# Patient Record
Sex: MALE | Employment: OTHER | URBAN - METROPOLITAN AREA
[De-identification: names, ages, dates, MRNs, and addresses within clinical notes are randomized per-mention and may not be internally consistent; named-entity substitution may affect disease eponyms.]

---

## 2017-06-20 ENCOUNTER — TELEPHONE (OUTPATIENT)
Dept: NEUROLOGY | Facility: CLINIC | Age: 62
End: 2017-06-20

## 2017-06-20 NOTE — TELEPHONE ENCOUNTER
DMV form received, via fax on 6/20/17. Form placed in folder to be completed by GUERITA Clayton CMA

## 2017-06-22 NOTE — TELEPHONE ENCOUNTER
DMV form signed, faxed to [employer] on 6/22/17, sent to scanning, and copy mailed to patient. Original mailed to DMV.

## 2017-10-05 ENCOUNTER — OFFICE VISIT (OUTPATIENT)
Dept: INTERNAL MEDICINE | Facility: CLINIC | Age: 62
End: 2017-10-05
Payer: COMMERCIAL

## 2017-10-05 VITALS
DIASTOLIC BLOOD PRESSURE: 78 MMHG | OXYGEN SATURATION: 98 % | HEART RATE: 92 BPM | TEMPERATURE: 97.9 F | HEIGHT: 71 IN | WEIGHT: 195 LBS | BODY MASS INDEX: 27.3 KG/M2 | SYSTOLIC BLOOD PRESSURE: 110 MMHG

## 2017-10-05 DIAGNOSIS — Z23 NEED FOR PROPHYLACTIC VACCINATION AND INOCULATION AGAINST INFLUENZA: ICD-10-CM

## 2017-10-05 DIAGNOSIS — M79.645 PAIN OF FINGER OF LEFT HAND: Primary | ICD-10-CM

## 2017-10-05 PROCEDURE — 99203 OFFICE O/P NEW LOW 30 MIN: CPT | Mod: 25 | Performed by: INTERNAL MEDICINE

## 2017-10-05 PROCEDURE — 90471 IMMUNIZATION ADMIN: CPT | Performed by: INTERNAL MEDICINE

## 2017-10-05 PROCEDURE — 90686 IIV4 VACC NO PRSV 0.5 ML IM: CPT | Performed by: INTERNAL MEDICINE

## 2017-10-05 NOTE — PATIENT INSTRUCTIONS
Ice to the finger area  5-10 min twice a day for 1 week  If not resolved, then make appt with [facility] in [place] [phone]   Dr Downing, Dr Reid,  Dr Valdez or Dr Pope (hand specialists)  Hold Aspirin and fish oil between now and possible future ortho appt. If getting better, then may restart  See me as scheduled for full physical and discussion healthcare maintenance issues. Come to the appt fasting so necessary labs can be drawn

## 2017-10-05 NOTE — NURSING NOTE
"Chief Complaint   Patient presents with     Swelling     in left middle finger, x 5 weeks. Is now starting in right thumb. Sore to the touch       Initial /78  Pulse 92  Temp 97.9  F (36.6  C) (Oral)  Ht 5' 11\" (1.803 m)  Wt 195 lb (88.5 kg)  SpO2 98%  BMI 27.2 kg/m2 Estimated body mass index is 27.2 kg/(m^2) as calculated from the following:    Height as of this encounter: 5' 11\" (1.803 m).    Weight as of this encounter: 195 lb (88.5 kg).  Medication Reconciliation: complete  "

## 2017-10-05 NOTE — PROGRESS NOTES
Injectable Influenza Immunization Documentation    1.  Is the person to be vaccinated sick today?   No    2. Does the person to be vaccinated have an allergy to a component   of the vaccine?   No    3. Has the person to be vaccinated ever had a serious reaction   to influenza vaccine in the past?   No    4. Has the person to be vaccinated ever had Guillain-Barré syndrome?   No    Form completed by CECI Chamorro

## 2017-10-05 NOTE — PROGRESS NOTES
"  SUBJECTIVE:   Leighton Morales is a 61 year old male who presents to clinic today for the following health issues:      Chief Complaint   Patient presents with     Swelling     in left middle finger, x 5 weeks.  Sore to the touch     Flu Shot       Pt's past medical history, family history, habits, medications and allergies were reviewed with the patient today. Most recent lab results reviewed with pt. Problem list and histories reviewed & adjusted, as indicated.  Additional history as below:    Pt establishing care with me also today. HAs been followed by [facility] clinic provider for the past 10 years. Notes in Care Everywhere reviewed. Has future PE appt set up. Neurology notes reviewed. Has not had recent seizure activity. Pt complains of swelling between the PIP and DIP joints of left 3rd finger as above. Left handed. Doesn't remember any trauma to finger. Works in a bank. Area of finger swollen some and skin started to slough superficially \"like a blister popping\". Dennies skin sx elsewhere on body     Additional ROS:   Constitutional, HEENT, Cardiovascular, Pulmonary, GI and , Neuro, MSK and Psych review of systems/symptoms are otherwise negative or unchanged from previous, except as noted above.      OBJECTIVE:  /78  Pulse 92  Temp 97.9  F (36.6  C) (Oral)  Ht 5' 11\" (1.803 m)  Wt 195 lb (88.5 kg)  SpO2 98%  BMI 27.2 kg/m2   Estimated body mass index is 27.2 kg/(m^2) as calculated from the following:    Height as of this encounter: 5' 11\" (1.803 m).    Weight as of this encounter: 195 lb (88.5 kg).    Pulm: Lungs clear to auscultation   CV: Regular rates and rhythm  GI: Soft, nontender, Normal active bowel sounds   Ext: Peripheral pulses intact. No edema.  Neuro: Normal strength and tone, sensory exam grossly normal  Skin: Mild fluctuence left 3rd finger between PIP and DIP joint on palmar side. Slight underlying discoloration like a bruise/blood blister. Mild tenderness to palpation this area. Able " to bend DIP and PIP joints that finger without tenderness and ROM minimally reduced to flexion  At the PIP joint due to the swelling    Followed verbal consent, area of concern was cleaned with rubbing alcohol and sterile 21g needle was passed into the flutuence. Small amount of blood drained without any sign of pus and fluctuence was much less and nearly absent after that    Assessment/Plan: (See plan discussion below for further details)  1. Pain of finger of left hand  Probable blood blister. See plan below    2. Need for prophylactic vaccination and inoculation against influenza  - FLU VAC, SPLIT VIRUS IM > 3 YO (QUADRIVALENT) [00755]  - Vaccine Administration, Initial [15446]    Plan discussion:  Ice to the finger area  5-10 min twice a day for 1 week  If not resolved, then make appt with [facility] in [place] [phone]   Dr Downing, Dr Reid,  Dr Valdez or Dr Pope (hand specialists)  Hold Aspirin and fish oil between now and possible future ortho appt. If getting better, then may restart  See me as scheduled for full physical and discussion healthcare maintenance issues. Come to the appt fasting so necessary labs can be drawn       Los Mcelroy MD  Internal Medicine Department  [facility]

## 2017-10-05 NOTE — MR AVS SNAPSHOT
After Visit Summary   10/5/2017    Leighton Morales    MRN: [id number]           Patient Information     Date Of Birth          1955        Visit Information        Provider Department      10/5/2017 4:00 PM Los Mcelroy MD [facility]        Today's Diagnoses     Pain of finger of left hand    -  1    Need for Tdap vaccination          Care Instructions    Ice to the finger area  5-10 min twice a day for 1 week  If not resolved, then make appt with [facility] in [place] [phone]   Dr Downing, Dr Up,  Dr Valdez or Dr Pope (hand specialists)  Hold Aspirin and fish oil between now and possible future ortho appt. If getting better, then may restart          Follow-ups after your visit        Your next 10 appointments already scheduled     Oct 23, 2017  8:00 AM CDT   [facility]    [address] [address] [phone]              Who to contact     If you have questions or need follow up information about today's clinic visit or your schedule please contact [facility] directly at [phone].  Normal or non-critical lab and imaging results will be communicated to you by MyChart, letter or phone within 4 business days after the clinic has received the results. If you do not hear from us within 7 days, please contact the clinic through MyChart or phone. If you have a critical or abnormal lab result, we will notify you by phone as soon as possible.  Submit refill requests through i.Meter or call your pharmacy and they will forward the refill request to us. Please allow 3 business days for your refill to be completed.          Additional Information About Your Visit        i.Meter Information     i.Meter lets you send messages to your doctor, view your test results, renew your prescriptions, schedule appointments and more. To  "sign up, go to [email or web address] . Click on \"Log in\" on the left side of the screen, which will take you to the Welcome page. Then click on \"Sign up Now\" on the right side of the page.     You will be asked to enter the access code listed below, as well as some personal information. Please follow the directions to create your username and password.     Your access code is: [email or web address]  Expires: 1/3/2018  4:47 PM     Your access code will  in 90 days. If you need help or a new code, please call your [place] [facility] clinic or [phone].        Care EveryWhere ID     This is your Care EveryWhere ID. This could be used by other organizations to access your [facility] medical records  [id number]        Your Vitals Were     Pulse Temperature Height Pulse Oximetry BMI (Body Mass Index)       92 97.9  F (36.6  C) (Oral) 5' 11\" (1.803 m) 98% 27.2 kg/m2        Blood Pressure from Last 3 Encounters:   10/05/17 110/78   16 113/66   09/25/15 103/76    Weight from Last 3 Encounters:   10/05/17 195 lb (88.5 kg)   16 187 lb 12.8 oz (85.2 kg)   09/25/15 183 lb 3.2 oz (83.1 kg)              Today, you had the following     No orders found for display       Primary Care Provider Office Phone # Fax #    Sarthak Fuentes [phone]       [address] [place] [id number]        Equal Access to Services     [facility] : Hadii ramon george hadasho Sodrew, waaxda luqadaha, qaybta kaalmada kristan, nika almeida. So [facility] [phone].    ATENCIÓN: Si habla español, tiene a rojas disposición servicios gratuitos de asistencia lingüística. Elli al [phone].    We comply with applicable federal civil rights laws and [place] laws. We do not discriminate on the basis of race, color, national origin, age, disability, sex, sexual orientation, or gender identity.            Thank you!     Thank you for choosing [facility]  for your care. Our goal is " always to provide you with excellent care. Hearing back from our patients is one way we can continue to improve our services. Please take a few minutes to complete the written survey that you may receive in the mail after your visit with us. Thank you!             Your Updated Medication List - Protect others around you: Learn how to safely use, store and throw away your medicines at www.disposemymeds.org.          This list is accurate as of: 10/5/17  4:47 PM.  Always use your most recent med list.                   Brand Name Dispense Instructions for use Diagnosis    aspirin 81 MG tablet      Take 81 mg by mouth daily        carBAMazepine 400 MG 12 hr tablet    TEGretol XR    360 tablet    TAKE 1 TABLETS EVERY MORNING, 2 TABS EVERY AFTERNOON, AND 1 TABS EVERY EVENING    Partial epilepsy with intractable epilepsy (H), Seizure syndrome (H)       levETIRAcetam 1000 MG Tabs     405 tablet    TAKE 1.5 TABLETS BY MOUTH THREE TIMES DAILY    Seizure disorder (H)       MULTIVITAMINS PO      Take by mouth daily        OMEGA-3 FISH OIL PO      Take by mouth daily        pravastatin 10 MG tablet    PRAVACHOL     Take 20 mg by mouth daily At bedtime

## 2017-10-09 ENCOUNTER — TELEPHONE (OUTPATIENT)
Dept: NEUROLOGY | Facility: CLINIC | Age: 62
End: 2017-10-09

## 2017-10-09 DIAGNOSIS — G40.909 SEIZURE SYNDROME (H): ICD-10-CM

## 2017-10-09 DIAGNOSIS — G40.119 PARTIAL EPILEPSY WITH INTRACTABLE EPILEPSY (H): Primary | ICD-10-CM

## 2017-10-09 RX ORDER — CARBAMAZEPINE 400 MG/1
TABLET, EXTENDED RELEASE ORAL
Qty: 360 TABLET | Refills: 0 | Status: SHIPPED | OUTPATIENT
Start: 2017-10-09 | End: 2017-11-16

## 2017-10-09 NOTE — TELEPHONE ENCOUNTER
One week supply of Carbamazepine XR 12 hour tabs ([phone]) verbally called into [facility], [place] (# [id number])

## 2017-10-23 ENCOUNTER — TRANSFERRED RECORDS (OUTPATIENT)
Dept: HEALTH INFORMATION MANAGEMENT | Facility: CLINIC | Age: 62
End: 2017-10-23

## 2017-10-23 ENCOUNTER — OFFICE VISIT (OUTPATIENT)
Dept: INTERNAL MEDICINE | Facility: CLINIC | Age: 62
End: 2017-10-23
Payer: COMMERCIAL

## 2017-10-23 VITALS
TEMPERATURE: 97.7 F | SYSTOLIC BLOOD PRESSURE: 116 MMHG | OXYGEN SATURATION: 99 % | WEIGHT: 186 LBS | DIASTOLIC BLOOD PRESSURE: 70 MMHG | BODY MASS INDEX: 25.94 KG/M2 | HEART RATE: 76 BPM

## 2017-10-23 DIAGNOSIS — Z00.01 ENCOUNTER FOR ROUTINE ADULT MEDICAL EXAM WITH ABNORMAL FINDINGS: Primary | ICD-10-CM

## 2017-10-23 DIAGNOSIS — F10.10 EXCESSIVE DRINKING ALCOHOL: ICD-10-CM

## 2017-10-23 DIAGNOSIS — E78.2 MIXED HYPERLIPIDEMIA: ICD-10-CM

## 2017-10-23 DIAGNOSIS — R56.9 CONVULSIONS, UNSPECIFIED CONVULSION TYPE (H): ICD-10-CM

## 2017-10-23 DIAGNOSIS — N40.1 BENIGN PROSTATIC HYPERPLASIA WITH LOWER URINARY TRACT SYMPTOMS, SYMPTOM DETAILS UNSPECIFIED: ICD-10-CM

## 2017-10-23 DIAGNOSIS — Z12.5 SCREENING FOR PROSTATE CANCER: ICD-10-CM

## 2017-10-23 LAB
ALBUMIN SERPL-MCNC: 4 G/DL (ref 3.4–5)
ALBUMIN SERPL-MCNC: 4 G/DL (ref 3.4–5)
ALP SERPL-CCNC: 50 U/L (ref 40–150)
ALP SERPL-CCNC: 50 U/L (ref 40–150)
ALT SERPL W P-5'-P-CCNC: 31 U/L (ref 0–70)
ALT SERPL-CCNC: 31 U/L (ref 0–70)
ANION GAP SERPL CALCULATED.3IONS-SCNC: 7 MMOL/L (ref 3–14)
ANION GAP SERPL CALCULATED.3IONS-SCNC: 7 MMOL/L (ref 3–14)
AST SERPL W P-5'-P-CCNC: 15 U/L (ref 0–45)
AST SERPL-CCNC: 15 U/L (ref 0–45)
BILIRUB SERPL-MCNC: 0.5 MG/DL (ref 0.2–1.3)
BILIRUB SERPL-MCNC: 0.5 MG/DL (ref 0.2–1.3)
BUN SERPL-MCNC: 13 MG/DL (ref 7–30)
BUN SERPL-MCNC: 13 MG/DL (ref 7–30)
CALCIUM SERPL-MCNC: 8.8 MG/DL (ref 8.5–10.1)
CALCIUM SERPL-MCNC: 8.8 MG/DL (ref 8.5–10.1)
CARBAMAZEPINE SERPL-MCNC: 12.7 MG/L (ref 4–12)
CARBAMAZEPINE SERPL-MCNC: 12.7 UG/ML (ref 4–12)
CHLORIDE SERPL-SCNC: 104 MMOL/L (ref 94–109)
CHLORIDE SERPLBLD-SCNC: 104 MMOL/L (ref 94–109)
CHOLEST SERPL-MCNC: 224 MG/DL
CHOLEST SERPL-MCNC: 224 MG/DL
CO2 SERPL-SCNC: 29 MMOL/L (ref 20–32)
CO2 SERPL-SCNC: 29 MMOL/L (ref 20–32)
CREAT SERPL-MCNC: 0.98 MG/DL (ref 0.66–1.25)
CREAT SERPL-MCNC: 0.98 MG/DL (ref 0.66–1.25)
ERYTHROCYTE [DISTWIDTH] IN BLOOD BY AUTOMATED COUNT: 12.3 % (ref 10–15)
ERYTHROCYTE [DISTWIDTH] IN BLOOD BY AUTOMATED COUNT: 12.3 % (ref 10–15)
GFR SERPL CREATININE-BSD FRML MDRD: 78 ML/MIN/1.7M2
GLUCOSE SERPL-MCNC: 93 MG/DL (ref 70–99)
GLUCOSE SERPL-MCNC: 93 MG/DL (ref 70–99)
HCT VFR BLD AUTO: 43 % (ref 40–53)
HCT VFR BLD AUTO: 43 % (ref 40–53)
HDLC SERPL-MCNC: 79 MG/DL
HDLC SERPL-MCNC: 79 MG/DL
HEMOGLOBIN: 14.6 G/DL (ref 13.3–17.7)
HGB BLD-MCNC: 14.6 G/DL (ref 13.3–17.7)
KEPPRA (LEVETIRACETAM) LEVEL: 39 UG/ML (ref 12–46)
LDL CHOLESTEROL: 131 MG/DL
LDLC SERPL CALC-MCNC: 131 MG/DL
MCH RBC QN AUTO: 31.9 PG (ref 26.5–33)
MCH RBC QN AUTO: 31.9 PG (ref 26.5–33)
MCHC RBC AUTO-ENTMCNC: 34 G/DL (ref 31.5–36.5)
MCHC RBC AUTO-ENTMCNC: 34 G/DL (ref 31.5–36.5)
MCV RBC AUTO: 94 FL (ref 78–100)
MCV RBC AUTO: 94 FL (ref 78–100)
NONHDLC SERPL-MCNC: 145 MG/DL
NONHDLC SERPL-MCNC: 145 MG/DL
PLATELET # BLD AUTO: 152 10E9/L (ref 150–450)
PLATELET # BLD AUTO: 152 10E9/L (ref 150–450)
POTASSIUM SERPL-SCNC: 3.9 MMOL/L (ref 3.4–5.3)
POTASSIUM SERPL-SCNC: 3.9 MMOL/L (ref 3.4–5.3)
PROT SERPL-MCNC: 7.5 G/DL (ref 6.8–8.8)
PROT SERPL-MCNC: 7.5 G/DL (ref 6.8–8.8)
PSA SERPL-ACNC: 2.71 UG/L (ref 0–4)
PSA SERPL-MCNC: 2.71 UG/L (ref 0–4)
RBC # BLD AUTO: 4.58 10E12/L (ref 4.4–5.9)
RBC # BLD AUTO: 4.58 10E12/L (ref 4.4–5.9)
SODIUM SERPL-SCNC: 140 MMOL/L (ref 133–144)
SODIUM SERPL-SCNC: 140 MMOL/L (ref 133–144)
TRIGL SERPL-MCNC: 68 MG/DL
TRIGL SERPL-MCNC: 68 MG/DL
WBC # BLD AUTO: 4.4 10E9/L (ref 4–11)
WBC # BLD AUTO: 4.4 10E9/L (ref 4–11)

## 2017-10-23 PROCEDURE — 80156 ASSAY CARBAMAZEPINE TOTAL: CPT | Performed by: INTERNAL MEDICINE

## 2017-10-23 PROCEDURE — 99396 PREV VISIT EST AGE 40-64: CPT | Performed by: INTERNAL MEDICINE

## 2017-10-23 PROCEDURE — 80177 DRUG SCRN QUAN LEVETIRACETAM: CPT | Mod: 90 | Performed by: INTERNAL MEDICINE

## 2017-10-23 PROCEDURE — G0103 PSA SCREENING: HCPCS | Performed by: INTERNAL MEDICINE

## 2017-10-23 PROCEDURE — 80053 COMPREHEN METABOLIC PANEL: CPT | Performed by: INTERNAL MEDICINE

## 2017-10-23 PROCEDURE — 99000 SPECIMEN HANDLING OFFICE-LAB: CPT | Performed by: INTERNAL MEDICINE

## 2017-10-23 PROCEDURE — 80061 LIPID PANEL: CPT | Performed by: INTERNAL MEDICINE

## 2017-10-23 PROCEDURE — 36415 COLL VENOUS BLD VENIPUNCTURE: CPT | Performed by: INTERNAL MEDICINE

## 2017-10-23 PROCEDURE — 85027 COMPLETE CBC AUTOMATED: CPT | Performed by: INTERNAL MEDICINE

## 2017-10-23 RX ORDER — TAMSULOSIN HYDROCHLORIDE 0.4 MG/1
0.4 CAPSULE ORAL DAILY
Qty: 90 CAPSULE | Refills: 3 | Status: SHIPPED | OUTPATIENT
Start: 2017-10-23 | End: 2018-12-20

## 2017-10-23 RX ORDER — TAMSULOSIN HYDROCHLORIDE 0.4 MG/1
0.4 CAPSULE ORAL DAILY
COMMUNITY
End: 2017-10-23

## 2017-10-23 RX ORDER — PRAVASTATIN SODIUM 20 MG
20 TABLET ORAL DAILY
Qty: 90 TABLET | Refills: 3 | COMMUNITY
Start: 2017-10-23 | End: 2018-02-07 | Stop reason: DRUGHIGH

## 2017-10-23 NOTE — NURSING NOTE
"Chief Complaint   Patient presents with     Physical       Initial /70  Pulse 76  Temp 97.7  F (36.5  C) (Oral)  Wt 186 lb (84.4 kg)  SpO2 99%  BMI 25.94 kg/m2 Estimated body mass index is 25.94 kg/(m^2) as calculated from the following:    Height as of 10/5/17: 5' 11\" (1.803 m).    Weight as of this encounter: 186 lb (84.4 kg).  Medication Reconciliation: complete  "

## 2017-10-23 NOTE — PROGRESS NOTES
SUBJECTIVE:   CC: Leighton Morales is an 61 year old male who presents for preventative health visit.     Healthy Habits:  Answers for HPI/ROS submitted by the patient on 10/23/2017   Annual Exam:  Getting at least 3 servings of Calcium per day:: Yes  Bi-annual eye exam:: Yes  Dental care twice a year:: Yes  Sleep apnea or symptoms of sleep apnea:: Sleep apnea  Frequency of exercise:: 2-3 days/week  Taking medications regularly:: Yes  Medication side effects:: None  PHQ-2 Score: 0  Duration of exercise:: 15-30 minutes    Colonoscopy 9/15/08 at outside facility        Today's PHQ-2 Score: PHQ-2 ( 1999 Pf[employer]) 10/23/2017 10/5/2017   Q1: Little interest or pleasure in doing things 0 0   Q2: Feeling down, depressed or hopeless 0 0   PHQ-2 Score 0 0   Q1: Little interest or pleasure in doing things Not at all -   Q2: Feeling down, depressed or hopeless Not at all -   PHQ-2 Score 0 -         Abuse: Current or Past(Physical, Sexual or Emotional)- No  Do you feel safe in your environment - Yes  Social History   Substance Use Topics     Smoking status: Former Smoker     Packs/day: 0.50     Quit date: 8/30/1978     Smokeless tobacco: Never Used     Alcohol use Yes      Comment: occ         Drinking 6 oz whiskey/day at night. Denies depression/anxiety and says is just a habit from what has done for years. No legal difficulties. No driving when drinking. HAs stopped for 1 week before without any withdrawal or sz.   No eye opener. Never missed work due to alcohol. Denies hangovers.     Last PSA: No results found for: PSA    Reviewed orders with patient. Reviewed health maintenance and updated orders accordingly - Yes  Labs reviewed in EPIC    Reviewed and updated as needed this visit by clinical staff  Allergies         Reviewed and updated as needed this visit by Provider        ROS:  C: NEGATIVE for fever, chills. Weight back to baseline from  1 yearago  I: NEGATIVE for worrisome rashes, moles. Left 3rd finger swelling skin  less than previous visit  E: NEGATIVE for vision changes or irritation. Has glasses. Eye exam 2 years ago  ENT: NEGATIVE for ear, mouth and throat problems  R: NEGATIVE for significant cough or SOB  CV: NEGATIVE for chest pain, palpitations or peripheral edema  GI: NEGATIVE for nausea, abdominal pain, heartburn, or change in bowel habits   male: negative for dysuria, hematuria, decreased urinary stream,  urethral discharge. Rare ED sx  M: NEGATIVE for significant arthralgias or myalgia that limit activity. Occ stiffness in low back with sitting all day at work. Not doing  Exercises previously given   N: NEGATIVE for weakness, dizziness or paresthesias. On sz meds. Last levels about 1 year ago and requesting to have done. Last sz approx 2007    P: NEGATIVE for changes in mood or affect. See above re: alcohol use    OBJECTIVE:   /70  Pulse 76  Temp 97.7  F (36.5  C) (Oral)  Wt 186 lb (84.4 kg)  SpO2 99%  BMI 25.94 kg/m2  EXAM:  General appearance - healthy, alert, no distress  Skin - No rashes. Minimal fluctuence  palmar side  Left 3rd finger between DIP and PIP joint (less than previous visit) without tenderness  Head - normocephalic, atraumatic  Eyes - LIZ, EOMI, fundi exam with nondilated pupils negative.  Ears - External ears normal. Canals clear. TM's normal.  Nose/Sinuses - Nares normal. Septum midline. Mucosa normal. No drainage or sinus tenderness.  Oropharynx - No erythema, no adenopathy, no exudates.  Neck - Supple without adenopathy or thyromegaly. No bruits.  Lungs - Clear to auscultation without wheezes/rhonchi.  Heart - Regular rate and rhythm without murmurs, clicks, or gallops.  Nodes - No supraclavicular, axillary, or inguinal adenopathy palpable.  Abdomen - Abdomen soft, non-tender. BS normal. No masses or hepatosplenomegaly palpable. No bruits.  Extremities -No cyanosis, clubbing or edema.    Musculoskeletal - Spine ROM normal. Muscular strength intact.   Peripheral pulses -  radial=4/4, femoral=4/4, posterior tibial=4/4, dorsalis pedis=4/4,  Neuro - Gait normal. Reflexes normal and symmetric. Sensation grossly WNL.  Genital - Normal-appearing male external genitalia. No scrotal masses or inguinal hernia palpable.   Rectal - Guaic negative stool. Normal tone. Prostate 2 plus in size to palpation. No rectal masses or prostate nodularity palpable      ASSESSMENT/PLAN:   1. Encounter for routine adult medical exam with abnormal findings  Screening labs as ordered. Other HCM UTD  - CBC with platelets  - Comprehensive metabolic panel    2. Benign prostatic hyperplasia with lower urinary tract symptoms, symptom details unspecified  Sx controlled with meds  - tamsulosin (FLOMAX) 0.4 MG capsule; Take 1 capsule (0.4 mg) by mouth daily  Dispense: 90 capsule; Refill: 3    3. Mixed hyperlipidemia  On low dose statin currently. Labs as ordered to see if increased dosage needed  - pravastatin (PRAVACHOL) 20 MG tablet; Take 1 tablet (20 mg) by mouth daily  Dispense: 90 tablet; Refill: 3  - Comprehensive metabolic panel  - Lipid panel reflex to direct LDL    4. Convulsions, unspecified convulsion type (H)  Followed by neurology. NO sz for many years. Labs as ordered. Continue current meds pending results  - Carbamazepine total  - Keppra (Levetiracetam) Level  - CBC with platelets  - Comprehensive metabolic panel    5. Excessive drinking alcohol  Denies legal issues in past and never drinking/driving. See plan discussion as below. Labs as ordered  - Comprehensive metabolic pane    6. Screening for prostate cancer   PSA ordered for intermittent monitoring after discussion with pt and pt preference. Not doing annual PSA per USPTF recs  - Prostate spec antigen screen      COUNSELING:  Reviewed preventive health counseling, as reflected in patient instructions  Special attention given to:        Regular exercise       Healthy diet/nutrition       Alcohol Use             reports that he quit smoking about 39  "years ago. He smoked 0.50 packs per day. He has never used smokeless tobacco.      Estimated body mass index is 27.2 kg/(m^2) as calculated from the following:    Height as of 10/5/17: 5' 11\" (1.803 m).    Weight as of 10/5/17: 195 lb (88.5 kg).   Weight management plan: Discussed healthy diet and exercise guidelines and patient will follow up in 12 months in clinic to re-evaluate.    Counseling Resources:  ATP IV Guidelines  Pooled Cohorts Equation Calculator  FRAX Risk Assessment  ICSI Preventive Guidelines  Dietary Guidelines for Americans, 2010  USDA's MyPlate  ASA Prophylaxis  Lung CA Screening      PLAN:   Labs as ordered   Pt to try stopping all alcohol intake for the next 2 weeks. If not able to do, then contact clinic and will refer for chem dep counselling. If able to stop for that period of time, then may remain off alcohol if choose or use intermittently with max 1 drink/night  Continue current meds  Continue CPAP    Los Mcelroy MD  [facility]  "

## 2017-10-23 NOTE — MR AVS SNAPSHOT
After Visit Summary   10/23/2017    Leighton Morales    MRN: [id number]           Patient Information     Date Of Birth          1955        Visit Information        Provider Department      10/23/2017 8:00 AM Los Mcelroy MD [facility]        Today's Diagnoses     Encounter for routine adult medical exam with abnormal findings    -  1    Benign prostatic hyperplasia with lower urinary tract symptoms, symptom details unspecified        Mixed hyperlipidemia        Convulsions, unspecified convulsion type (H)        Excessive drinking alcohol        Screening for prostate cancer          Care Instructions      Preventive Health Recommendations  Male Ages 50 - 64    Yearly exam:             See your health care provider every year in order to  o   Review health changes.   o   Discuss preventive care.    o   Review your medicines if your doctor has prescribed any.     Have a cholesterol test every 5 years, or more frequently if you are at risk for high cholesterol/heart disease.     Have a diabetes test (fasting glucose) every three years. If you are at risk for diabetes, you should have this test more often.     Have a colonoscopy at age 50, or have a yearly FIT test (stool test). These exams will check for colon cancer.      Talk with your health care provider about whether or not a prostate cancer screening test (PSA) is right for you.    You should be tested each year for STDs (sexually transmitted diseases), if you re at risk.     Shots: Get a flu shot each year. Get a tetanus shot every 10 years.     Nutrition:    Eat at least 5 servings of fruits and vegetables daily.     Eat whole-grain bread, whole-wheat pasta and brown rice instead of white grains and rice.     Talk to your provider about Calcium and Vitamin D.     Lifestyle    Exercise for at least 150 minutes a week (30 minutes a day, 5 days a week). This will help you control your weight and prevent disease.  "    Limit alcohol to one drink per day.     No smoking.     Wear sunscreen to prevent skin cancer.     See your dentist every six months for an exam and cleaning.     See your eye doctor every 1 to 2 years.            Follow-ups after your visit        Your next 10 appointments already scheduled     2017  3:00 PM CDT   Return Visit with Carmen Patton MD   [facility] ([facility])    [address], [address]  [facility] [address] [phone]              Who to contact     If you have questions or need follow up information about today's clinic visit or your schedule please contact [facility] directly at [phone].  Normal or non-critical lab and imaging results will be communicated to you by [employer], letter or phone within 4 business days after the clinic has received the results. If you do not hear from us within 7 days, please contact the clinic through [employer] or phone. If you have a critical or abnormal lab result, we will notify you by phone as soon as possible.  Submit refill requests through [employer] or call your pharmacy and they will forward the refill request to us. Please allow 3 business days for your refill to be completed.          Additional Information About Your Visit        GreatCall Information     GreatCall lets you send messages to your doctor, view your test results, renew your prescriptions, schedule appointments and more. To sign up, go to [email or web address] . Click on \"Log in\" on the left side of the screen, which will take you to the Welcome page. Then click on \"Sign up Now\" on the right side of the page.     You will be asked to enter the access code listed below, as well as some personal information. Please follow the directions to create your username and password.     Your access code is: [email or web address]  Expires: 1/3/2018  4:47 PM     Your access code will  in 90 days. If you need help or a new code, please " call your [facility] clinic or [phone].        Care EveryWhere ID     This is your Care EveryWhere ID. This could be used by other organizations to access your [facility] medical records  [id number]        Your Vitals Were     Pulse Temperature Pulse Oximetry BMI (Body Mass Index)          76 97.7  F (36.5  C) (Oral) 99% 25.94 kg/m2         Blood Pressure from Last 3 Encounters:   10/23/17 116/70   10/05/17 110/78   09/20/16 113/66    Weight from Last 3 Encounters:   10/23/17 186 lb (84.4 kg)   10/05/17 195 lb (88.5 kg)   09/20/16 187 lb 12.8 oz (85.2 kg)              We Performed the Following     Carbamazepine total     CBC with platelets     Comprehensive metabolic panel     Keppra (Levetiracetam) Level     Lipid panel reflex to direct LDL     Prostate spec antigen screen          Today's Medication Changes          These changes are accurate as of: 10/23/17  9:42 PM.  If you have any questions, ask your nurse or doctor.               These medicines have changed or have updated prescriptions.        Dose/Directions    PRAVACHOL 20 MG tablet   This may have changed:  Another medication with the same name was removed. Continue taking this medication, and follow the directions you see here.   Used for:  Mixed hyperlipidemia   Generic drug:  pravastatin   Changed by:  Los Mcelroy MD        Dose:  20 mg   Take 1 tablet (20 mg) by mouth daily   Quantity:  90 tablet   Refills:  3            Where to get your medicines      These medications were sent to [facility] [address] [address]     Phone:  [phone]     tamsulosin 0.4 MG capsule                Primary Care Provider Office Phone # Fax #    Sarthak Fuentes [phone]       [address] [place] [phone]        Equal Access to Services     JORGE YU AH: Marium Méndez, beverly longoria, qarachele rushing, nika morris  terrie whytestewartscott mccartneyStacyaakimberlyn ah. So [facility] [phone].    ATENCIÓN: Si antonio hull, tiene a rojas disposición servicios gratuitos de asistencia lingüística. Elli dawkins [phone].    We comply with applicable federal civil rights laws and [place] laws. We do not discriminate on the basis of race, color, national origin, age, disability, sex, sexual orientation, or gender identity.            Thank you!     Thank you for choosing [facility]  for your care. Our goal is always to provide you with excellent care. Hearing back from our patients is one way we can continue to improve our services. Please take a few minutes to complete the written survey that you may receive in the mail after your visit with us. Thank you!             Your Updated Medication List - Protect others around you: Learn how to safely use, store and throw away your medicines at www.disposemymeds.org.          This list is accurate as of: 10/23/17  9:42 PM.  Always use your most recent med list.                   Brand Name Dispense Instructions for use Diagnosis    aspirin 81 MG tablet      Take 81 mg by mouth daily        carBAMazepine 400 MG 12 hr tablet    TEGretol XR    360 tablet    TAKE 1 TABLETS EVERY MORNING, 2 TABS EVERY AFTERNOON, AND 1 TABS EVERY EVENING    Partial epilepsy with intractable epilepsy (H), Seizure syndrome (H)       levETIRAcetam 1000 MG Tabs     405 tablet    TAKE 1.5 TABLETS BY MOUTH THREE TIMES DAILY    Seizure disorder (H)       MULTIVITAMINS PO      Take by mouth daily        OMEGA-3 FISH OIL PO      Take by mouth daily        PRAVACHOL 20 MG tablet   Generic drug:  pravastatin     90 tablet    Take 1 tablet (20 mg) by mouth daily    Mixed hyperlipidemia       tamsulosin 0.4 MG capsule    FLOMAX    90 capsule    Take 1 capsule (0.4 mg) by mouth daily    Benign prostatic hyperplasia with lower urinary tract symptoms, symptom details unspecified

## 2017-10-24 LAB — LEVETIRACETAM SERPL-MCNC: 39 UG/ML (ref 12–46)

## 2017-11-14 ENCOUNTER — TELEPHONE (OUTPATIENT)
Dept: INTERNAL MEDICINE | Facility: CLINIC | Age: 62
End: 2017-11-14

## 2017-11-14 NOTE — TELEPHONE ENCOUNTER
Reason for call:  Results   Name of test or procedure: Lab results  Date of test or procedure: Saw dr hayward on 10/23/2017 for physical, did labs never received results  Location of test or procedure: [facility]    Additional comments: Please call patient with the lab results      Phone number to reach patient:  Home number on file [phone] (home)    Best Time:  anytime    Can we leave a detailed message on this number?  YES

## 2017-11-15 NOTE — TELEPHONE ENCOUNTER
Reason for Call:  Request for results:    Name of test or procedure: LABS    Date of test of procedure: 10/23/17    Location of the test or procedure: LAB    OK to leave the result message on voice mail or with a family member? YES    Phone number Patient can be reached at:  Home number on file [phone] (home)    Additional comments: please fax results to [facility] (clinic) FAX: [phone] attention to     Call taken on 11/15/2017 at 10:55 AM by Jono Hernandez

## 2017-11-16 ENCOUNTER — OFFICE VISIT (OUTPATIENT)
Dept: NEUROLOGY | Facility: CLINIC | Age: 62
End: 2017-11-16

## 2017-11-16 VITALS
WEIGHT: 187.8 LBS | DIASTOLIC BLOOD PRESSURE: 76 MMHG | BODY MASS INDEX: 26.29 KG/M2 | HEART RATE: 74 BPM | HEIGHT: 71 IN | SYSTOLIC BLOOD PRESSURE: 118 MMHG

## 2017-11-16 DIAGNOSIS — G40.119 PARTIAL EPILEPSY WITH INTRACTABLE EPILEPSY (H): ICD-10-CM

## 2017-11-16 DIAGNOSIS — G40.909 SEIZURE SYNDROME (H): ICD-10-CM

## 2017-11-16 DIAGNOSIS — R56.9 CONVULSIONS, UNSPECIFIED CONVULSION TYPE (H): Primary | ICD-10-CM

## 2017-11-16 DIAGNOSIS — G40.909 SEIZURE DISORDER (H): ICD-10-CM

## 2017-11-16 RX ORDER — LEVETIRACETAM 1000 MG/1
TABLET ORAL
Qty: 270 TABLET | Refills: 3 | Status: SHIPPED | OUTPATIENT
Start: 2017-11-16 | End: 2018-10-17

## 2017-11-16 RX ORDER — CARBAMAZEPINE 400 MG/1
TABLET, EXTENDED RELEASE ORAL
Qty: 360 TABLET | Refills: 3 | Status: SHIPPED | OUTPATIENT
Start: 2017-11-16 | End: 2018-10-17
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
## 2017-11-16 NOTE — MR AVS SNAPSHOT
After Visit Summary   2017    Leighton Morales    MRN: [id number]           Patient Information     Date Of Birth          1955        Visit Information        Provider Department      2017 1:00 PM Carmen Patton MD [facility]        Today's Diagnoses     Convulsions, unspecified convulsion type (H)    -  1    Seizure disorder (H)        Partial epilepsy with intractable epilepsy (H)        Seizure syndrome (H)           Follow-ups after your visit        Follow-up notes from your care team     Return in about 1 year (around 2018).      Who to contact     Please call your clinic at [phone] to:    Ask questions about your health    Make or cancel appointments    Discuss your medicines    Learn about your test results    Speak to your doctor   If you have compliments or concerns about an experience at your clinic, or if you wish to file a complaint, please contact [facility] Patient Relations at [phone] or email us at [email or web address]         Additional Information About Your Visit        MyChart Information     [employer] is an electronic gateway that provides easy, online access to your medical records. With [employer], you can request a clinic appointment, read your test results, renew a prescription or communicate with your care team.     To sign up for [employer] visit the website at [email or web address]   You will be asked to enter the access code listed below, as well as some personal information. Please follow the directions to create your username and password.     Your access code is: [email or web address]  Expires: 1/3/2018  3:47 PM     Your access code will  in 90 days. If you need help or a new code, please contact your [facility] or call [phone] for assistance.        Care EveryWhere ID     This is your Care EveryWhere ID. This could be used by other organizations to access your  "[place] medical records  [id number]        Your Vitals Were     Pulse Height BMI (Body Mass Index)             74 5' 11\" (180.3 cm) 26.19 kg/m2          Blood Pressure from Last 3 Encounters:   11/16/17 118/76   10/23/17 116/70   10/05/17 110/78    Weight from Last 3 Encounters:   11/16/17 187 lb 12.8 oz (85.2 kg)   10/23/17 186 lb (84.4 kg)   10/05/17 195 lb (88.5 kg)              We Performed the Following     EEG video monitoring          Today's Medication Changes          These changes are accurate as of: 11/16/17  1:43 PM.  If you have any questions, ask your nurse or doctor.               These medicines have changed or have updated prescriptions.        Dose/Directions    levETIRAcetam 1000 MG Tabs   This may have changed:  See the new instructions.   Used for:  Seizure disorder (H)   Changed by:  Carmen Patton MD        TAKE 1.5 TABLETS BY MOUTH TWICE A DAY   Quantity:  270 tablet   Refills:  3            Where to get your medicines      These medications were sent to [email or web address] [address] [address]     Phone:  [phone]     carBAMazepine 400 MG 12 hr tablet    levETIRAcetam 1000 MG Tabs                Primary Care Provider Office Phone # Fax #    Sarthak Fuentes [phone]       [address] [place] [phone]        Equal Access to Services     [facility] AH: Hadii ramon cruzo Sobertinali, waaxda luqadaha, qaybta kaalmada adeegyada, waxkristin sary morris aderianna almeida. So [facility] [phone].    ATENCIÓN: Si habla español, tiene a rojas disposición servicios gratuitos de asistencia lingüística. Elli al [phone].    We comply with applicable federal civil rights laws and [place] laws. We do not discriminate on the basis of race, color, national origin, age, disability, sex, sexual orientation, or gender identity.            Thank you!     Thank you for choosing [facility] EPILEPSY CARE  " for your care. Our goal is always to provide you with excellent care. Hearing back from our patients is one way we can continue to improve our services. Please take a few minutes to complete the written survey that you may receive in the mail after your visit with us. Thank you!             Your Updated Medication List - Protect others around you: Learn how to safely use, store and throw away your medicines at www.disposemymeds.org.          This list is accurate as of: 11/16/17  1:43 PM.  Always use your most recent med list.                   Brand Name Dispense Instructions for use Diagnosis    aspirin 81 MG tablet      Take 81 mg by mouth daily        carBAMazepine 400 MG 12 hr tablet    TEGretol XR    360 tablet    TAKE 1 TABLETS EVERY MORNING, 2 TABS EVERY AFTERNOON, AND 1 TABS EVERY EVENING    Partial epilepsy with intractable epilepsy (H), Seizure syndrome (H)       levETIRAcetam 1000 MG Tabs     270 tablet    TAKE 1.5 TABLETS BY MOUTH TWICE A DAY    Seizure disorder (H)       MULTIVITAMINS PO      Take by mouth daily        OMEGA-3 FISH OIL PO      Take by mouth daily        PRAVACHOL 20 MG tablet   Generic drug:  pravastatin     90 tablet    Take 1 tablet (20 mg) by mouth daily    Mixed hyperlipidemia       tamsulosin 0.4 MG capsule    FLOMAX    90 capsule    Take 1 capsule (0.4 mg) by mouth daily    Benign prostatic hyperplasia with lower urinary tract symptoms, symptom details unspecified
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
## 2017-12-28 DIAGNOSIS — N52.9 ERECTILE DYSFUNCTION, UNSPECIFIED ERECTILE DYSFUNCTION TYPE: Primary | ICD-10-CM

## 2017-12-28 NOTE — TELEPHONE ENCOUNTER
Do not see this medication has ever been prescribed for patient.  Left message for patient to call back.

## 2017-12-28 NOTE — TELEPHONE ENCOUNTER
Please note I have not seen this patient since 2005 and the patient was recently seen by Dr. Mcelroy for a complete physical I would suggest that the prescription be routed to him for refill.

## 2017-12-28 NOTE — TELEPHONE ENCOUNTER
cialis      Last Written Prescription Date:  na  Last Fill Quantity: na,   # refills: na  Last Office Visit: 10/23/17  Future Office visit:   0    Routing refill request to provider for review/approval because:  Drug not active on patient's medication list

## 2017-12-28 NOTE — TELEPHONE ENCOUNTER
Pt was prescribed this by previous clinic.   Takes 20 mg prn.  Hoping you will refill it now as he has reached his deductible for the year.  ( it is not covered by insurance)

## 2017-12-29 PROBLEM — N52.9 ERECTILE DYSFUNCTION, UNSPECIFIED ERECTILE DYSFUNCTION TYPE: Status: ACTIVE | Noted: 2017-12-29

## 2017-12-29 RX ORDER — TADALAFIL 20 MG/1
20 TABLET ORAL DAILY PRN
Qty: 18 TABLET | Refills: 1 | Status: SHIPPED | OUTPATIENT
Start: 2017-12-29 | End: 2018-11-21

## 2017-12-29 NOTE — TELEPHONE ENCOUNTER
Pt called.  He needs a script for cialis filled before the end of the year, because he has a $1500 deductible starting in January.

## 2017-12-29 NOTE — TELEPHONE ENCOUNTER
Care Everywhere reviewed.  Patient had prescription for Cialis given through previous [facility] with prescription given 1/2/17.  We will therefore refill medication.  [facility] pharmacy was highlighted by nursing as a preferred pharmacy.  Prescription faxed.  Please inform patient

## 2018-02-02 DIAGNOSIS — E78.2 MIXED HYPERLIPIDEMIA: ICD-10-CM

## 2018-02-02 NOTE — TELEPHONE ENCOUNTER
pravastatin (PRAVACHOL) 20 MG tablet      Last Written Prescription Date:  0  Last Fill Quantity: 0,   # refills: 0  Last Office Visit: 10/23/2017  Future Office visit:       Routing refill request to provider for review/approval because:  Medication is reported/historical

## 2018-02-07 RX ORDER — PRAVASTATIN SODIUM 40 MG
40 TABLET ORAL DAILY
Qty: 90 TABLET | Refills: 0 | Status: SHIPPED | OUTPATIENT
Start: 2018-02-07 | End: 2018-06-10

## 2018-02-07 RX ORDER — PRAVASTATIN SODIUM 20 MG
20 TABLET ORAL DAILY
Qty: 90 TABLET | Refills: 3 | OUTPATIENT
Start: 2018-02-07

## 2018-02-07 NOTE — TELEPHONE ENCOUNTER
Call pt. Most recent labs normal except lipid numbers elevated still some with Pravastatin 20mg daily dose. Would recommend pt use a higher dose of Pravastatin. Therefore Rx changed to Pravastatin 40mg tab, 1 tab daily in PM. Rx for new dose faxed to [facility]. Pt to repeat a fasting lab test in 1 month to assess new dosing. FUture lab ordered. Inform pt and assist in scheduling future lab appt

## 2018-06-10 DIAGNOSIS — E78.2 MIXED HYPERLIPIDEMIA: ICD-10-CM

## 2018-06-10 NOTE — TELEPHONE ENCOUNTER
"Requested Prescriptions   Pending Prescriptions Disp Refills     pravastatin (PRAVACHOL) 40 MG tablet [Pharmacy Med Name: PRAVASTATIN TABS 40MG]  Last Written Prescription Date:  02/07/2018  Last Fill Quantity: 90,  # refills: 0   Last office visit: 10/23/2017 with prescribing provider:  mainor    Future Office Visit:     90 tablet 0     Sig: TAKE 1 TABLET DAILY (DOSE CHANGE)    Statins Protocol Passed    6/10/2018 12:53 PM       Passed - LDL on file in past 12 months    Recent Labs   Lab Test  10/23/17   0855   LDL  131*            Passed - No abnormal creatine kinase in past 12 months    No lab results found.            Passed - Recent (12 mo) or future (30 days) visit within the authorizing provider's specialty    Patient had office visit in the last 12 months or has a visit in the next 30 days with authorizing provider or within the authorizing provider's specialty.  See \"Patient Info\" tab in inbasket, or \"Choose Columns\" in Meds & Orders section of the refill encounter.           Passed - Patient is age 18 or older          "

## 2018-06-11 RX ORDER — PRAVASTATIN SODIUM 40 MG
TABLET ORAL
Qty: 90 TABLET | Refills: 0 | Status: SHIPPED | OUTPATIENT
Start: 2018-06-11 | End: 2018-09-27

## 2018-07-26 ENCOUNTER — TELEPHONE (OUTPATIENT)
Dept: NEUROLOGY | Facility: CLINIC | Age: 63
End: 2018-07-26

## 2018-08-03 NOTE — TELEPHONE ENCOUNTER
[employer] form signed, faxed to [facility] on 8/3/18, sent to scanning, and copy mailed to patient. Original mailed to [employer].

## 2018-08-07 ENCOUNTER — ALLIED HEALTH/NURSE VISIT (OUTPATIENT)
Dept: NURSING | Facility: CLINIC | Age: 63
End: 2018-08-07
Payer: COMMERCIAL

## 2018-08-07 DIAGNOSIS — Z23 ENCOUNTER FOR IMMUNIZATION: Primary | ICD-10-CM

## 2018-08-07 PROCEDURE — 90471 IMMUNIZATION ADMIN: CPT

## 2018-08-07 PROCEDURE — 90750 HZV VACC RECOMBINANT IM: CPT

## 2018-09-27 DIAGNOSIS — E78.2 MIXED HYPERLIPIDEMIA: ICD-10-CM

## 2018-09-27 RX ORDER — PRAVASTATIN SODIUM 40 MG
TABLET ORAL
Qty: 90 TABLET | Refills: 0 | Status: SHIPPED | OUTPATIENT
Start: 2018-09-27 | End: 2019-03-06 | Stop reason: DRUGHIGH

## 2018-09-27 NOTE — TELEPHONE ENCOUNTER
"Requested Prescriptions   Pending Prescriptions Disp Refills     pravastatin (PRAVACHOL) 40 MG tablet [Pharmacy Med Name: PRAVASTATIN TABS 40MG] 90 tablet 0    Last Written Prescription Date:  6/11/2018  Last Fill Quantity: 90,  # refills: 0   Last office visit: 10/23/2017 with prescribing provider:  10/23/2017   Future Office Visit:     Sig: TAKE 1 TABLET DAILY (DOSE CHANGE)    Statins Protocol Passed    9/27/2018  7:47 AM       Passed - LDL on file in past 12 months    Recent Labs   Lab Test  10/23/17   0855   LDL  131*            Passed - No abnormal creatine kinase in past 12 months    No lab results found.            Passed - Recent (12 mo) or future (30 days) visit within the authorizing provider's specialty    Patient had office visit in the last 12 months or has a visit in the next 30 days with authorizing provider or within the authorizing provider's specialty.  See \"Patient Info\" tab in inbasket, or \"Choose Columns\" in Meds & Orders section of the refill encounter.           Passed - Patient is age 18 or older          "

## 2018-10-17 ENCOUNTER — ALLIED HEALTH/NURSE VISIT (OUTPATIENT)
Dept: NURSING | Facility: CLINIC | Age: 63
End: 2018-10-17
Payer: COMMERCIAL

## 2018-10-17 ENCOUNTER — OFFICE VISIT (OUTPATIENT)
Dept: NEUROLOGY | Facility: CLINIC | Age: 63
End: 2018-10-17
Payer: COMMERCIAL

## 2018-10-17 VITALS
DIASTOLIC BLOOD PRESSURE: 76 MMHG | TEMPERATURE: 97.2 F | HEART RATE: 78 BPM | WEIGHT: 190 LBS | SYSTOLIC BLOOD PRESSURE: 128 MMHG | BODY MASS INDEX: 26.5 KG/M2

## 2018-10-17 DIAGNOSIS — Z23 NEED FOR PROPHYLACTIC VACCINATION AND INOCULATION AGAINST INFLUENZA: Primary | ICD-10-CM

## 2018-10-17 DIAGNOSIS — G40.119 PARTIAL EPILEPSY WITH INTRACTABLE EPILEPSY (H): ICD-10-CM

## 2018-10-17 DIAGNOSIS — G40.909 SEIZURE DISORDER (H): ICD-10-CM

## 2018-10-17 DIAGNOSIS — G40.909 SEIZURE SYNDROME (H): ICD-10-CM

## 2018-10-17 DIAGNOSIS — R56.9 CONVULSIONS, UNSPECIFIED CONVULSION TYPE (H): Primary | ICD-10-CM

## 2018-10-17 PROCEDURE — 90750 HZV VACC RECOMBINANT IM: CPT

## 2018-10-17 PROCEDURE — 90682 RIV4 VACC RECOMBINANT DNA IM: CPT

## 2018-10-17 PROCEDURE — 90471 IMMUNIZATION ADMIN: CPT

## 2018-10-17 PROCEDURE — 90472 IMMUNIZATION ADMIN EACH ADD: CPT

## 2018-10-17 RX ORDER — LEVETIRACETAM 1000 MG/1
TABLET ORAL
Qty: 315 TABLET | Refills: 3 | Status: SHIPPED | OUTPATIENT
Start: 2018-10-17 | End: 2019-10-22

## 2018-10-17 RX ORDER — CARBAMAZEPINE 400 MG/1
TABLET, EXTENDED RELEASE ORAL
Qty: 360 TABLET | Refills: 3 | Status: SHIPPED | OUTPATIENT
Start: 2018-10-17 | End: 2019-10-22

## 2018-10-17 ASSESSMENT — PAIN SCALES - GENERAL: PAINLEVEL: MILD PAIN (3)

## 2018-10-17 NOTE — PROGRESS NOTES

## 2018-10-17 NOTE — MR AVS SNAPSHOT
After Visit Summary   10/17/2018    Leighton Morales    MRN: [id number]           Patient Information     Date Of Birth          1955        Visit Information        Provider Department      10/17/2018 1:00 PM Carmen Patton MD [facility]        Today's Diagnoses     Convulsions, unspecified convulsion type (H)    -  1    Partial epilepsy with intractable epilepsy (H)        Seizure syndrome (H)        Seizure disorder (H)          Care Instructions    1. Call billing about EEG ambulatory cost for 2 days  2. Schedule ambulatory EEG, if affordable   3. Increase levetiracetam 1500 mg am and 2000 mg pm   4. Continue Carbamazepine  mg in the morning, 600 mg at noon, 600 mg at night.    5. Follow up  1 year     Carmen Patton MD             Follow-ups after your visit        Follow-up notes from your care team     Return in about 1 year (around 10/17/2019) for Doctor Visit.      Your next 10 appointments already scheduled     Oct 17, 2018  3:00 PM CDT   Nurse Only with [facility] - NURSE   [facility]    [address] [address] [phone]              Future tests that were ordered for you today     Open Future Orders        Priority Expected Expires Ordered    ORDER:  EEG Ambulatory 24 hour  monitoring Routine  1/15/2019 10/17/2018            Who to contact     Please call your clinic at [phone] to:    Ask questions about your health    Make or cancel appointments    Discuss your medicines    Learn about your test results    Speak to your doctor            Additional Information About Your Visit        MyChart Information     [facility] is an electronic gateway that provides easy, online access to your medical records. With [facility], you can request a clinic appointment, read your test results, renew a prescription or communicate with your care team.     To sign up for [facility] visit the website at  [email or web address]   You will be asked to enter the access code listed below, as well as some personal information. Please follow the directions to create your username and password.     Your access code is: [email or web address]  Expires: 2018  3:48 PM     Your access code will  in 90 days. If you need help or a new code, please contact your [facility] or call [phone] for assistance.        Care EveryWhere ID     This is your Care EveryWhere ID. This could be used by other organizations to access your [facility] medical records  [id number]        Your Vitals Were     Pulse Temperature BMI (Body Mass Index)             78 97.2  F (36.2  C) 26.5 kg/m2          Blood Pressure from Last 3 Encounters:   10/17/18 128/76   17 118/76   10/23/17 116/70    Weight from Last 3 Encounters:   10/17/18 190 lb (86.2 kg)   17 187 lb 12.8 oz (85.2 kg)   10/23/17 186 lb (84.4 kg)                 Today's Medication Changes          These changes are accurate as of 10/17/18  1:22 PM.  If you have any questions, ask your nurse or doctor.               These medicines have changed or have updated prescriptions.        Dose/Directions    levETIRAcetam 1000 MG Tabs   This may have changed:  additional instructions   Used for:  Seizure disorder (H)   Changed by:  Carmen Patton MD        TAKE 1.5 TABLETS MORNING AND 2 TABLET AT NIGHT   Quantity:  315 tablet   Refills:  3            Where to get your medicines      These medications were sent to [facility] [address] [address]     Phone:  [phone]     carBAMazepine 400 MG 12 hr tablet    levETIRAcetam 1000 MG Tabs                Primary Care Provider Office Phone # Fax #    Los Mcelroy [phone]       [address] [place] [address]        Equal Access to Services     JORGE YU AH: beverly Terrazas  colinalexandria dominique rolafadi rushing, nika almeida. So [facility] [phone].    ATENCIÓN: Si antonio hull, tiene a rojas disposición servicios gratuitos de asistencia lingüística. Elli al [phone].    We comply with applicable federal civil rights laws and [place] laws. We do not discriminate on the basis of race, color, national origin, age, disability, sex, sexual orientation, or gender identity.            Thank you!     Thank you for choosing [facility]  for your care. Our goal is always to provide you with excellent care. Hearing back from our patients is one way we can continue to improve our services. Please take a few minutes to complete the written survey that you may receive in the mail after your visit with us. Thank you!             Your Updated Medication List - Protect others around you: Learn how to safely use, store and throw away your medicines at www.disposemymeds.org.          This list is accurate as of 10/17/18  1:22 PM.  Always use your most recent med list.                   Brand Name Dispense Instructions for use Diagnosis    aspirin 81 MG tablet      Take 81 mg by mouth daily        carBAMazepine 400 MG 12 hr tablet    TEGretol XR    360 tablet    TAKE 1 TABLETS EVERY MORNING, 2 TABS EVERY AFTERNOON, AND 1 TABS EVERY EVENING    Partial epilepsy with intractable epilepsy (H), Seizure syndrome (H)       levETIRAcetam 1000 MG Tabs     315 tablet    TAKE 1.5 TABLETS MORNING AND 2 TABLET AT NIGHT    Seizure disorder (H)       MULTIVITAMINS PO      Take by mouth daily        OMEGA-3 FISH OIL PO      Take by mouth daily        pravastatin 40 MG tablet    PRAVACHOL    90 tablet    TAKE 1 TABLET DAILY (DOSE CHANGE)    Mixed hyperlipidemia       tadalafil 20 MG tablet    CIALIS    18 tablet    Take 1 tablet (20 mg) by mouth daily as needed prior to sex. Do not use with nitroglycerin, terazosin or doxazosin.    Erectile dysfunction, unspecified erectile  dysfunction type       tamsulosin 0.4 MG capsule    FLOMAX    90 capsule    Take 1 capsule (0.4 mg) by mouth daily    Benign prostatic hyperplasia with lower urinary tract symptoms, symptom details unspecified
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
## 2018-10-17 NOTE — PATIENT INSTRUCTIONS
1. Call billing about EEG ambulatory cost for 2 days  2. Schedule ambulatory EEG, if affordable   3. Increase levetiracetam 1500 mg am and 2000 mg pm   4. Continue Carbamazepine  mg in the morning, 600 mg at noon, 600 mg at night.    5. Follow up  1 year     Carmen Patton MD

## 2018-10-17 NOTE — MR AVS SNAPSHOT
"              After Visit Summary   10/17/2018    Leighton Morales    MRN: [id number]           Patient Information     Date Of Birth          1955        Visit Information        Provider Department      10/17/2018 3:00 PM OX  [facility] - NURSE [facility]        Today's Diagnoses     Need for prophylactic vaccination and inoculation against influenza    -  1       Follow-ups after your visit        Future tests that were ordered for you today     Open Future Orders        Priority Expected Expires Ordered    ORDER:  EEG Ambulatory 24 hour  monitoring Routine  1/15/2019 10/17/2018            Who to contact     If you have questions or need follow up information about today's clinic visit or your schedule please contact [facility] directly at [phone].  Normal or non-critical lab and imaging results will be communicated to you by happyviewhart, letter or phone within 4 business days after the clinic has received the results. If you do not hear from us within 7 days, please contact the clinic through happyviewhart or phone. If you have a critical or abnormal lab result, we will notify you by phone as soon as possible.  Submit refill requests through [employer] or call your pharmacy and they will forward the refill request to us. Please allow 3 business days for your refill to be completed.          Additional Information About Your Visit        MyChart Information     [employer] lets you send messages to your doctor, view your test results, renew your prescriptions, schedule appointments and more. To sign up, go to [email or web address] . Click on \"Log in\" on the left side of the screen, which will take you to the Welcome page. Then click on \"Sign up Now\" on the right side of the page.     You will be asked to enter the access code listed below, as well as some personal information. Please follow the directions to create your username and password.     Your access code is: " [email or web address]  Expires: 2018  3:48 PM     Your access code will  in 90 days. If you need help or a new code, please call your [place] clinic or [phone].        Care EveryWhere ID     This is your Care EveryWhere ID. This could be used by other organizations to access your [place] medical records  [id number]         Blood Pressure from Last 3 Encounters:   10/17/18 128/76   17 118/76   10/23/17 116/70    Weight from Last 3 Encounters:   10/17/18 190 lb (86.2 kg)   17 187 lb 12.8 oz (85.2 kg)   10/23/17 186 lb (84.4 kg)              We Performed the Following     ADMIN 1st VACCINE     FLU VACCINE, (RIV4) RECOMBINANT HA  , IM (FluBlok, egg free) [84572]- >18 YRS ([facility] recommended  50-64 YRS)     Vaccine Administration, Each Additional [57035]     ZOSTER VACCINE RECOMBINANT ADJUVANTED IM NJX          Today's Medication Changes          These changes are accurate as of 10/17/18  3:17 PM.  If you have any questions, ask your nurse or doctor.               These medicines have changed or have updated prescriptions.        Dose/Directions    levETIRAcetam 1000 MG Tabs   This may have changed:  additional instructions   Used for:  Seizure disorder (H)   Changed by:  Carmen Patton MD        TAKE 1.5 TABLETS MORNING AND 2 TABLET AT NIGHT   Quantity:  315 tablet   Refills:  3            Where to get your medicines      These medications were sent to [facility] [address] [address]     Phone:  [phone]     carBAMazepine 400 MG 12 hr tablet    levETIRAcetam 1000 MG Tabs                Primary Care Provider Office Phone # Fax #    Los Mcelroy [phone]       [address] [place] [address]        Equal Access to Services     TORIBIO YU : Marium schaefer Sodrew, waaxda luqadaha, qaybta kaalnika flores. [facility] [phone].    ATENCIÓN: Si  antonio hull, tiene a rojas disposición servicios gratuitos de asistencia lingüística. Elli dawkins [phone].    We comply with applicable federal civil rights laws and [place] laws. We do not discriminate on the basis of race, color, national origin, age, disability, sex, sexual orientation, or gender identity.            Thank you!     Thank you for choosing [facility]  for your care. Our goal is always to provide you with excellent care. Hearing back from our patients is one way we can continue to improve our services. Please take a few minutes to complete the written survey that you may receive in the mail after your visit with us. Thank you!             Your Updated Medication List - Protect others around you: Learn how to safely use, store and throw away your medicines at www.disposemymeds.org.          This list is accurate as of 10/17/18  3:17 PM.  Always use your most recent med list.                   Brand Name Dispense Instructions for use Diagnosis    aspirin 81 MG tablet      Take 81 mg by mouth daily        carBAMazepine 400 MG 12 hr tablet    TEGretol XR    360 tablet    TAKE 1 TABLETS EVERY MORNING, 2 TABS EVERY AFTERNOON, AND 1 TABS EVERY EVENING    Partial epilepsy with intractable epilepsy (H), Seizure syndrome (H)       levETIRAcetam 1000 MG Tabs     315 tablet    TAKE 1.5 TABLETS MORNING AND 2 TABLET AT NIGHT    Seizure disorder (H)       MULTIVITAMINS PO      Take by mouth daily        OMEGA-3 FISH OIL PO      Take by mouth daily        pravastatin 40 MG tablet    PRAVACHOL    90 tablet    TAKE 1 TABLET DAILY (DOSE CHANGE)    Mixed hyperlipidemia       tadalafil 20 MG tablet    CIALIS    18 tablet    Take 1 tablet (20 mg) by mouth daily as needed prior to sex. Do not use with nitroglycerin, terazosin or doxazosin.    Erectile dysfunction, unspecified erectile dysfunction type       tamsulosin 0.4 MG capsule    FLOMAX    90 capsule    Take 1 capsule (0.4 mg) by mouth  daily    Benign prostatic hyperplasia with lower urinary tract symptoms, symptom details unspecified
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
## 2018-11-01 ENCOUNTER — TELEPHONE (OUTPATIENT)
Dept: NEUROLOGY | Facility: CLINIC | Age: 63
End: 2018-11-01

## 2018-11-01 NOTE — TELEPHONE ENCOUNTER
Phone Number: [phone]                     Caller: Lester Morales    Relationship to Patient: Self     Call Back Number: [phone]     Reason for Call: Pt want to speak to Dr. Owen nurse about an EEG that he is supposed to get, States he would like to update them on what is going on       Lester would like to figure out a time to have his EEG.  He is interested in having an ambulatory EEG performed prior to the end of the year.    Plan per Dr. Patton:  1. Call billing about EEG ambulatory cost for 2 days  2. Schedule ambulatory EEG, if affordable   3. Increase levetiracetam 1500 mg am and 2000 mg pm   4. Continue Carbamazepine  mg in the morning, 600 mg at noon, 600 mg at night.    5. Follow up  1 year      Carmen Patton MD      Lester does not feel an ambulatory EEG will be beneficial as he would need to go to work with the EEG in place.  Lester would like to have a two hour EEG performed in clinic and prefers to have this completed by the end of the year.        Tulio Cheney CMA Harper, Heather, RN                   Patient returning your call, he can be reach at [phone]      Advised Lester to contact Barbara Mata at [phone]

## 2018-11-21 DIAGNOSIS — N52.9 ERECTILE DYSFUNCTION, UNSPECIFIED ERECTILE DYSFUNCTION TYPE: ICD-10-CM

## 2018-11-21 RX ORDER — TADALAFIL 20 MG/1
20 TABLET ORAL DAILY PRN
Qty: 18 TABLET | Refills: 0 | Status: SHIPPED | OUTPATIENT
Start: 2018-11-21 | End: 2019-07-03

## 2018-11-21 NOTE — TELEPHONE ENCOUNTER
"Requested Prescriptions   Pending Prescriptions Disp Refills     tadalafil (CIALIS) 20 MG tablet 18 tablet 1     Sig: Take 1 tablet (20 mg) by mouth daily as needed prior to sex. Do not use with nitroglycerin, terazosin or doxazosin.    Erectile Dysfuction Protocol Failed    11/21/2018  9:10 AM       Failed - Absence of Alpha Blockers on Med list       Failed - Recent (12 mo) or future (30 days) visit within the authorizing provider's specialty    Patient had office visit in the last 12 months or has a visit in the next 30 days with authorizing provider or within the authorizing provider's specialty.  See \"Patient Info\" tab in inbasket, or \"Choose Columns\" in Meds & Orders section of the refill encounter.             Passed - Absence of nitrates on medication list       Passed - Patient is age 18 or older        Medication is being filled for 1 time refill only due to:  Patient needs to be seen because it has been more than one year since last visit.    "

## 2018-11-21 NOTE — LETTER
[facility]  [address] [address]  [phone]            Leighton Morales  [address]  [facility] [address]        November 21, 2018    Dear Leighton,    While refilling your prescription today, we noticed that you are due for an appointment with your provider.  We will refill your prescription for 30 days, but a follow-up appointment must be made before any additional refills can be approved.     Taking care of your health is important to us and we look forward to seeing you in the near future.  Please call us at [phone] or [phone] (or use [employer]) to schedule an appointment.     Please disregard this notice if you have already made an appointment.    Sincerely,        [facility]

## 2018-11-21 NOTE — TELEPHONE ENCOUNTER
Requested Prescriptions   Pending Prescriptions Disp Refills     tadalafil (CIALIS) 20 MG tablet 18 tablet 1     Sig: Take 1 tablet (20 mg) by mouth daily as needed prior to sex. Do not use with nitroglycerin, terazosin or doxazosin.    There is no refill protocol information for this order      Last Written Prescription Date:  12/29/17  Last Fill Quantity: 18,  # refills: 1   Last office visit: 10/23/2017 with prescribing provider:  10/23/17   Future Office Visit:  0

## 2018-12-07 ENCOUNTER — DOCUMENTATION ONLY (OUTPATIENT)
Dept: LAB | Facility: CLINIC | Age: 63
End: 2018-12-07

## 2018-12-07 DIAGNOSIS — E78.2 MIXED HYPERLIPIDEMIA: Primary | ICD-10-CM

## 2018-12-07 DIAGNOSIS — R56.9 CONVULSIONS, UNSPECIFIED CONVULSION TYPE (H): ICD-10-CM

## 2018-12-20 ENCOUNTER — OFFICE VISIT (OUTPATIENT)
Dept: INTERNAL MEDICINE | Facility: CLINIC | Age: 63
End: 2018-12-20
Payer: COMMERCIAL

## 2018-12-20 VITALS
SYSTOLIC BLOOD PRESSURE: 124 MMHG | DIASTOLIC BLOOD PRESSURE: 80 MMHG | WEIGHT: 194 LBS | RESPIRATION RATE: 16 BRPM | HEART RATE: 73 BPM | BODY MASS INDEX: 27.06 KG/M2 | TEMPERATURE: 98.4 F | OXYGEN SATURATION: 99 %

## 2018-12-20 DIAGNOSIS — N40.1 BENIGN PROSTATIC HYPERPLASIA WITH LOWER URINARY TRACT SYMPTOMS, SYMPTOM DETAILS UNSPECIFIED: ICD-10-CM

## 2018-12-20 DIAGNOSIS — Z12.11 SCREEN FOR COLON CANCER: ICD-10-CM

## 2018-12-20 DIAGNOSIS — N52.9 ERECTILE DYSFUNCTION, UNSPECIFIED ERECTILE DYSFUNCTION TYPE: ICD-10-CM

## 2018-12-20 DIAGNOSIS — E78.2 MIXED HYPERLIPIDEMIA: ICD-10-CM

## 2018-12-20 DIAGNOSIS — R56.9 CONVULSIONS, UNSPECIFIED CONVULSION TYPE (H): ICD-10-CM

## 2018-12-20 DIAGNOSIS — G25.81 RESTLESS LEG SYNDROME: ICD-10-CM

## 2018-12-20 DIAGNOSIS — Z11.4 SCREENING FOR HIV (HUMAN IMMUNODEFICIENCY VIRUS): ICD-10-CM

## 2018-12-20 DIAGNOSIS — Z00.01 ENCOUNTER FOR ROUTINE ADULT MEDICAL EXAM WITH ABNORMAL FINDINGS: ICD-10-CM

## 2018-12-20 LAB
ALBUMIN SERPL-MCNC: 4.1 G/DL (ref 3.4–5)
ALP SERPL-CCNC: 37 U/L (ref 40–150)
ALT SERPL W P-5'-P-CCNC: 21 U/L (ref 0–70)
ANION GAP SERPL CALCULATED.3IONS-SCNC: 8 MMOL/L (ref 3–14)
AST SERPL W P-5'-P-CCNC: 15 U/L (ref 0–45)
BILIRUB SERPL-MCNC: 0.3 MG/DL (ref 0.2–1.3)
BUN SERPL-MCNC: 9 MG/DL (ref 7–30)
CALCIUM SERPL-MCNC: 9.2 MG/DL (ref 8.5–10.1)
CHLORIDE SERPL-SCNC: 98 MMOL/L (ref 94–109)
CHOLEST SERPL-MCNC: 198 MG/DL
CK SERPL-CCNC: 166 U/L (ref 30–300)
CO2 SERPL-SCNC: 27 MMOL/L (ref 20–32)
CREAT SERPL-MCNC: 1 MG/DL (ref 0.66–1.25)
ERYTHROCYTE [DISTWIDTH] IN BLOOD BY AUTOMATED COUNT: 12.6 % (ref 10–15)
FERRITIN SERPL-MCNC: 167 NG/ML (ref 26–388)
GFR SERPL CREATININE-BSD FRML MDRD: 80 ML/MIN/{1.73_M2}
GLUCOSE SERPL-MCNC: 89 MG/DL (ref 70–99)
HCT VFR BLD AUTO: 43.9 % (ref 40–53)
HDLC SERPL-MCNC: 61 MG/DL
HGB BLD-MCNC: 15 G/DL (ref 13.3–17.7)
HIV 1+2 AB+HIV1 P24 AG SERPL QL IA: NONREACTIVE
IRON SATN MFR SERPL: 37 % (ref 15–46)
IRON SERPL-MCNC: 99 UG/DL (ref 35–180)
LDLC SERPL CALC-MCNC: 119 MG/DL
MCH RBC QN AUTO: 30.7 PG (ref 26.5–33)
MCHC RBC AUTO-ENTMCNC: 34.2 G/DL (ref 31.5–36.5)
MCV RBC AUTO: 90 FL (ref 78–100)
NONHDLC SERPL-MCNC: 137 MG/DL
PLATELET # BLD AUTO: 197 10E9/L (ref 150–450)
POTASSIUM SERPL-SCNC: 4.1 MMOL/L (ref 3.4–5.3)
PROT SERPL-MCNC: 7.4 G/DL (ref 6.8–8.8)
RBC # BLD AUTO: 4.89 10E12/L (ref 4.4–5.9)
SODIUM SERPL-SCNC: 133 MMOL/L (ref 133–144)
TIBC SERPL-MCNC: 268 UG/DL (ref 240–430)
TRIGL SERPL-MCNC: 88 MG/DL
WBC # BLD AUTO: 5.8 10E9/L (ref 4–11)

## 2018-12-20 PROCEDURE — 85027 COMPLETE CBC AUTOMATED: CPT | Performed by: INTERNAL MEDICINE

## 2018-12-20 PROCEDURE — 36415 COLL VENOUS BLD VENIPUNCTURE: CPT | Performed by: INTERNAL MEDICINE

## 2018-12-20 PROCEDURE — 99214 OFFICE O/P EST MOD 30 MIN: CPT | Mod: 25 | Performed by: INTERNAL MEDICINE

## 2018-12-20 PROCEDURE — 83540 ASSAY OF IRON: CPT | Performed by: INTERNAL MEDICINE

## 2018-12-20 PROCEDURE — 84270 ASSAY OF SEX HORMONE GLOBUL: CPT | Performed by: INTERNAL MEDICINE

## 2018-12-20 PROCEDURE — 82550 ASSAY OF CK (CPK): CPT | Performed by: INTERNAL MEDICINE

## 2018-12-20 PROCEDURE — 87389 HIV-1 AG W/HIV-1&-2 AB AG IA: CPT | Performed by: INTERNAL MEDICINE

## 2018-12-20 PROCEDURE — 82728 ASSAY OF FERRITIN: CPT | Performed by: INTERNAL MEDICINE

## 2018-12-20 PROCEDURE — 83550 IRON BINDING TEST: CPT | Performed by: INTERNAL MEDICINE

## 2018-12-20 PROCEDURE — 80061 LIPID PANEL: CPT | Performed by: INTERNAL MEDICINE

## 2018-12-20 PROCEDURE — 84403 ASSAY OF TOTAL TESTOSTERONE: CPT | Performed by: INTERNAL MEDICINE

## 2018-12-20 PROCEDURE — 80053 COMPREHEN METABOLIC PANEL: CPT | Performed by: INTERNAL MEDICINE

## 2018-12-20 PROCEDURE — 99396 PREV VISIT EST AGE 40-64: CPT | Performed by: INTERNAL MEDICINE

## 2018-12-20 RX ORDER — PRAVASTATIN SODIUM 40 MG
TABLET ORAL
Qty: 90 TABLET | Refills: 0 | Status: CANCELLED | OUTPATIENT
Start: 2018-12-20

## 2018-12-20 RX ORDER — TAMSULOSIN HYDROCHLORIDE 0.4 MG/1
0.4 CAPSULE ORAL DAILY
Qty: 90 CAPSULE | Refills: 3 | Status: CANCELLED | OUTPATIENT
Start: 2018-12-20

## 2018-12-20 RX ORDER — SILDENAFIL 100 MG/1
100 TABLET, FILM COATED ORAL DAILY PRN
Qty: 10 TABLET | Refills: 11 | Status: SHIPPED | OUTPATIENT
Start: 2018-12-20 | End: 2019-07-04

## 2018-12-20 RX ORDER — TAMSULOSIN HYDROCHLORIDE 0.4 MG/1
0.8 CAPSULE ORAL DAILY
Qty: 60 CAPSULE | Refills: 11
Start: 2018-12-20 | End: 2019-03-06

## 2018-12-20 NOTE — PATIENT INSTRUCTIONS
Stop Cialis   Viagra 100mg tab.  Do not take more than once a day.  Take   100mg (1 tab) as needed with sexual activity  The medication is taken within 1/2-2 hours before sexual activity. Possible side effects include heartburn, headache, lightheadedness, vision changes. Avoid alcohol when taking the medication. Never take Viagra if you are taking nitroglycerin medication. Stop if any vision changes    Change Tamsulosin/Flomax to 0.4mg tab, 2 tabs daily (ttoal 0.8mg) to see if helps urine flow and call update in 3-4 weeks. If better, will change prescription. If not, will stop med and refer to Urology  Labs for  Restless legs, testosterone  Continue other meds. Will address Pravastatin refill after labs back  Colonoscopy  with  [facility]. They will call to schedule. If not heard from them in 1 week, then call [phone].  Hold Aspirin 1 week prior to the procedure  Pt was informed regarding extra E&M billing for medical issues not related to preventative physical   Calorie/carbohydrate (sugar, bread, potato, pasta, etc) reduction in diet for weight loss.  Increase color on your plate with fruits and vegetables. Increase  frequency of walking or other aerobic exercise as able (goal is daily)

## 2018-12-20 NOTE — PROGRESS NOTES
SUBJECTIVE:   CC: Leighton Morales is an 63 year old male who presents for preventive health visit.     Healthy Habits:  Answers for HPI/ROS submitted by the patient on 2018   Annual Exam:  Frequency of exercise:: 1 day/week  Getting at least 3 servings of Calcium per day:: NO  Diet:: Regular (no restrictions)  Taking medications regularly:: Yes  Medication side effects:: None  Bi-annual eye exam:: Yes  Dental care twice a year:: Yes  Sleep apnea or symptoms of sleep apnea:: None  Additional concerns today:: Yes  Duration of exercise:: Less than 15 minutes        Today's PHQ-2 Score:   PHQ-2 (  [employer]) 10/17/2018 2017   Q1: Little interest or pleasure in doing things 0 0   Q2: Feeling down, depressed or hopeless 0 0   PHQ-2 Score 0 0   Q1: Little interest or pleasure in doing things - -   Q2: Feeling down, depressed or hopeless - -   PHQ-2 Score - -       Abuse: Current or Past(Physical, Sexual or Emotional)- No  Do you feel safe in your environment? Yes    Social History     Tobacco Use     Smoking status: Former Smoker     Packs/day: 0.50     Last attempt to quit: 1978     Years since quittin.3     Smokeless tobacco: Never Used   Substance Use Topics     Alcohol use: Yes     Comment: 6 oz whiskey/day at night     If you drink alcohol do you typically have >3 drinks per day or >7 drinks per week? No                   Last PSA:   PSA   Date Value Ref Range Status   10/23/2017 2.71 0 - 4 ug/L Final     Comment:     Assay Method:  Chemiluminescence using Siemens Vista analyzer       Reviewed orders with patient. Reviewed health maintenance and updated orders accordingly - Yes  Labs reviewed in EPIC    Reviewed and updated as needed this visit by clinical staff         Reviewed and updated as needed this visit by Provider            ROS:  CONSTITUTIONAL: NEGATIVE for fever, chills. Weight up 4 pounds  INTEGUMENTARY/SKIN: NEGATIVE for worrisome rashes, moles or lesions  EYES: NEGATIVE for vision  changes or irritation. Has glasses. [facility] exam Sept 2018 at [facility]  ENT: NEGATIVE for ear, mouth and throat problems. Rare  Nasal congestion  RESP: NEGATIVE for significant cough or SOB. Using CPAP every night without humidifier  CV: NEGATIVE for chest pain, palpitations or peripheral edema  GI: NEGATIVE for nausea, abdominal pain, heartburn, or change in bowel habits   male: negative for dysuria, hematuria urethral discharge. Still has slower urine stream despite Flomax 0.4mg daily and Cialis not effective now for ED  MUSCULOSKELETAL: NEGATIVE for significant arthralgias or myalgia  NEURO: NEGATIVE for weakness, dizziness or paresthesias. Hx sz disorder.  Last 7 years ago. On meds. Has some RLS sx at night with sleep  PSYCHIATRIC: NEGATIVE for changes in mood or affect    OBJECTIVE:   /80   Pulse 73   Temp 98.4  F (36.9  C) (Oral)   Resp 16   Wt 88 kg (194 lb)   SpO2 99%   BMI 27.06 kg/m    EXAM:  General appearance - healthy, alert, no distress  Skin - No rashes or lesions.  Head - normocephalic, atraumatic  Eyes - LIZ, EOMI, fundi exam with nondilated pupils negative.  Ears - External ears normal. Canals clear. TM's normal.  Nose/Sinuses - Nares normal. Septum midline. Mucosa normal. No drainage or sinus tenderness.  Oropharynx - No erythema, no adenopathy, no exudates.  Neck - Supple without adenopathy or thyromegaly. No bruits.  Lungs - Clear to auscultation without wheezes/rhonchi.  Heart - Regular rate and rhythm without murmurs, clicks, or gallops.  Nodes - No supraclavicular, axillary, or inguinal adenopathy palpable.  Abdomen - Abdomen soft, non-tender. BS normal. No masses or hepatosplenomegaly palpable. No bruits.  Extremities -No cyanosis, clubbing or edema.    Musculoskeletal - Spine ROM normal. Muscular strength intact.   Peripheral pulses - radial=4/4, femoral=4/4, posterior tibial=4/4, dorsalis pedis=4/4,  Neuro - Gait normal. Reflexes normal and symmetric. Sensation grossly  WNL.  Genital - Normal-appearing male external genitalia. No scrotal masses or inguinal hernia palpable.   Rectal - Guaic negative stool. Normal tone. Prostate 1 plus in size to palpation. No rectal masses or prostate nodularity palpable        ASSESSMENT/PLAN:   1. Encounter for routine adult medical exam with abnormal findings  See plan discussion below for healthcare maintenance    2. Mixed hyperlipidemia  Now on pravastatin and overdue for fasting lipids.  Labs will be drawn today as previously ordered.  Will adjust statin dosing based on results    3. Convulsions, unspecified convulsion type (H)  Stable.  No seizure activity in many years.  Continue antiseizure medication per neurology    4. Benign prostatic hyperplasia with lower urinary tract symptoms, symptom details unspecified  Symptoms not controlled.  Will increase tamsulosin to 0.8 mg daily.  If not improving, patient will inform physician and will refer to urology  - tamsulosin (FLOMAX) 0.4 MG capsule; Take 2 capsules (0.8 mg) by mouth daily  Dispense: 60 capsule; Refill: 11  - OFFICE/OUTPT VISIT,EST,LEVL III    5. Erectile dysfunction, unspecified erectile dysfunction type  Not controlled with Cialis.  Will change to Viagra.  See plan discussion below.  We will also check testosterone levels  - sildenafil (VIAGRA) 100 MG tablet; Take 1 tablet (100 mg) by mouth daily as needed (sexual activity)  Dispense: 10 tablet; Refill: 11  - OFFICE/OUTPT VISIT,EST,LEVL III  - Testosterone Free and Total    6. Restless leg syndrome  Patient states he is alert right after symptoms of restless legs waking him up at night or if his girlfriend bumps him awake when happening so seizure activity unlikely.  Electrolyte levels to be checked as per previous lab order.  Will check iron levels in addition.  If all normal, then possible Mirapex trial but will also run this by patient's neurologist in case he wishes to screen patient further for atypical focal seizure  "activity  - Iron and iron binding capacity  - Ferritin    7. Screen for colon cancer  Due for colonoscopy screening  - GASTROENTEROLOGY ADULT REF PROCEDURE ONLY Other; [facility] [phone]    8. Screening for HIV (human immunodeficiency virus)   HIV screening recommendation per USPSTF guideline discussed with pt. Pt agrees.   - HIV Antigen Antibody Combo      COUNSELING:  Reviewed preventive health counseling, as reflected in patient instructions    BP Readings from Last 1 Encounters:   10/17/18 128/76     Estimated body mass index is 26.5 kg/m  as calculated from the following:    Height as of 11/16/17: 1.803 m (5' 11\").    Weight as of 10/17/18: 86.2 kg (190 lb).    BP Screening:   Last 3 BP Readings:    BP Readings from Last 3 Encounters:   12/20/18 124/80   10/17/18 128/76   11/16/17 118/76       The following was recommended to the patient:  Re-screen BP within a year and recommended lifestyle modifications  Weight management plan: Discussed healthy diet and exercise guidelines     reports that he quit smoking about 40 years ago. He smoked 0.50 packs per day. he has never used smokeless tobacco.      Counseling Resources:  ATP IV Guidelines  Pooled Cohorts Equation Calculator  FRAX Risk Assessment  ICSI Preventive Guidelines  Dietary Guidelines for Americans, 2010  USDA's MyPlate  ASA Prophylaxis  Lung CA Screening      PLAN:  Stop Cialis   Viagra 100mg tab.  Do not take more than once a day.  Take   100mg (1 tab) as needed with sexual activity  The medication is taken within 1/2-2 hours before sexual activity. Possible side effects include heartburn, headache, lightheadedness, vision changes. Avoid alcohol when taking the medication. Never take Viagra if you are taking nitroglycerin medication. Stop if any vision changes    Change Tamsulosin/Flomax to 0.4mg tab, 2 tabs daily (ttoal 0.8mg) to see if helps urine flow and call update in 3-4 weeks. If better, will change prescription. If not, will stop med and " refer to Urology  Labs for  Restless legs, testosterone  Continue other meds. Will address Pravastatin refill after labs back  Colonoscopy  with  [facility]. They will call to schedule. If not heard from them in 1 week, then call [phone].  Hold Aspirin 1 week prior to the procedure  Pt was informed regarding extra E&M billing for medical issues not related to preventative physical   Calorie/carbohydrate (sugar, bread, potato, pasta, etc) reduction in diet for weight loss.  Increase color on your plate with fruits and vegetables. Increase  frequency of walking or other aerobic exercise as able (goal is daily)                      Los Mcelroy MD  [facility]

## 2018-12-20 NOTE — LETTER
[facility]  [address] [address]  [phone]      1/2/2019       Leighton Morales  [address] [facility] [address]        Dear Leighton,  Here are your most recent lab results. Unless commented on below, mild variation of results  outside the normal range are not clinically signicant.    Resulted Orders   CK total   Result Value Ref Range    CK Total 166 30 - 300 U/L   Lipid panel reflex to direct LDL Fasting   Result Value Ref Range    Cholesterol 198 <200 mg/dL    Triglycerides 88 <150 mg/dL      Comment:      Fasting specimen    HDL Cholesterol 61 >39 mg/dL    LDL Cholesterol Calculated 119 (H) <100 mg/dL      Comment:      Above desirable:  100-129 mg/dl  Borderline High:  130-159 mg/dL  High:             160-189 mg/dL  Very high:       >189 mg/dl      Non HDL Cholesterol 137 (H) <130 mg/dL      Comment:      Above Desirable:  130-159 mg/dl  Borderline high:  160-189 mg/dl  High:             190-219 mg/dl  Very high:       >219 mg/dl     CBC with platelets   Result Value Ref Range    WBC 5.8 4.0 - 11.0 10e9/L    RBC Count 4.89 4.4 - 5.9 10e12/L    Hemoglobin 15.0 13.3 - 17.7 g/dL    Hematocrit 43.9 40.0 - 53.0 %    MCV 90 78 - 100 fl    MCH 30.7 26.5 - 33.0 pg    MCHC 34.2 31.5 - 36.5 g/dL    RDW 12.6 10.0 - 15.0 %    Platelet Count 197 150 - 450 10e9/L   Comprehensive metabolic panel   Result Value Ref Range    Sodium 133 133 - 144 mmol/L    Potassium 4.1 3.4 - 5.3 mmol/L    Chloride 98 94 - 109 mmol/L    Carbon Dioxide 27 20 - 32 mmol/L    Anion Gap 8 3 - 14 mmol/L    Glucose 89 70 - 99 mg/dL      Comment:      Fasting specimen    Urea Nitrogen 9 7 - 30 mg/dL    Creatinine 1.00 0.66 - 1.25 mg/dL    GFR Estimate 80 >60 mL/min/[1.73_m2]      Comment:      Non  GFR Calc  Starting 12/18/2018, serum creatinine based estimated GFR (eGFR) will be   calculated using the Chronic Kidney Disease Epidemiology Collaboration   (CKD-EPI) equation.      GFR  "Estimate If Black >90 >60 mL/min/[1.73_m2]      Comment:       GFR Calc  Starting 12/18/2018, serum creatinine based estimated GFR (eGFR) will be   calculated using the Chronic Kidney Disease Epidemiology Collaboration   (CKD-EPI) equation.      Calcium 9.2 8.5 - 10.1 mg/dL    Bilirubin Total 0.3 0.2 - 1.3 mg/dL    Albumin 4.1 3.4 - 5.0 g/dL    Protein Total 7.4 6.8 - 8.8 g/dL    Alkaline Phosphatase 37 (L) 40 - 150 U/L    ALT 21 0 - 70 U/L    AST 15 0 - 45 U/L       Total Cholesterol, HDL (good) Cholesterol, Triglycerides, Electrolyte, Glucose/Sugar, Hemoglobin, Kidney function, Liver, Platelets and White Blood Cells lab results were normal.  LDL (bad) Cholesterol and Non-HDL (bad) cholesterol lab results were still abnormal but have improved compared to previous studies with the previous pravastatin dosage increase to 40 mg daily.  Abnormalities of these lab values increase the risk for heart and other vascular disease.   Therefore, I would recommend increasing your pravastatin dosage further to get the bad cholesterol levels to goal.  With your remaining pravastatin 40 mg tablets, increase that dosage to 2 tablets (total 80 mg) daily until you run out of those tablets.  Then change to pravastatin 80 mg tablet, 1 tablet daily.  A prescription for the higher 80 mg tablet has been sent to your [facility] mail order pharmacy  Call our appointment desk at [phone] to schedule a \"lab only\" to have your CK, Lipid profile (Cholesterol Panel) and Liver Panel checked fasting in 4-6 weeks.  For fasting labs, please refrain from eating for 8 hours or more.  Be sure to  drink water and take your  medications the day of the test.    If you have further questions/concerns regarding the results, I would ask that you bring them to your next follow-up appointment with me and I would be happy to review them with you further.      Sincerely,      Los Mcelroy MD  Internal Medicine      "

## 2018-12-22 LAB
SHBG SERPL-SCNC: 80 NMOL/L (ref 11–80)
TESTOST FREE SERPL-MCNC: 8.56 NG/DL (ref 4.7–24.4)
TESTOST SERPL-MCNC: 730 NG/DL (ref 240–950)

## 2019-01-02 RX ORDER — PRAVASTATIN SODIUM 80 MG/1
80 TABLET ORAL DAILY
Qty: 90 TABLET | Refills: 3 | Status: SHIPPED | OUTPATIENT
Start: 2019-01-02 | End: 2020-01-09

## 2019-01-04 DIAGNOSIS — N40.1 BENIGN PROSTATIC HYPERPLASIA WITH LOWER URINARY TRACT SYMPTOMS, SYMPTOM DETAILS UNSPECIFIED: ICD-10-CM

## 2019-01-04 RX ORDER — TAMSULOSIN HYDROCHLORIDE 0.4 MG/1
0.8 CAPSULE ORAL DAILY
Qty: 180 CAPSULE | Refills: 3 | Status: SHIPPED | OUTPATIENT
Start: 2019-01-04 | End: 2020-01-09

## 2019-01-04 NOTE — TELEPHONE ENCOUNTER
"Requested Prescriptions   Pending Prescriptions Disp Refills     tamsulosin (FLOMAX) 0.4 MG capsule [Pharmacy Med Name: TAMSULOSIN HCL CAPS 0.4MG] 90 capsule 3    Last Written Prescription Date:  12/20/2018  Last Fill Quantity: 60,  # refills: 11   Last office visit: 12/20/2018 with prescribing provider:  12/20/2018   Future Office Visit:     Sig: TAKE 1 CAPSULE DAILY    Alpha Blockers Failed - 1/4/2019  9:33 AM       Failed - Patient does not have Tadalafil, Vardenafil, or Sildenafil on their medication list       Passed - Blood pressure under 140/90 in past 12 months    BP Readings from Last 3 Encounters:   12/20/18 124/80   10/17/18 128/76   11/16/17 118/76                Passed - Recent (12 mo) or future (30 days) visit within the authorizing provider's specialty    Patient had office visit in the last 12 months or has a visit in the next 30 days with authorizing provider or within the authorizing provider's specialty.  See \"Patient Info\" tab in inbasket, or \"Choose Columns\" in Meds & Orders section of the refill encounter.             Passed - Patient is 18 years of age or older          "

## 2019-01-04 NOTE — TELEPHONE ENCOUNTER
Routing refill request to provider for review/approval because:  Patient does have Tadalafil, Vardenafil, or Sildenafil on their medication list

## 2019-03-06 ENCOUNTER — TELEPHONE (OUTPATIENT)
Dept: INTERNAL MEDICINE | Facility: CLINIC | Age: 64
End: 2019-03-06

## 2019-03-06 NOTE — TELEPHONE ENCOUNTER
Patient called regarding a prostate exam he has questions about this it was brought up at his physical and someone was supposed to call him regarding this please call at phone [phone]

## 2019-03-06 NOTE — TELEPHONE ENCOUNTER
Try to call patient.  No answer.  Left message that triage nursing will try to follow-up with the patient again tomorrow.  Patient last seen December 20.  At that time, there was no plan for us to be contacting the patient again.  Instead, patient was to increase his tamsulosin dose and patient was to call and update to us 3-4 weeks after that regarding his urine flow and if not improved, we would then be referring patient to urology.  No update was ever received by the patient.  Copy of part of the instructions from the December visit as below:    PLAN:  Stop Cialis   Viagra 100mg tab.  Do not take more than once a day.  Take   100mg (1 tab) as needed with sexual activity  The medication is taken within 1/2-2 hours before sexual activity. Possible side effects include heartburn, headache, lightheadedness, vision changes. Avoid alcohol when taking the medication. Never take Viagra if you are taking nitroglycerin medication. Stop if any vision changes    Change Tamsulosin/Flomax to 0.4mg tab, 2 tabs daily (ttoal 0.8mg) to see if helps urine flow and call update in 3-4 weeks. If better, will change prescription. If not, will stop med and refer to Urology      Find out if patient's urine flow issues are doing well now with the higher dose of tamsulosin and patient to continue current dose of working well.  If not, then route message to me and will refer patient to urology    Patient was also sent a lab letter in early January increasing his pravastatin dose  and was instructed to have fasting labs rechecked 1 month later.  Patient has not scheduled that appointment.  Please assist the patient with scheduling a follow-up fasting lab to recheck lipids with pravastatin dose increase

## 2019-03-07 NOTE — TELEPHONE ENCOUNTER
Pt advised of message.  Pt increased dose of Tamsolosin and urine flow is much better.  Will continue on current dose.  He will make lab appt soon.

## 2019-03-07 NOTE — TELEPHONE ENCOUNTER
Pt on the line with someone.  Requesting that I email him the message.  Printed and emailed to pt.  He will call back with response.

## 2019-03-11 NOTE — TELEPHONE ENCOUNTER
Med refilled 1/4/19 for 90 day  Supply with RFs for 1 year so if working well, then will have pt continue med as he is doing. Pt has been instructed  Re: fasting future lab sy nad orders in Epic. Will therefore close encounter

## 2019-06-04 ENCOUNTER — TELEPHONE (OUTPATIENT)
Dept: INTERNAL MEDICINE | Facility: CLINIC | Age: 64
End: 2019-06-04

## 2019-06-04 NOTE — TELEPHONE ENCOUNTER
Patient called wanting a copy of his immunization record faxed to him. Record faxed: [phone].

## 2019-07-03 DIAGNOSIS — N52.9 ERECTILE DYSFUNCTION, UNSPECIFIED ERECTILE DYSFUNCTION TYPE: ICD-10-CM

## 2019-07-03 NOTE — TELEPHONE ENCOUNTER
Pt overdue for fasting labs. Was supposed to have them done in February and no lab abelardo[t made. Call ptr and get fasting lab appt scheduled in the  next 1-2 weeks. After lab appt made, then may route RF request back top me to address

## 2019-07-03 NOTE — TELEPHONE ENCOUNTER
"Requested Prescriptions   Pending Prescriptions Disp Refills     sildenafil (VIAGRA) 100 MG tablet 10 tablet 11     Sig: Take 1 tablet (100 mg) by mouth daily as needed (sexual activity)       Erectile Dysfuction Protocol Failed - 7/3/2019 11:00 AM        Failed - Absence of Alpha Blockers on Med list        Passed - Absence of nitrates on medication list        Passed - Recent (12 mo) or future (30 days) visit within the authorizing provider's specialty     Patient had office visit in the last 12 months or has a visit in the next 30 days with authorizing provider or within the authorizing provider's specialty.  See \"Patient Info\" tab in inbasket, or \"Choose Columns\" in Meds & Orders section of the refill encounter.              Passed - Medication is active on med list        Passed - Patient is age 18 or older          Last Written Prescription Date:  12/20/2018  Last Fill Quantity: 10,  # refills: 11   Last office visit: 12/20/2018 with prescribing provider:  Los Mcelroy     Future Office Visit:      Routing refill request to provider for review/approval because:  Failed protocol d/t alpha blockers.    Yohana CHAVEZ RN, BSN, PHN          "

## 2019-07-04 RX ORDER — SILDENAFIL 100 MG/1
100 TABLET, FILM COATED ORAL DAILY PRN
Qty: 10 TABLET | Refills: 11 | Status: SHIPPED | OUTPATIENT
Start: 2019-07-04 | End: 2020-01-09

## 2019-07-17 DIAGNOSIS — E78.2 MIXED HYPERLIPIDEMIA: ICD-10-CM

## 2019-07-17 LAB
ALBUMIN SERPL-MCNC: 3.9 G/DL (ref 3.4–5)
ALP SERPL-CCNC: 47 U/L (ref 40–150)
ALT SERPL W P-5'-P-CCNC: 23 U/L (ref 0–70)
AST SERPL W P-5'-P-CCNC: 13 U/L (ref 0–45)
BILIRUB DIRECT SERPL-MCNC: 0.2 MG/DL (ref 0–0.2)
BILIRUB SERPL-MCNC: 0.5 MG/DL (ref 0.2–1.3)
CHOLEST SERPL-MCNC: 176 MG/DL
CK SERPL-CCNC: 165 U/L (ref 30–300)
HDLC SERPL-MCNC: 60 MG/DL
LDLC SERPL CALC-MCNC: 96 MG/DL
NONHDLC SERPL-MCNC: 116 MG/DL
PROT SERPL-MCNC: 7.3 G/DL (ref 6.8–8.8)
TRIGL SERPL-MCNC: 98 MG/DL

## 2019-07-17 PROCEDURE — 80076 HEPATIC FUNCTION PANEL: CPT | Performed by: INTERNAL MEDICINE

## 2019-07-17 PROCEDURE — 80061 LIPID PANEL: CPT | Performed by: INTERNAL MEDICINE

## 2019-07-17 PROCEDURE — 36415 COLL VENOUS BLD VENIPUNCTURE: CPT | Performed by: INTERNAL MEDICINE

## 2019-07-17 PROCEDURE — 82550 ASSAY OF CK (CPK): CPT | Performed by: INTERNAL MEDICINE

## 2019-07-26 ENCOUNTER — TELEPHONE (OUTPATIENT)
Dept: INTERNAL MEDICINE | Facility: CLINIC | Age: 64
End: 2019-07-26

## 2019-07-26 DIAGNOSIS — G40.909 SEIZURE DISORDER (H): ICD-10-CM

## 2019-07-26 DIAGNOSIS — R56.9 CONVULSIONS (H): Primary | ICD-10-CM

## 2019-07-26 NOTE — TELEPHONE ENCOUNTER
Pt called for labs reported all WNL . PT asking for a referral to [facility] even though I explained he should not need. Entered per his request in case. Please sign .Dina Carlson RN

## 2019-07-26 NOTE — TELEPHONE ENCOUNTER
[address] [address]  [phone]        January 10, 2019    Carlos Eduardo Ware  [address] [facility] [address]              Dear Carlos Eduardo Ware    This is to remind you that your Non-fasting lab is due.    You may call our office at [phone] to schedule an appointment.    Please disregard this notice if you have already had your labs drawn or made an appointment.        Sincerely,        Radha Tristan MD           signed

## 2019-10-22 ENCOUNTER — OFFICE VISIT (OUTPATIENT)
Dept: NEUROLOGY | Facility: CLINIC | Age: 64
End: 2019-10-22

## 2019-10-22 VITALS
HEIGHT: 71 IN | DIASTOLIC BLOOD PRESSURE: 70 MMHG | WEIGHT: 194.8 LBS | BODY MASS INDEX: 27.27 KG/M2 | SYSTOLIC BLOOD PRESSURE: 108 MMHG | HEART RATE: 90 BPM

## 2019-10-22 DIAGNOSIS — G40.119 PARTIAL EPILEPSY WITH INTRACTABLE EPILEPSY (H): ICD-10-CM

## 2019-10-22 DIAGNOSIS — G40.909 SEIZURE DISORDER (H): ICD-10-CM

## 2019-10-22 DIAGNOSIS — G40.909 SEIZURE SYNDROME (H): ICD-10-CM

## 2019-10-22 RX ORDER — LEVETIRACETAM 1000 MG/1
TABLET ORAL
Qty: 270 TABLET | Refills: 3 | Status: SHIPPED | OUTPATIENT
Start: 2019-10-22 | End: 2020-01-22

## 2019-10-22 RX ORDER — CARBAMAZEPINE 400 MG/1
TABLET, EXTENDED RELEASE ORAL
Qty: 360 TABLET | Refills: 3 | Status: SHIPPED | OUTPATIENT
Start: 2019-10-22 | End: 2020-01-22

## 2019-10-22 ASSESSMENT — MIFFLIN-ST. JEOR: SCORE: 1700.74

## 2019-10-22 ASSESSMENT — PAIN SCALES - GENERAL: PAINLEVEL: NO PAIN (0)
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
## 2019-10-22 NOTE — PATIENT INSTRUCTIONS
Decrease levetiracetam 1500 mg morning and night    Continue Carbamazepine  mg  morning and night    Video EEG and neuropsych testing, he will check with insurance on cost   Nurse compliance visit in 2-3 weeks  Follow-up Abdi 3 months  Call [facility] about your Medical right BENNETT Patton MD

## 2019-10-28 DIAGNOSIS — G40.909 SEIZURE DISORDER (H): ICD-10-CM

## 2019-10-28 DIAGNOSIS — G40.119 PARTIAL EPILEPSY WITH INTRACTABLE EPILEPSY (H): ICD-10-CM

## 2019-10-28 DIAGNOSIS — G40.909 SEIZURE SYNDROME (H): ICD-10-CM

## 2019-10-29 RX ORDER — CARBAMAZEPINE 400 MG/1
TABLET, EXTENDED RELEASE ORAL
Qty: 360 TABLET | Refills: 4 | OUTPATIENT
Start: 2019-10-29

## 2019-10-29 RX ORDER — LEVETIRACETAM 1000 MG/1
TABLET ORAL
Qty: 315 TABLET | Refills: 4 | OUTPATIENT
Start: 2019-10-29

## 2020-01-09 ENCOUNTER — OFFICE VISIT (OUTPATIENT)
Dept: INTERNAL MEDICINE | Facility: CLINIC | Age: 65
End: 2020-01-09
Payer: COMMERCIAL

## 2020-01-09 VITALS
RESPIRATION RATE: 16 BRPM | DIASTOLIC BLOOD PRESSURE: 70 MMHG | BODY MASS INDEX: 27.02 KG/M2 | TEMPERATURE: 98.2 F | WEIGHT: 193 LBS | HEIGHT: 71 IN | OXYGEN SATURATION: 97 % | HEART RATE: 82 BPM | SYSTOLIC BLOOD PRESSURE: 110 MMHG

## 2020-01-09 DIAGNOSIS — R56.9 CONVULSIONS, UNSPECIFIED CONVULSION TYPE (H): ICD-10-CM

## 2020-01-09 DIAGNOSIS — Z12.11 SPECIAL SCREENING FOR MALIGNANT NEOPLASMS, COLON: ICD-10-CM

## 2020-01-09 DIAGNOSIS — Z12.5 SCREENING FOR PROSTATE CANCER: ICD-10-CM

## 2020-01-09 DIAGNOSIS — I73.00 RAYNAUD'S DISEASE WITHOUT GANGRENE: ICD-10-CM

## 2020-01-09 DIAGNOSIS — N52.9 ERECTILE DYSFUNCTION, UNSPECIFIED ERECTILE DYSFUNCTION TYPE: ICD-10-CM

## 2020-01-09 DIAGNOSIS — Z00.01 ENCOUNTER FOR ROUTINE ADULT MEDICAL EXAM WITH ABNORMAL FINDINGS: ICD-10-CM

## 2020-01-09 DIAGNOSIS — E78.2 MIXED HYPERLIPIDEMIA: ICD-10-CM

## 2020-01-09 DIAGNOSIS — Z23 NEED FOR PROPHYLACTIC VACCINATION AND INOCULATION AGAINST INFLUENZA: ICD-10-CM

## 2020-01-09 DIAGNOSIS — N40.1 BENIGN PROSTATIC HYPERPLASIA WITH LOWER URINARY TRACT SYMPTOMS, SYMPTOM DETAILS UNSPECIFIED: ICD-10-CM

## 2020-01-09 LAB
ALBUMIN SERPL-MCNC: 3.8 G/DL (ref 3.4–5)
ALP SERPL-CCNC: 50 U/L (ref 40–150)
ALT SERPL W P-5'-P-CCNC: 16 U/L (ref 0–70)
ANION GAP SERPL CALCULATED.3IONS-SCNC: 7 MMOL/L (ref 3–14)
AST SERPL W P-5'-P-CCNC: 9 U/L (ref 0–45)
BILIRUB SERPL-MCNC: 0.4 MG/DL (ref 0.2–1.3)
BUN SERPL-MCNC: 12 MG/DL (ref 7–30)
CALCIUM SERPL-MCNC: 9.1 MG/DL (ref 8.5–10.1)
CHLORIDE SERPL-SCNC: 100 MMOL/L (ref 94–109)
CHOLEST SERPL-MCNC: 177 MG/DL
CO2 SERPL-SCNC: 27 MMOL/L (ref 20–32)
CREAT SERPL-MCNC: 1.06 MG/DL (ref 0.66–1.25)
GFR SERPL CREATININE-BSD FRML MDRD: 74 ML/MIN/{1.73_M2}
GLUCOSE SERPL-MCNC: 95 MG/DL (ref 70–99)
HDLC SERPL-MCNC: 62 MG/DL
LDLC SERPL CALC-MCNC: 97 MG/DL
NONHDLC SERPL-MCNC: 115 MG/DL
POTASSIUM SERPL-SCNC: 4.3 MMOL/L (ref 3.4–5.3)
PROT SERPL-MCNC: 7.5 G/DL (ref 6.8–8.8)
PSA SERPL-ACNC: 1.53 UG/L (ref 0–4)
SODIUM SERPL-SCNC: 134 MMOL/L (ref 133–144)
TRIGL SERPL-MCNC: 92 MG/DL

## 2020-01-09 PROCEDURE — 80053 COMPREHEN METABOLIC PANEL: CPT | Performed by: INTERNAL MEDICINE

## 2020-01-09 PROCEDURE — G0103 PSA SCREENING: HCPCS | Performed by: INTERNAL MEDICINE

## 2020-01-09 PROCEDURE — 90682 RIV4 VACC RECOMBINANT DNA IM: CPT | Performed by: INTERNAL MEDICINE

## 2020-01-09 PROCEDURE — 99396 PREV VISIT EST AGE 40-64: CPT | Mod: 25 | Performed by: INTERNAL MEDICINE

## 2020-01-09 PROCEDURE — 36415 COLL VENOUS BLD VENIPUNCTURE: CPT | Performed by: INTERNAL MEDICINE

## 2020-01-09 PROCEDURE — 86038 ANTINUCLEAR ANTIBODIES: CPT | Performed by: INTERNAL MEDICINE

## 2020-01-09 PROCEDURE — 90471 IMMUNIZATION ADMIN: CPT | Performed by: INTERNAL MEDICINE

## 2020-01-09 PROCEDURE — 99214 OFFICE O/P EST MOD 30 MIN: CPT | Mod: 25 | Performed by: INTERNAL MEDICINE

## 2020-01-09 PROCEDURE — 80061 LIPID PANEL: CPT | Performed by: INTERNAL MEDICINE

## 2020-01-09 RX ORDER — PRAVASTATIN SODIUM 80 MG/1
80 TABLET ORAL DAILY
Qty: 90 TABLET | Refills: 3 | Status: SHIPPED | OUTPATIENT
Start: 2020-01-09 | End: 2020-01-20

## 2020-01-09 RX ORDER — TAMSULOSIN HYDROCHLORIDE 0.4 MG/1
0.8 CAPSULE ORAL DAILY
Qty: 180 CAPSULE | Refills: 3 | Status: SHIPPED | OUTPATIENT
Start: 2020-01-09 | End: 2020-01-20

## 2020-01-09 RX ORDER — SILDENAFIL 100 MG/1
100 TABLET, FILM COATED ORAL DAILY PRN
Qty: 10 TABLET | Refills: 11 | Status: SHIPPED | OUTPATIENT
Start: 2020-01-09 | End: 2020-08-18

## 2020-01-09 ASSESSMENT — MIFFLIN-ST. JEOR: SCORE: 1687.57

## 2020-01-09 NOTE — PATIENT INSTRUCTIONS
Continue current meds  Prescriptions refilled.    Labs today as ordered  Flu vaccine  Reduce caffeine intake in colder weather to see if helps colder feet. If worsens and bothersome, then have options of meds to dilate blood vessels further   Follow-up with Neurology re: future  Video EEG  Colonoscopy  with  [facility]. They will call to schedule. If not heard from them in 1 week, then call [phone].    Hold Aspirin 1 week prior to the colonoscopy  Schedule an eye appointment  Pt was informed regarding extra E&M billing for management of new or established medical issues not related to today's wellness visit   Healthcare  Directive discussed and given copy.   Patient to complete and return to clinic

## 2020-01-09 NOTE — LETTER
[facility]  [address] [address]  [phone]      1/14/2020       Leighton Morales  [address] [facility] [address]        Dear Leighton,  Here are your most recent lab results. Unless commented on below, mild variation of results  outside the normal range are not clinically signicant.    Resulted Orders   Anti Nuclear Flores IgG by IFA with Reflex   Result Value Ref Range    MARIAH interpretation Negative NEG^Negative      Comment:                                         Reference range:  <1:40  NEGATIVE  1:40 - 1:80  BORDERLINE POSITIVE  >1:80 POSITIVE     Comprehensive metabolic panel   Result Value Ref Range    Sodium 134 133 - 144 mmol/L    Potassium 4.3 3.4 - 5.3 mmol/L    Chloride 100 94 - 109 mmol/L    Carbon Dioxide 27 20 - 32 mmol/L    Anion Gap 7 3 - 14 mmol/L    Glucose 95 70 - 99 mg/dL      Comment:      Fasting specimen    Urea Nitrogen 12 7 - 30 mg/dL    Creatinine 1.06 0.66 - 1.25 mg/dL    GFR Estimate 74 >60 mL/min/[1.73_m2]      Comment:      Non  GFR Calc  Starting 12/18/2018, serum creatinine based estimated GFR (eGFR) will be   calculated using the Chronic Kidney Disease Epidemiology Collaboration   (CKD-EPI) equation.      GFR Estimate If Black 85 >60 mL/min/[1.73_m2]      Comment:       GFR Calc  Starting 12/18/2018, serum creatinine based estimated GFR (eGFR) will be   calculated using the Chronic Kidney Disease Epidemiology Collaboration   (CKD-EPI) equation.      Calcium 9.1 8.5 - 10.1 mg/dL    Bilirubin Total 0.4 0.2 - 1.3 mg/dL    Albumin 3.8 3.4 - 5.0 g/dL    Protein Total 7.5 6.8 - 8.8 g/dL    Alkaline Phosphatase 50 40 - 150 U/L    ALT 16 0 - 70 U/L    AST 9 0 - 45 U/L   Lipid panel reflex to direct LDL Fasting   Result Value Ref Range    Cholesterol 177 <200 mg/dL    Triglycerides 92 <150 mg/dL      Comment:      Fasting specimen    HDL Cholesterol 62 >39 mg/dL    LDL Cholesterol Calculated 97 <100 mg/dL       Comment:      Desirable:       <100 mg/dl    Non HDL Cholesterol 115 <130 mg/dL   Prostate spec antigen screen   Result Value Ref Range    PSA 1.53 0 - 4 ug/L      Comment:      Assay Method:  Chemiluminescence using Siemens Vista analyzer       Total Cholesterol, HDL (good) Cholesterol, LDL (bad) Cholesterol, Non-HDL (bad) cholesterol, Triglycerides, Electrolyte, Glucose/Sugar, Kidney function, Liver and Prostate cancer screening lab results were normal.  No evidence of  Lupus as a cause of your colder feet sensation.  Continue current medication.  Please contact your pharmacy if you need further refills of your medication.  Call our appointment desk at [phone] or use [facility] to schedule a lab appointment again fasting  in  1 year.  For fasting labs, please refrain from eating for 8 hours or more.  Be sure to  drink water and take your  medications the day of the test.  Please make an appointment to see me  a few days after the lab test is drawn to discuss results and follow-up on your medical issues or earlier as needed.    If you have further questions/concerns regarding the results, I would ask that you bring them to your next follow-up appointment with me and I would be happy to review them with you further.      Sincerely,      Los Mcelroy MD  Internal Medicine

## 2020-01-10 LAB — ANA SER QL IF: NEGATIVE

## 2020-01-20 DIAGNOSIS — E78.2 MIXED HYPERLIPIDEMIA: ICD-10-CM

## 2020-01-20 DIAGNOSIS — N40.1 BENIGN PROSTATIC HYPERPLASIA WITH LOWER URINARY TRACT SYMPTOMS, SYMPTOM DETAILS UNSPECIFIED: ICD-10-CM

## 2020-01-20 RX ORDER — TAMSULOSIN HYDROCHLORIDE 0.4 MG/1
0.8 CAPSULE ORAL DAILY
Qty: 180 CAPSULE | Refills: 3 | Status: SHIPPED | OUTPATIENT
Start: 2020-01-20 | End: 2021-01-22

## 2020-01-20 RX ORDER — PRAVASTATIN SODIUM 80 MG/1
80 TABLET ORAL DAILY
Qty: 90 TABLET | Refills: 3 | Status: SHIPPED | OUTPATIENT
Start: 2020-01-20 | End: 2021-01-22

## 2020-01-20 NOTE — TELEPHONE ENCOUNTER
"Requested Prescriptions   Pending Prescriptions Disp Refills     tamsulosin (FLOMAX) 0.4 MG capsule 180 capsule 3     Sig: Take 2 capsules (0.8 mg) by mouth daily       Alpha Blockers Failed - 1/20/2020  9:03 AM        Failed - Patient does not have Tadalafil, Vardenafil, or Sildenafil on their medication list        Passed - Blood pressure under 140/90 in past 12 months     BP Readings from Last 3 Encounters:   01/09/20 110/70   10/22/19 108/70   12/20/18 124/80                 Passed - Recent (12 mo) or future (30 days) visit within the authorizing provider's specialty     Patient has had an office visit with the authorizing provider or a provider within the authorizing providers department within the previous 12 mos or has a future within next 30 days. See \"Patient Info\" tab in inbasket, or \"Choose Columns\" in Meds & Orders section of the refill encounter.              Passed - Medication is active on med list        Passed - Patient is 18 years of age or older        pravastatin (PRAVACHOL) 80 MG tablet 90 tablet 3     Sig: Take 1 tablet (80 mg) by mouth daily       Statins Protocol Passed - 1/20/2020  9:03 AM        Passed - LDL on file in past 12 months     Recent Labs   Lab Test 01/09/20  0929   LDL 97             Passed - No abnormal creatine kinase in past 12 months     Recent Labs   Lab Test 07/17/19  0909                   Passed - Recent (12 mo) or future (30 days) visit within the authorizing provider's specialty     Patient has had an office visit with the authorizing provider or a provider within the authorizing providers department within the previous 12 mos or has a future within next 30 days. See \"Patient Info\" tab in inbasket, or \"Choose Columns\" in Meds & Orders section of the refill encounter.              Passed - Medication is active on med list        Passed - Patient is age 18 or older          "

## 2020-01-22 ENCOUNTER — OFFICE VISIT (OUTPATIENT)
Dept: NEUROLOGY | Facility: CLINIC | Age: 65
End: 2020-01-22
Payer: COMMERCIAL

## 2020-01-22 VITALS
HEART RATE: 75 BPM | WEIGHT: 192.6 LBS | BODY MASS INDEX: 26.86 KG/M2 | DIASTOLIC BLOOD PRESSURE: 70 MMHG | SYSTOLIC BLOOD PRESSURE: 115 MMHG

## 2020-01-22 DIAGNOSIS — G40.909 SEIZURE DISORDER (H): ICD-10-CM

## 2020-01-22 DIAGNOSIS — G40.119 PARTIAL EPILEPSY WITH INTRACTABLE EPILEPSY (H): Primary | ICD-10-CM

## 2020-01-22 DIAGNOSIS — G40.909 SEIZURE SYNDROME (H): ICD-10-CM

## 2020-01-22 RX ORDER — LEVETIRACETAM 1000 MG/1
TABLET ORAL
Qty: 270 TABLET | Refills: 3 | Status: SHIPPED | OUTPATIENT
Start: 2020-01-22 | End: 2020-11-19

## 2020-01-22 RX ORDER — CARBAMAZEPINE 400 MG/1
TABLET, EXTENDED RELEASE ORAL
Qty: 360 TABLET | Refills: 3 | Status: SHIPPED | OUTPATIENT
Start: 2020-01-22 | End: 2020-11-19

## 2020-01-22 ASSESSMENT — PAIN SCALES - GENERAL: PAINLEVEL: NO PAIN (0)

## 2020-01-22 NOTE — Clinical Note
"Please call patient and check if he needs additional support for employment, he is being \"fired\" due to medical issues. I have asked him to talk to a . Thanks. Carmen"

## 2020-01-22 NOTE — PATIENT INSTRUCTIONS
Talk to a  about your employment right with your neurological deficit and necessary work accommodations.   Read up on the American Disability Act (ADA)   Call [facility] to determine if they have a  that can speak to you about ADA  Please call [facility] billing to review cost and repayment  I ordered neuropsychology testing and ambulatory EEG       Carmen Patton MD
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
## 2020-01-22 NOTE — LETTER
2020       RE: Leighton Morales  : 1955   MRN: [id number]      To whom it may concern,     Mr. Almaguer had medication changes in 10/2018 which resulted in medication side effects (difficulty thinking and sleepiness). I am concerned his neurological deficits interfered with his work performance. We have modified his medications to reduce side effects that interfere with his job performance. He has been compliant with medical recommendations. 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 According to the American Disability Act employers must provide reasonable accommodations and act in good teri. I trust that [employer] understands the importance of this request and will cooperate with necessary medical recommendations. We appreicate your cooperation in Mr Almaguer work accommodations.     Sincerely,       Carmen Patton MD
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
## 2020-01-28 ENCOUNTER — TELEPHONE (OUTPATIENT)
Dept: NEUROLOGY | Facility: CLINIC | Age: 65
End: 2020-01-28

## 2020-01-28 NOTE — TELEPHONE ENCOUNTER
Received message from MD asking that patient be called to see if he needs additional support for his employment as he was told he would get fired due to his medical issues.    Placed call to patient's only phone left voice mail asking for a return call.

## 2020-02-19 ENCOUNTER — TELEPHONE (OUTPATIENT)
Dept: NEUROLOGY | Facility: CLINIC | Age: 65
End: 2020-02-19

## 2020-02-19 NOTE — TELEPHONE ENCOUNTER
"This nurse was asked to reach out to patient who is asking about a \"release\" and why it was sent to him.  Placed call to patient left voice mail with call back number and name, along with message that it did not appear that we had saent it.  Mentioned possibility that his employer had requested a recent letter be sent directly to them,  in which case we would need to have a Release of Information to do so.      Will try to reach patient again.  "

## 2020-02-20 ENCOUNTER — TELEPHONE (OUTPATIENT)
Dept: NEUROLOGY | Facility: CLINIC | Age: 65
End: 2020-02-20

## 2020-02-20 NOTE — TELEPHONE ENCOUNTER
"Placed second call to patient who answered, but stated he needed to step away and would call back shortly.    Leighton called back after a few minutes, uncertain what I was calling about; nurse explained that I had been requested to call back about a \"release\" possibly a JEISON form.  Patient was still unclear what the call was was about, but ask that if his difficulties become a leagl matter what would we need to release to the company or who would it need to be released to ( paul question).    Nurse indicated that if it became a legal issue that his  would indicate what was needed and would have him sign a JEISON to be sent to us for what ever they determined would be needed.    Patient was agreeable with this explanation, but worries aloud about not asking the right questions or having forgotten to ask about something.  Ask patient to call back if he thought of more questions.  "

## 2020-02-25 ENCOUNTER — OFFICE VISIT (OUTPATIENT)
Dept: NEUROLOGY | Facility: CLINIC | Age: 65
End: 2020-02-25
Payer: COMMERCIAL

## 2020-02-25 DIAGNOSIS — R41.842 VISUOSPATIAL DEFICIT: ICD-10-CM

## 2020-02-25 DIAGNOSIS — G40.919 EPILEPSY WITH ALTERED CONSCIOUSNESS WITH INTRACTABLE EPILEPSY (H): Primary | ICD-10-CM

## 2020-02-25 DIAGNOSIS — F09 MENTAL DISORDER DUE TO GENERAL MEDICAL CONDITION: ICD-10-CM

## 2020-02-25 NOTE — PROGRESS NOTES
Patient was seen for neuropsychological evaluation at the request of Dr. Carmen Patton, for the purposes of diagnostic clarification and treatment planning.  3 hrs 14 min of test administration and scoring were provided by this writer, Gretchen Singh.  Please see Dr. Porter Bains's report for a full interpretation of the findings.
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
## 2020-02-26 NOTE — PROGRESS NOTES
Name: Leighton Morales MRN: [id number]  : 1955  GORE: 2020  Staff: KWADWO Tech:  Age: 64  Sex: Male Hand: Left Educ: 16  Vision: 20/25 ?with correction / ?without correction    ORIENTATION     Time  -0     Place       Personal info         WAIS-IV     Raw SS     Similarities  22 8     Block Design  28 8     Matrix Reasoning 14 9     Digit Span  24 9      Arithmetic  13 9     Coding  34 5    AVLT      Trial 1 2 3 4 5 B 6             6 7 7 9 8 5 5      Raw %ile     Learning Over Trials (1-5) 37     Short Delay Recall  5 4-6     30  Recall   2 <2     30  Recognition Hits  10 6-7     30  False Positives  1     CARIDAD-O    Raw  T %ile     Copy    29.0     2-5     Short Delay Recall 8.0 31 3     Long Delay Recall 8.0 30 2     Recognition Total 18 37 10     Time to Copy  294        COWAT (FAS)   Raw: 25   T: 33  Animals   Raw:  9  T-score:  23    BOSTON NAMING TEST   Raw: 44   %ile <3    COMPLEX IDEATIONAL MATERIAL   Raw: 10/12 T: 32    TRAILS  Raw  Err %ile    A 51  1 4-8   B 174  0 4    STROOP Raw %ile   Word 64 4   Color  52 4-8       Color/Word  26 8    KEN   Raw: 18  %ile: 13    MCDOWELL FACIAL RECOGNITION   Raw: 36  Interp: Severe Impairment      GROOVED PEGBOARD    Raw  T Drops   RH  91  40 0   LH 97  36 0    BDI-II  Raw:  10  Interpretation: Minimal    PRITESH  Raw:  3  Interpretation: Minimal    WMS-III  Raw SS / Z     LM I  37 10     LM II  19 10     LM Recog. 25 Z: 0.20     Faces I 24 5     Faces II 27 6     Spatial Span 11 7    TOMM T1: 49  T2: Na T3: NA

## 2020-03-12 ENCOUNTER — OFFICE VISIT (OUTPATIENT)
Dept: NEUROLOGY | Facility: CLINIC | Age: 65
End: 2020-03-12
Payer: COMMERCIAL

## 2020-03-12 VITALS
BODY MASS INDEX: 27.25 KG/M2 | WEIGHT: 195.4 LBS | SYSTOLIC BLOOD PRESSURE: 105 MMHG | HEART RATE: 78 BPM | DIASTOLIC BLOOD PRESSURE: 59 MMHG

## 2020-03-12 DIAGNOSIS — R41.3 MEMORY LOSS: ICD-10-CM

## 2020-03-12 DIAGNOSIS — G40.919 EPILEPSY WITH ALTERED CONSCIOUSNESS WITH INTRACTABLE EPILEPSY (H): Primary | ICD-10-CM

## 2020-03-12 ASSESSMENT — PAIN SCALES - GENERAL: PAINLEVEL: NO PAIN (0)

## 2020-03-12 NOTE — PATIENT INSTRUCTIONS
Continue seizure medications   1.  Levetiracetam 1500 mg am and 1500 mg pm    2.  Carbamazepine  mg twice a day 400 mg in the morning, 800 mg at noon, 400 mg at night.         Consult with Dr. Galdamez (cognitive neurologist) to determine if you have frontotemporal dementia with REM sleep behavior disorder. Encourage Gi to come if possible.     MRI brain before visit with Dr. Galdamez     Please record his night time spell on video when they occur. At the onset ask Leighton to remember a word, follow a simple command (i.e point to the ceiling or point to the door). After the spell is completed ask him to recall the word and command that was given.     Keep spell diary     We will consider ambulatory EEG (48 hours EEG Wednesday, Thursday, Friday). I have placed an order mainly for billing quotes.     Check with billing and your insurance. If the ambulatory is too expensive we can hold on this. If you are concerned about cost, ask billing about foundation support for medical expenses.       Carmen Patton MD    BILLING NUMBER [phone]   Referral for Dr. Yoon
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
## 2020-03-16 DIAGNOSIS — R41.3 MEMORY LOSS: ICD-10-CM

## 2020-03-16 DIAGNOSIS — G40.919 EPILEPSY WITH ALTERED CONSCIOUSNESS WITH INTRACTABLE EPILEPSY (H): Primary | ICD-10-CM

## 2020-07-16 ENCOUNTER — VIRTUAL VISIT (OUTPATIENT)
Dept: NEUROLOGY | Facility: CLINIC | Age: 65
End: 2020-07-16
Payer: COMMERCIAL

## 2020-07-16 DIAGNOSIS — G40.919 EPILEPSY WITH ALTERED CONSCIOUSNESS WITH INTRACTABLE EPILEPSY (H): ICD-10-CM

## 2020-07-16 DIAGNOSIS — R41.3 MEMORY LOSS: Primary | ICD-10-CM

## 2020-07-16 NOTE — PATIENT INSTRUCTIONS
Continue seizure medications   1.  Levetiracetam 1500 mg am and 1500 mg pm    2.  Carbamazepine  mg twice a day        MRI brain ordered (1st)   Ambulatory EEG (2nd)  Consult with memory clinic (cognitive neurologist). Encourage Gi to come if possible.       Check with billing and your insurance. If the ambulatory is too expensive we can hold on this. If you are concerned about cost, ask billing about foundation support for medical expenses.     Please call [facility] billing to review cost and repayment, also foundation funds for medical bills     Follow up  3 months or after EEG    Carmen Patton MD     Preventive Care:    Colorectal Cancer Screening: During our visit today, we discussed that it is recommended you receive colorectal cancer screening. Please call or make an appointment with your primary care provider to discuss this. You may also call the [facility] scheduling line ([phone]) to set up a colonoscopy appointment.
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
## 2020-07-16 NOTE — PROGRESS NOTES
"Leighton Morales is a 64 year old male who is being evaluated via a billable video visit.      The patient has been notified of following:     \"This video visit will be conducted via a call between you and your physician/provider. We have found that certain health care needs can be provided without the need for an in-person physical exam.  This service lets us provide the care you need with a video conversation.  If a prescription is necessary we can send it directly to your pharmacy.  If lab work is needed we can place an order for that and you can then stop by our lab to have the test done at a later time.    Video visits are billed at different rates depending on your insurance coverage.  Please reach out to your insurance provider with any questions.    If during the course of the call the physician/provider feels a video visit is not appropriate, you will not be charged for this service.\"    Patient has given verbal consent for Video visit? Yes  How would you like to obtain your AVS? Mail a copy  If you are dropped from the video visit, the video invite should be resent to: Send to e-mail at: [email or web address]  Will anyone else be joining your video visit? No  {If patient encounters technical issues they should call [phone] :[id number]}      Video-Visit Details    Type of service:  Video Visit    Video Start Time: {video visit start/end time:152948}  Video End Time: {video visit start/end time:152948}    Originating Location (pt. Location): {video visit patient location:003558::\"Home\"}    Distant Location (provider location):  [facility] EPILEPSY CARE     Platform used for Video Visit: {Virtual Visit Platforms:658743::\"[employer]360\"}    {signature options:185498}        "
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
## 2020-08-14 DIAGNOSIS — N52.9 ERECTILE DYSFUNCTION, UNSPECIFIED ERECTILE DYSFUNCTION TYPE: ICD-10-CM

## 2020-08-18 RX ORDER — SILDENAFIL 100 MG/1
TABLET, FILM COATED ORAL
Qty: 18 TABLET | Refills: 5 | Status: SHIPPED | OUTPATIENT
Start: 2020-08-18 | End: 2022-04-25

## 2020-09-04 ENCOUNTER — TELEPHONE (OUTPATIENT)
Dept: NEUROLOGY | Facility: CLINIC | Age: 65
End: 2020-09-04

## 2020-11-10 ENCOUNTER — TELEPHONE (OUTPATIENT)
Dept: NEUROLOGY | Facility: CLINIC | Age: 65
End: 2020-11-10
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
## 2020-11-11 ENCOUNTER — OFFICE VISIT (OUTPATIENT)
Dept: NEUROLOGY | Facility: CLINIC | Age: 65
End: 2020-11-11
Payer: COMMERCIAL

## 2020-11-11 VITALS
HEIGHT: 71 IN | DIASTOLIC BLOOD PRESSURE: 72 MMHG | BODY MASS INDEX: 26.49 KG/M2 | SYSTOLIC BLOOD PRESSURE: 134 MMHG | TEMPERATURE: 97.1 F | WEIGHT: 189.2 LBS | HEART RATE: 77 BPM

## 2020-11-11 DIAGNOSIS — F32.A DEPRESSION, UNSPECIFIED DEPRESSION TYPE: ICD-10-CM

## 2020-11-11 DIAGNOSIS — G47.00 INSOMNIA, UNSPECIFIED TYPE: ICD-10-CM

## 2020-11-11 DIAGNOSIS — G47.52 DREAM ENACTMENT BEHAVIOR: ICD-10-CM

## 2020-11-11 DIAGNOSIS — F41.9 ANXIETY: ICD-10-CM

## 2020-11-11 DIAGNOSIS — R41.89 COGNITIVE DECLINE: Primary | ICD-10-CM

## 2020-11-11 ASSESSMENT — MIFFLIN-ST. JEOR: SCORE: 1665.34
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
## 2020-11-11 NOTE — PATIENT INSTRUCTIONS
You likely have dream enactment behavior, indicated the first stages of what later may become Parkinson's disease.    Your cognitive decline may be impacted by your lack of sleep. Part of your insomnia is likely related to anxiety, and starting a medication like Lexapro may be worthwhile to alleviate anxiety and help sleep. I will check with your epileptologists about whether this medication is safe given your other anti-epileptic medications.    Exercise for 30 minutes per day (aerobic exercise) for 5 days a week.    Check with your sleep medicine doctor about whether your CPAP settings are ideally optimized.    Get your thyroid and B12 levels checked at a [facility].    Melatonin can help improve symptoms of dream enactment.    Safety proof bedroom.
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
## 2020-11-19 ENCOUNTER — OFFICE VISIT (OUTPATIENT)
Dept: NEUROLOGY | Facility: CLINIC | Age: 65
End: 2020-11-19
Payer: COMMERCIAL

## 2020-11-19 VITALS
WEIGHT: 191.6 LBS | TEMPERATURE: 97.5 F | BODY MASS INDEX: 26.72 KG/M2 | HEART RATE: 76 BPM | DIASTOLIC BLOOD PRESSURE: 65 MMHG | SYSTOLIC BLOOD PRESSURE: 119 MMHG

## 2020-11-19 DIAGNOSIS — G40.119 PARTIAL EPILEPSY WITH INTRACTABLE EPILEPSY (H): ICD-10-CM

## 2020-11-19 DIAGNOSIS — G40.909 SEIZURE SYNDROME (H): ICD-10-CM

## 2020-11-19 DIAGNOSIS — G40.919 EPILEPSY WITH ALTERED CONSCIOUSNESS WITH INTRACTABLE EPILEPSY (H): Primary | ICD-10-CM

## 2020-11-19 DIAGNOSIS — G40.909 SEIZURE DISORDER (H): ICD-10-CM

## 2020-11-19 RX ORDER — LEVETIRACETAM 1000 MG/1
TABLET ORAL
Qty: 270 TABLET | Refills: 3 | Status: SHIPPED | OUTPATIENT
Start: 2020-11-19 | End: 2021-06-08

## 2020-11-19 RX ORDER — CARBAMAZEPINE 400 MG/1
TABLET, EXTENDED RELEASE ORAL
Qty: 360 TABLET | Refills: 3 | Status: SHIPPED | OUTPATIENT
Start: 2020-11-19 | End: 2021-06-08

## 2020-11-19 NOTE — PATIENT INSTRUCTIONS
Continue   1.  Levetiracetam 1500 mg am and 1500 mg pm    2.  Carbamazepine  mg twice a day 400 mg in the morning, 800 mg at noon, 400 mg at night.     2 day ambulatory     Phone call with Abdi after EEG    Follow up  1 year     Carmen Patton MD
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
## 2020-11-23 ENCOUNTER — ANCILLARY PROCEDURE (OUTPATIENT)
Dept: NEUROLOGY | Facility: CLINIC | Age: 65
End: 2020-11-23
Attending: PSYCHIATRY & NEUROLOGY
Payer: COMMERCIAL

## 2020-11-23 DIAGNOSIS — G40.919 EPILEPSY WITH ALTERED CONSCIOUSNESS WITH INTRACTABLE EPILEPSY (H): ICD-10-CM

## 2020-11-24 ENCOUNTER — ANCILLARY PROCEDURE (OUTPATIENT)
Dept: NEUROLOGY | Facility: CLINIC | Age: 65
End: 2020-11-24
Attending: PSYCHIATRY & NEUROLOGY
Payer: COMMERCIAL

## 2020-11-24 DIAGNOSIS — G40.919 EPILEPSY WITH ALTERED CONSCIOUSNESS WITH INTRACTABLE EPILEPSY (H): ICD-10-CM

## 2020-11-24 LAB
BASOPHILS # BLD AUTO: 0 10E9/L (ref 0–0.2)
BASOPHILS NFR BLD AUTO: 0.8 %
DIFFERENTIAL METHOD BLD: NORMAL
EOSINOPHIL # BLD AUTO: 0.1 10E9/L (ref 0–0.7)
EOSINOPHIL NFR BLD AUTO: 1.6 %
ERYTHROCYTE [DISTWIDTH] IN BLOOD BY AUTOMATED COUNT: 12.9 % (ref 10–15)
HCT VFR BLD AUTO: 41 % (ref 40–53)
HGB BLD-MCNC: 14.1 G/DL (ref 13.3–17.7)
LYMPHOCYTES # BLD AUTO: 1 10E9/L (ref 0.8–5.3)
LYMPHOCYTES NFR BLD AUTO: 18.8 %
MCH RBC QN AUTO: 31.3 PG (ref 26.5–33)
MCHC RBC AUTO-ENTMCNC: 34.4 G/DL (ref 31.5–36.5)
MCV RBC AUTO: 91 FL (ref 78–100)
MONOCYTES # BLD AUTO: 0.5 10E9/L (ref 0–1.3)
MONOCYTES NFR BLD AUTO: 9.3 %
NEUTROPHILS # BLD AUTO: 3.6 10E9/L (ref 1.6–8.3)
NEUTROPHILS NFR BLD AUTO: 69.5 %
PLATELET # BLD AUTO: 166 10E9/L (ref 150–450)
RBC # BLD AUTO: 4.5 10E12/L (ref 4.4–5.9)
WBC # BLD AUTO: 5.2 10E9/L (ref 4–11)

## 2020-11-24 PROCEDURE — 99000 SPECIMEN HANDLING OFFICE-LAB: CPT | Performed by: PSYCHIATRY & NEUROLOGY

## 2020-11-24 PROCEDURE — 80157 ASSAY CARBAMAZEPINE FREE: CPT | Mod: 90 | Performed by: PSYCHIATRY & NEUROLOGY

## 2020-11-24 PROCEDURE — 84450 TRANSFERASE (AST) (SGOT): CPT | Performed by: PSYCHIATRY & NEUROLOGY

## 2020-11-24 PROCEDURE — 80177 DRUG SCRN QUAN LEVETIRACETAM: CPT | Mod: 90 | Performed by: PSYCHIATRY & NEUROLOGY

## 2020-11-24 PROCEDURE — 80156 ASSAY CARBAMAZEPINE TOTAL: CPT | Mod: 90 | Performed by: PSYCHIATRY & NEUROLOGY

## 2020-11-24 PROCEDURE — 85025 COMPLETE CBC W/AUTO DIFF WBC: CPT | Performed by: PSYCHIATRY & NEUROLOGY

## 2020-11-24 PROCEDURE — 84460 ALANINE AMINO (ALT) (SGPT): CPT | Performed by: PSYCHIATRY & NEUROLOGY

## 2020-11-24 PROCEDURE — 36415 COLL VENOUS BLD VENIPUNCTURE: CPT | Performed by: PSYCHIATRY & NEUROLOGY

## 2020-11-25 ENCOUNTER — ANCILLARY PROCEDURE (OUTPATIENT)
Dept: NEUROLOGY | Facility: CLINIC | Age: 65
End: 2020-11-25
Attending: PSYCHIATRY & NEUROLOGY
Payer: COMMERCIAL

## 2020-11-25 LAB
ALT SERPL W P-5'-P-CCNC: 33 U/L (ref 0–70)
AST SERPL W P-5'-P-CCNC: 16 U/L (ref 0–45)
LEVETIRACETAM SERPL-MCNC: 38 UG/ML (ref 12–46)

## 2020-11-25 RX ORDER — ESCITALOPRAM OXALATE 10 MG/1
10 TABLET ORAL AT BEDTIME
Qty: 90 TABLET | Refills: 3 | Status: SHIPPED | OUTPATIENT
Start: 2020-11-25 | End: 2022-04-25

## 2020-11-27 LAB
CARBAMAZEPINE EP FREE SERPL-MCNC: 1 UG/ML (ref 0.2–2)
CARBAMAZEPINE EP SERPL-MCNC: 2.3 UG/ML (ref 0.4–4)
CARBAMAZEPINE FREE SERPL-MCNC: 2.7 UG/ML (ref 0.6–4.2)
CARBAMAZEPINE SERPL-MCNC: 12.5 UG/ML (ref 4–12)

## 2021-01-21 ENCOUNTER — OFFICE VISIT (OUTPATIENT)
Dept: NEUROLOGY | Facility: CLINIC | Age: 66
End: 2021-01-21
Payer: COMMERCIAL

## 2021-01-21 VITALS
HEART RATE: 77 BPM | WEIGHT: 189.8 LBS | BODY MASS INDEX: 26.47 KG/M2 | SYSTOLIC BLOOD PRESSURE: 148 MMHG | TEMPERATURE: 97.3 F | DIASTOLIC BLOOD PRESSURE: 87 MMHG

## 2021-01-21 DIAGNOSIS — R56.9 CONVULSIONS, UNSPECIFIED CONVULSION TYPE (H): Primary | ICD-10-CM
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
## 2021-01-22 ENCOUNTER — OFFICE VISIT (OUTPATIENT)
Dept: INTERNAL MEDICINE | Facility: CLINIC | Age: 66
End: 2021-01-22
Payer: COMMERCIAL

## 2021-01-22 VITALS
OXYGEN SATURATION: 98 % | TEMPERATURE: 97.2 F | BODY MASS INDEX: 26.18 KG/M2 | DIASTOLIC BLOOD PRESSURE: 64 MMHG | SYSTOLIC BLOOD PRESSURE: 118 MMHG | HEIGHT: 71 IN | RESPIRATION RATE: 16 BRPM | HEART RATE: 77 BPM | WEIGHT: 187 LBS

## 2021-01-22 DIAGNOSIS — Z23 NEED FOR PROPHYLACTIC VACCINATION AGAINST STREPTOCOCCUS PNEUMONIAE (PNEUMOCOCCUS): ICD-10-CM

## 2021-01-22 DIAGNOSIS — E78.2 MIXED HYPERLIPIDEMIA: ICD-10-CM

## 2021-01-22 DIAGNOSIS — R56.9 CONVULSIONS, UNSPECIFIED CONVULSION TYPE (H): ICD-10-CM

## 2021-01-22 DIAGNOSIS — F41.9 ANXIETY: ICD-10-CM

## 2021-01-22 DIAGNOSIS — N40.1 BENIGN PROSTATIC HYPERPLASIA WITH LOWER URINARY TRACT SYMPTOMS, SYMPTOM DETAILS UNSPECIFIED: ICD-10-CM

## 2021-01-22 DIAGNOSIS — Z00.00 WELCOME TO MEDICARE PREVENTIVE VISIT: Primary | ICD-10-CM

## 2021-01-22 LAB
ALBUMIN SERPL-MCNC: 3.6 G/DL (ref 3.4–5)
ALP SERPL-CCNC: 49 U/L (ref 40–150)
ALT SERPL W P-5'-P-CCNC: 29 U/L (ref 0–70)
ANION GAP SERPL CALCULATED.3IONS-SCNC: 5 MMOL/L (ref 3–14)
AST SERPL W P-5'-P-CCNC: 25 U/L (ref 0–45)
BILIRUB SERPL-MCNC: 0.3 MG/DL (ref 0.2–1.3)
BUN SERPL-MCNC: 11 MG/DL (ref 7–30)
CALCIUM SERPL-MCNC: 8.8 MG/DL (ref 8.5–10.1)
CHLORIDE SERPL-SCNC: 106 MMOL/L (ref 94–109)
CHOLEST SERPL-MCNC: 215 MG/DL
CO2 SERPL-SCNC: 28 MMOL/L (ref 20–32)
CREAT SERPL-MCNC: 0.99 MG/DL (ref 0.66–1.25)
GFR SERPL CREATININE-BSD FRML MDRD: 79 ML/MIN/{1.73_M2}
GLUCOSE SERPL-MCNC: 78 MG/DL (ref 70–99)
HDLC SERPL-MCNC: 83 MG/DL
LDLC SERPL CALC-MCNC: 108 MG/DL
NONHDLC SERPL-MCNC: 132 MG/DL
POTASSIUM SERPL-SCNC: 3.6 MMOL/L (ref 3.4–5.3)
PROT SERPL-MCNC: 7.4 G/DL (ref 6.8–8.8)
SODIUM SERPL-SCNC: 139 MMOL/L (ref 133–144)
TRIGL SERPL-MCNC: 120 MG/DL

## 2021-01-22 PROCEDURE — G0402 INITIAL PREVENTIVE EXAM: HCPCS | Performed by: INTERNAL MEDICINE

## 2021-01-22 PROCEDURE — 90732 PPSV23 VACC 2 YRS+ SUBQ/IM: CPT | Performed by: INTERNAL MEDICINE

## 2021-01-22 PROCEDURE — 99214 OFFICE O/P EST MOD 30 MIN: CPT | Mod: 25 | Performed by: INTERNAL MEDICINE

## 2021-01-22 PROCEDURE — 36415 COLL VENOUS BLD VENIPUNCTURE: CPT | Performed by: INTERNAL MEDICINE

## 2021-01-22 PROCEDURE — 80061 LIPID PANEL: CPT | Performed by: INTERNAL MEDICINE

## 2021-01-22 PROCEDURE — 80053 COMPREHEN METABOLIC PANEL: CPT | Performed by: INTERNAL MEDICINE

## 2021-01-22 PROCEDURE — G0009 ADMIN PNEUMOCOCCAL VACCINE: HCPCS | Performed by: INTERNAL MEDICINE

## 2021-01-22 RX ORDER — TAMSULOSIN HYDROCHLORIDE 0.4 MG/1
0.8 CAPSULE ORAL DAILY
Qty: 180 CAPSULE | Refills: 3 | Status: SHIPPED | OUTPATIENT
Start: 2021-01-22 | End: 2022-03-22

## 2021-01-22 RX ORDER — PRAVASTATIN SODIUM 80 MG/1
80 TABLET ORAL DAILY
Qty: 90 TABLET | Refills: 3 | Status: SHIPPED | OUTPATIENT
Start: 2021-01-22 | End: 2022-03-22

## 2021-01-22 ASSESSMENT — MIFFLIN-ST. JEOR: SCORE: 1655.36

## 2021-01-22 ASSESSMENT — PATIENT HEALTH QUESTIONNAIRE - PHQ9: SUM OF ALL RESPONSES TO PHQ QUESTIONS 1-9: 1

## 2021-01-22 ASSESSMENT — ACTIVITIES OF DAILY LIVING (ADL): CURRENT_FUNCTION: NO ASSISTANCE NEEDED

## 2021-01-22 NOTE — PATIENT INSTRUCTIONS
Continue current meds  Prescriptions refilled.    Labs today as ordered  Schedule an eye appointment  Vaccinations: Pneumococcal 23 vaccine  Pt was informed regarding extra E&M billing for management of new or established medical issues not related to today's wellness visit  Would recommend you receive a coronavirus/COVID-19 vaccination when it becomes available

## 2021-01-22 NOTE — PROGRESS NOTES
"SUBJECTIVE:   Leighton Morales is a 65 year old male who presents for Preventive Visit.      Are you in the first 12 months of your Medicare coverage?  Yes,  Visual Acuity:  Right Eye: 20/25   Left Eye: 20/25  Both Eyes: 20/25-With Corrective Lenses    Healthy Habits:     In general, how would you rate your overall health?  Good    Frequency of exercise:  2-3 days/week    Duration of exercise:  45-60 minutes    Do you usually eat at least 4 servings of fruit and vegetables a day, include whole grains    & fiber and avoid regularly eating high fat or \"junk\" foods?  No    Taking medications regularly:  No    Barriers to taking medications:  None    Medication side effects:  None    Ability to successfully perform activities of daily living:  No assistance needed    Home Safety:  No safety concerns identified    Hearing Impairment:  No hearing concerns    In the past 6 months, have you been bothered by leaking of urine?  No    In general, how would you rate your overall mental or emotional health?  Good      PHQ-2 Total Score: 0    Additional concerns today:  No    Do you feel safe in your environment? Yes    Have you ever done Advance Care Planning? (For example, a Health Directive, POLST, or a discussion with a medical provider or your loved ones about your wishes): Yes, patient states has an Advance Care Planning document and will bring a copy to the clinic.     Fall risk  Fallen 2 or more times in the past year?: No  Any fall with injury in the past year?: No    Cognitive Screening   1) Repeat 3 items (Leader, Season, Table)    2) Clock draw: NORMAL  3) 3 item recall: Recalls 3 objects  Results: 3 items recalled: COGNITIVE IMPAIRMENT LESS LIKELY      Mini-CogTM Copyright ASHOK Araiza. Licensed by the author for use in [facility]; reprinted with permission (devon@.[facility]). All rights reserved.      Do you have sleep apnea, excessive snoring or daytime drowsiness?: yes-Patient has Cpap    Reviewed and updated as " needed this visit by clinical staff   Allergies               Reviewed and updated as needed this visit by Provider                Social History     Tobacco Use     Smoking status: Former Smoker     Packs/day: 0.50     Quit date: 1978     Years since quittin.4     Smokeless tobacco: Never Used   Substance Use Topics     Alcohol use: Yes     Comment:  1-2 beers  per week     If you drink alcohol do you typically have >3 drinks per day or >7 drinks per week? No    Alcohol Use 2021   Prescreen: >3 drinks/day or >7 drinks/week? No   Prescreen: >3 drinks/day or >7 drinks/week? -       Current providers sharing in care for this patient include:   Patient Care Team:  Los Mcelroy MD as PCP - General (Internal Medicine)  Cherelle Kamara MD as MD (Neurology)  Los Mcelroy MD as Assigned PCP  Carmen Patton MD as Assigned Neuroscience Provider    The following health maintenance items are reviewed in Epic and correct as of today:  Health Maintenance   Topic Date Due     DEPRESSION ACTION PLAN  1955     PHQ-9  1955     AORTIC ANEURYSM SCREENING (SYSTEM ASSIGNED)  10/24/2020     Pneumococcal Vaccine: 65+ Years (1 of 1 - PPSV23) 10/24/2020     MEDICARE ANNUAL WELLNESS VISIT  2021     FALL RISK ASSESSMENT  2021     DTAP/TDAP/TD IMMUNIZATION (3 - Td) 10/29/2023     LIPID  2025     ADVANCE CARE PLANNING  2025     COLORECTAL CANCER SCREENING  10/05/2030     HEPATITIS C SCREENING  Completed     HIV SCREENING  Completed     INFLUENZA VACCINE  Completed     ZOSTER IMMUNIZATION  Completed     Pneumococcal Vaccine: Pediatrics (0 to 5 Years) and At-Risk Patients (6 to 64 Years)  Aged Out     IPV IMMUNIZATION  Aged Out     MENINGITIS IMMUNIZATION  Aged Out     HEPATITIS B IMMUNIZATION  Aged Out     Labs reviewed in EPIC      Review of Systems  CONSTITUTIONAL: NEGATIVE for fever, chills,. Weight down 8 pounds  INTEGUMENTARY/SKIN: NEGATIVE for worrisome rashes, moles or  "lesions  EYES: NEGATIVE for vision changes or irritation. Has glasses. Last eye exam 2-3 years ago  ENT/MOUTH: NEGATIVE for ear, mouth and throat problems  RESP: NEGATIVE for significant cough or SOB. Using CPAP  CV: NEGATIVE for chest pain, palpitations or peripheral edema  GI: NEGATIVE for nausea, abdominal pain, heartburn, or change in bowel habits. Had colonoscopy late last year per pt but no report received. States was \"normal\"  : NEGATIVE for frequency, dysuria, or hematuria. Urine flow better with Flomax. Rarely using Viagra now  MUSCULOSKELETAL: NEGATIVE for significant arthralgias or myalgia  NEURO: NEGATIVE for weakness, dizziness or paresthesias. On sz meds and no recurrence recently. Followed by Neurology.  Recently  neg EEG  ENDOCRINE: NEGATIVE for temperature intolerance. On statin for lipids  HEME: NEGATIVE for bleeding problems  PSYCHIATRIC:  POSITIVE for hx anxiety/depression. On Lexapro and mood doing well.  Pt had lost his job last year and initially quite stressfull; but states he is fine financially and enjoying not working now and not seeking other work at this time    OBJECTIVE:   /64   Pulse 77   Temp 97.2  F (36.2  C) (Temporal)   Resp 16   Ht 1.803 m (5' 11\")   Wt 84.8 kg (187 lb)   SpO2 98%   BMI 26.08 kg/m   Estimated body mass index is 26.47 kg/m  as calculated from the following:    Height as of 11/11/20: 1.803 m (5' 11\").    Weight as of 1/21/21: 86.1 kg (189 lb 12.8 oz).  Physical Exam  General appearance - healthy, alert, no distress  Skin - No rashes or lesions.  Head - normocephalic, atraumatic  Eyes - LIZ, EOMI, fundi exam with nondilated pupils negative.  Ears - External ears normal. Canals clear. TM's normal.  Nose/Sinuses - Nares normal. Septum midline. Mucosa normal. No drainage or sinus tenderness.  Oropharynx - No erythema, no adenopathy, no exudates.  Neck - Supple without adenopathy or thyromegaly. No bruits.  Lungs - Clear to auscultation without " wheezes/rhonchi.  Heart - Regular rate and rhythm without murmurs, clicks, or gallops.  Nodes - No supraclavicular, axillary, or inguinal adenopathy palpable.  Abdomen - Abdomen soft, non-tender. BS normal. No masses or hepatosplenomegaly palpable. No bruits.  Extremities -No cyanosis, clubbing or edema.    Musculoskeletal - Spine ROM normal. Muscular strength intact.   Peripheral pulses - radial=4/4, femoral=4/4, posterior tibial=4/4, dorsalis pedis=4/4,  Neuro - Gait normal. Reflexes normal and symmetric. Sensation grossly WNL.  Genital - Normal-appearing male external genitalia. No scrotal masses or inguinal hernia palpable.   Rectal - Guaic negative stool. Normal tone. Prostate 1 plus in size to palpation. No rectal masses or prostate nodularity palpable      ASSESSMENT / PLAN:   1. Welcome to Medicare preventive visit  Patient states he had a colonoscopy done last fall with [facility] that was reportedly normal.  Will get report so can be scanned in the chart    2. Mixed hyperlipidemia  Previously controlled.  Continue statin therapy.  Labs today as ordered for follow-up  - pravastatin (PRAVACHOL) 80 MG tablet; Take 1 tablet (80 mg) by mouth daily  Dispense: 90 tablet; Refill: 3  - Lipid panel reflex to direct LDL Fasting  - Comprehensive metabolic panel    3. Benign prostatic hyperplasia with lower urinary tract symptoms, symptom details unspecified  Controlled.  Continue current medication  - tamsulosin (FLOMAX) 0.4 MG capsule; Take 2 capsules (0.8 mg) by mouth daily  Dispense: 180 capsule; Refill: 3    4. Need for prophylactic vaccination against Streptococcus pneumoniae (pneumococcus)  - Pneumococcal vaccine 23 valent PPSV23  (Pneumovax) [51818]  - ADMIN MEDICARE: Pneumococcal Vaccine ()    5. Convulsions, unspecified convulsion type (H)  Controlled.  Continue management per neurology    6. Anxiety  Symptoms well controlled currently.  Continue Lexapro per neurology      Patient has been advised of split  "billing requirements and indicates understanding: Yes       COUNSELING:  Reviewed preventive health counseling, as reflected in patient instructions    Estimated body mass index is 26.47 kg/m  as calculated from the following:    Height as of 11/11/20: 1.803 m (5' 11\").    Weight as of 1/21/21: 86.1 kg (189 lb 12.8 oz).        He reports that he quit smoking about 42 years ago. He smoked 0.50 packs per day. He has never used smokeless tobacco.      Appropriate preventive services were discussed with this patient, including applicable screening as appropriate for cardiovascular disease, diabetes, osteopenia/osteoporosis, and glaucoma.  As appropriate for age/gender, discussed screening for colorectal cancer, prostate cancer, breast cancer, and cervical cancer. Checklist reviewing preventive services available has been given to the patient.    Reviewed patients plan of care and provided an AVS. The Basic Care Plan (routine screening as documented in Health Maintenance) for Leighton meets the Care Plan requirement. This Care Plan has been established and reviewed with the Patient.    Counseling Resources:  ATP IV Guidelines  Pooled Cohorts Equation Calculator  Breast Cancer Risk Calculator  Breast Cancer: Medication to Reduce Risk  FRAX Risk Assessment  ICSI Preventive Guidelines  Dietary Guidelines for Americans, 2010  [employer]'s MyPlate  ASA Prophylaxis  Lung CA Screening      PLAN:  Continue current meds  Prescriptions refilled.    Labs today as ordered  Schedule an eye appointment in the next 1 year  Vaccinations: Pneumococcal 23 vaccine  Pt was informed regarding extra E&M billing for management of new or established medical issues not related to today's wellness visit  Would recommend you receive a coronavirus/COVID-19 vaccination when it becomes available    Los Mcelroy MD  [facility]       "

## 2021-04-18 DIAGNOSIS — E78.2 MIXED HYPERLIPIDEMIA: Primary | ICD-10-CM

## 2021-06-07 DIAGNOSIS — G40.909 SEIZURE SYNDROME (H): ICD-10-CM

## 2021-06-07 DIAGNOSIS — G40.119 PARTIAL EPILEPSY WITH INTRACTABLE EPILEPSY (H): ICD-10-CM

## 2021-06-07 DIAGNOSIS — G40.909 SEIZURE DISORDER (H): ICD-10-CM

## 2021-06-07 NOTE — TELEPHONE ENCOUNTER
Pt has 7 days of Carbamazepine and plenty of Levitracetam, but needs [facility] be [facility].

## 2021-06-08 RX ORDER — LEVETIRACETAM 1000 MG/1
TABLET ORAL
Qty: 270 TABLET | Refills: 1 | Status: SHIPPED | OUTPATIENT
Start: 2021-06-08 | End: 2021-12-23

## 2021-06-08 RX ORDER — CARBAMAZEPINE 400 MG/1
TABLET, EXTENDED RELEASE ORAL
Qty: 360 TABLET | Refills: 1 | Status: SHIPPED | OUTPATIENT
Start: 2021-06-08 | End: 2021-12-23

## 2021-06-08 NOTE — TELEPHONE ENCOUNTER
Medication Refill request received for   Pending Prescriptions:                       Disp   Refills    carBAMazepine (TEGRETOL XR) 400 MG 12 hr *360 ta*3            Si TABLET TWICE A DAY ( 800 mg twice a day)    levETIRAcetam (KEPPRA) 1000 MG tablet     270 ta*3            Sig: TAKE 1.5 TABLETS TWICE A DAY      Last Written Prescription Date:  2020  Length of last prescription in time: a one year supply was provided for both  Last Office Visit : 2021  Future Office visit:  Due 22    [facility] Medication Refill Protocol requirements:      Refill request was approved per [facility]ication Refill Protocol requirements. Prescriptions were transferred from [facility] to [facility].

## 2021-12-20 DIAGNOSIS — G40.909 SEIZURE SYNDROME (H): ICD-10-CM

## 2021-12-20 DIAGNOSIS — G40.909 SEIZURE DISORDER (H): ICD-10-CM

## 2021-12-20 DIAGNOSIS — G40.119 PARTIAL EPILEPSY WITH INTRACTABLE EPILEPSY (H): ICD-10-CM

## 2021-12-23 RX ORDER — CARBAMAZEPINE 400 MG/1
800 TABLET, EXTENDED RELEASE ORAL 2 TIMES DAILY
Qty: 360 TABLET | Refills: 0 | Status: SHIPPED | OUTPATIENT
Start: 2021-12-23 | End: 2022-03-07

## 2021-12-23 RX ORDER — LEVETIRACETAM 1000 MG/1
1500 TABLET ORAL 2 TIMES DAILY
Qty: 270 TABLET | Refills: 0 | Status: SHIPPED | OUTPATIENT
Start: 2021-12-23 | End: 2022-03-07

## 2021-12-23 NOTE — TELEPHONE ENCOUNTER
LEVETIRACETAM TABS 1000MG  Last Written Prescription Date:  6/8/2021  Last Fill Quantity: 270,   # refills: 1  Last Office Visit : 1/21/2021  Future Office visit:  None  270 Tabs sent to pharm 12/23/2021    CARBAMAZEPINE ER TABS 400MG  Last Written Prescription Date:  6/8/2021  Last Fill Quantity: 360,   # refills: 1  Last Office Visit : 1/21/2021  Future Office visit:  None  360 Tabs  sent to pharm 12/23/2021    Misty Richmond RN  Central Triage Red Flags/Med Refills

## 2022-02-14 ENCOUNTER — TRANSFERRED RECORDS (OUTPATIENT)
Dept: HEALTH INFORMATION MANAGEMENT | Facility: CLINIC | Age: 67
End: 2022-02-14
Payer: COMMERCIAL

## 2022-03-07 ENCOUNTER — OFFICE VISIT (OUTPATIENT)
Dept: NEUROLOGY | Facility: CLINIC | Age: 67
End: 2022-03-07
Payer: COMMERCIAL

## 2022-03-07 VITALS — SYSTOLIC BLOOD PRESSURE: 137 MMHG | TEMPERATURE: 97.3 F | DIASTOLIC BLOOD PRESSURE: 86 MMHG | HEART RATE: 82 BPM

## 2022-03-07 DIAGNOSIS — G40.909 SEIZURE DISORDER (H): ICD-10-CM

## 2022-03-07 DIAGNOSIS — R40.4 NONSPECIFIC PAROXYSMAL SPELL: Primary | ICD-10-CM

## 2022-03-07 DIAGNOSIS — G40.909 SEIZURE SYNDROME (H): ICD-10-CM

## 2022-03-07 DIAGNOSIS — R42 VERTIGO: ICD-10-CM

## 2022-03-07 DIAGNOSIS — G40.119 PARTIAL EPILEPSY WITH INTRACTABLE EPILEPSY (H): ICD-10-CM

## 2022-03-07 RX ORDER — MECLIZINE HYDROCHLORIDE 25 MG/1
25 TABLET ORAL 3 TIMES DAILY PRN
Qty: 30 TABLET | Refills: 1 | Status: SHIPPED | OUTPATIENT
Start: 2022-03-07 | End: 2023-11-06

## 2022-03-07 RX ORDER — CARBAMAZEPINE 400 MG/1
800 TABLET, EXTENDED RELEASE ORAL 2 TIMES DAILY
Qty: 360 TABLET | Refills: 3 | Status: ON HOLD | OUTPATIENT
Start: 2022-03-07 | End: 2022-05-11

## 2022-03-07 RX ORDER — LEVETIRACETAM 1000 MG/1
1500 TABLET ORAL 2 TIMES DAILY
Qty: 270 TABLET | Refills: 3 | Status: SHIPPED | OUTPATIENT
Start: 2022-03-07 | End: 2023-03-04

## 2022-03-07 RX ORDER — MECLIZINE HYDROCHLORIDE 25 MG/1
25 TABLET ORAL 3 TIMES DAILY PRN
Qty: 30 TABLET | Refills: 1 | Status: SHIPPED | OUTPATIENT
Start: 2022-03-07 | End: 2022-03-07

## 2022-03-07 ASSESSMENT — PAIN SCALES - GENERAL: PAINLEVEL: NO PAIN (0)

## 2022-03-07 NOTE — PATIENT INSTRUCTIONS
MRI BRAIN   MR ANGIOGRAM OF BRAIN AND NECK   See primary care provider for vertigo workup - please complete cardiac evaluation and ask primary care provider about aspirin   After MRI imaging and primary care provider consultation consider physical therapist for vestibular rehab epley maneuver   Take meclizine 25 mg ever 8 hour as need for vertigo attacks  Check carbamazepine and levetiracetam lab     Please email [facility] on mychart about carbamazepine dosing     Continue   Levetiracetam 1500 mg am and 1500 mg pm    Carbamazepine  mg twice a day       Follow up  3 months     Carmen Patton MD
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
## 2022-03-10 ENCOUNTER — HOSPITAL ENCOUNTER (OUTPATIENT)
Dept: PHYSICAL THERAPY | Facility: CLINIC | Age: 67
Setting detail: THERAPIES SERIES
Discharge: HOME | End: 2022-03-10
Attending: PSYCHIATRY & NEUROLOGY
Payer: COMMERCIAL

## 2022-03-10 DIAGNOSIS — R40.4 NONSPECIFIC PAROXYSMAL SPELL: ICD-10-CM

## 2022-03-10 DIAGNOSIS — R42 VERTIGO: ICD-10-CM

## 2022-03-10 PROCEDURE — 97161 PT EVAL LOW COMPLEX 20 MIN: CPT | Mod: GP | Performed by: PHYSICAL THERAPIST

## 2022-03-18 ENCOUNTER — HOSPITAL ENCOUNTER (OUTPATIENT)
Dept: MRI IMAGING | Facility: CLINIC | Age: 67
Discharge: HOME | End: 2022-03-18
Attending: PSYCHIATRY & NEUROLOGY | Admitting: PSYCHIATRY & NEUROLOGY
Payer: COMMERCIAL

## 2022-03-18 DIAGNOSIS — G40.119 PARTIAL EPILEPSY WITH INTRACTABLE EPILEPSY (H): ICD-10-CM

## 2022-03-18 DIAGNOSIS — R40.4 NONSPECIFIC PAROXYSMAL SPELL: ICD-10-CM

## 2022-03-18 DIAGNOSIS — R42 VERTIGO: ICD-10-CM

## 2022-03-18 PROCEDURE — 255N000002 HC RX 255 OP 636: Performed by: PSYCHIATRY & NEUROLOGY

## 2022-03-18 PROCEDURE — 70549 MR ANGIOGRAPH NECK W/O&W/DYE: CPT

## 2022-03-18 PROCEDURE — 70553 MRI BRAIN STEM W/O & W/DYE: CPT

## 2022-03-18 PROCEDURE — 70544 MR ANGIOGRAPHY HEAD W/O DYE: CPT | Mod: XS

## 2022-03-18 PROCEDURE — A9585 GADOBUTROL INJECTION: HCPCS | Performed by: PSYCHIATRY & NEUROLOGY

## 2022-03-18 RX ORDER — GADOBUTROL 604.72 MG/ML
10 INJECTION INTRAVENOUS ONCE
Status: COMPLETED | OUTPATIENT
Start: 2022-03-18 | End: 2022-03-18

## 2022-03-18 RX ADMIN — GADOBUTROL 10 ML: 604.72 INJECTION INTRAVENOUS at 13:18

## 2022-03-20 DIAGNOSIS — E78.5 HYPERLIPIDEMIA LDL GOAL <100: ICD-10-CM

## 2022-03-20 DIAGNOSIS — E78.2 MIXED HYPERLIPIDEMIA: ICD-10-CM

## 2022-03-20 DIAGNOSIS — Z12.5 SCREENING FOR PROSTATE CANCER: Primary | ICD-10-CM

## 2022-03-20 DIAGNOSIS — N40.1 BENIGN PROSTATIC HYPERPLASIA WITH LOWER URINARY TRACT SYMPTOMS, SYMPTOM DETAILS UNSPECIFIED: ICD-10-CM

## 2022-03-22 ENCOUNTER — TELEPHONE (OUTPATIENT)
Dept: NEUROLOGY | Facility: CLINIC | Age: 67
End: 2022-03-22
Payer: COMMERCIAL

## 2022-03-22 RX ORDER — PRAVASTATIN SODIUM 80 MG/1
TABLET ORAL
Qty: 90 TABLET | Refills: 0 | Status: SHIPPED | OUTPATIENT
Start: 2022-03-22 | End: 2022-04-25 | Stop reason: ALTCHOICE

## 2022-03-22 RX ORDER — TAMSULOSIN HYDROCHLORIDE 0.4 MG/1
CAPSULE ORAL
Qty: 180 CAPSULE | Refills: 0 | Status: SHIPPED | OUTPATIENT
Start: 2022-03-22 | End: 2022-04-25

## 2022-03-22 NOTE — TELEPHONE ENCOUNTER
What is the concern that needs to be addressed by a nurse? Pt would like a call back to discuss test results, does not want to join [email or web address]. Please call back.     May a detailed message be left on [employer]? yes    Date of last office visit: 3/7/22    Message routed to: mincep rn pool

## 2022-03-22 NOTE — TELEPHONE ENCOUNTER
Routing refill request to provider for review/approval because:  Labs out of range:    LDL Cholesterol Calculated   Date Value Ref Range Status   01/22/2021 108 (H) <100 mg/dL Final     Comment:     Above desirable:  100-129 mg/dl  Borderline High:  130-159 mg/dL  High:             160-189 mg/dL  Very high:       >189 mg/dl        Patient does not have Tadalafil, Vardenafil, or Sildenafil on their medication list    Patient has upcoming appt 4/25/22  Maria Esther Woodard RN

## 2022-03-22 NOTE — TELEPHONE ENCOUNTER
Call patient.  Has appointment to see me on 4/25/2022.  Needs fasting labs done sometime between now that appointment so can review results with patient when he is seen.  Assist patient with scheduling fasting lab appointment.  Medications refilled for 90 days

## 2022-03-24 NOTE — TELEPHONE ENCOUNTER
Patient was notified of Dr. Patton's impression of the imaging results.     Carmen Patton MD Vassar, Allison, RN  Caller: Unspecified (2 days ago, 11:32 AM)  NO NEW changes and MRA is normal. He has post surgery changes.       Patient asks for a letter with the MRI report sent to his home.
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
## 2022-03-30 ENCOUNTER — LAB (OUTPATIENT)
Dept: LAB | Facility: CLINIC | Age: 67
End: 2022-03-30
Payer: COMMERCIAL

## 2022-03-30 DIAGNOSIS — R42 VERTIGO: ICD-10-CM

## 2022-03-30 DIAGNOSIS — R40.4 NONSPECIFIC PAROXYSMAL SPELL: ICD-10-CM

## 2022-03-30 DIAGNOSIS — Z12.5 SCREENING FOR PROSTATE CANCER: ICD-10-CM

## 2022-03-30 DIAGNOSIS — E78.2 MIXED HYPERLIPIDEMIA: ICD-10-CM

## 2022-03-30 DIAGNOSIS — G40.119 PARTIAL EPILEPSY WITH INTRACTABLE EPILEPSY (H): ICD-10-CM

## 2022-03-30 PROCEDURE — 80161 ASY CARBAMAZEPIN 10,11-EPXID: CPT | Mod: 90

## 2022-03-30 PROCEDURE — 80061 LIPID PANEL: CPT

## 2022-03-30 PROCEDURE — 80177 DRUG SCRN QUAN LEVETIRACETAM: CPT | Mod: 90

## 2022-03-30 PROCEDURE — 99000 SPECIMEN HANDLING OFFICE-LAB: CPT

## 2022-03-30 PROCEDURE — 80156 ASSAY CARBAMAZEPINE TOTAL: CPT | Mod: 90

## 2022-03-30 PROCEDURE — 80053 COMPREHEN METABOLIC PANEL: CPT

## 2022-03-30 PROCEDURE — G0103 PSA SCREENING: HCPCS

## 2022-03-30 PROCEDURE — 36415 COLL VENOUS BLD VENIPUNCTURE: CPT

## 2022-03-31 LAB — LEVETIRACETAM SERPL-MCNC: 19 UG/ML

## 2022-04-01 LAB
ALBUMIN SERPL-MCNC: 4 G/DL (ref 3.4–5)
ALP SERPL-CCNC: 52 U/L (ref 40–150)
ALT SERPL W P-5'-P-CCNC: 23 U/L (ref 0–70)
ANION GAP SERPL CALCULATED.3IONS-SCNC: 7 MMOL/L (ref 3–14)
AST SERPL W P-5'-P-CCNC: 21 U/L (ref 0–45)
BILIRUB SERPL-MCNC: 0.3 MG/DL (ref 0.2–1.3)
BUN SERPL-MCNC: 15 MG/DL (ref 7–30)
CALCIUM SERPL-MCNC: 8.9 MG/DL (ref 8.5–10.1)
CHLORIDE BLD-SCNC: 104 MMOL/L (ref 94–109)
CHOLEST SERPL-MCNC: 195 MG/DL
CO2 SERPL-SCNC: 25 MMOL/L (ref 20–32)
CREAT SERPL-MCNC: 1.04 MG/DL (ref 0.66–1.25)
FASTING STATUS PATIENT QL REPORTED: YES
GFR SERPL CREATININE-BSD FRML MDRD: 79 ML/MIN/1.73M2
GLUCOSE BLD-MCNC: 99 MG/DL (ref 70–99)
HDLC SERPL-MCNC: 58 MG/DL
LDLC SERPL CALC-MCNC: 111 MG/DL
NONHDLC SERPL-MCNC: 137 MG/DL
POTASSIUM BLD-SCNC: 4.6 MMOL/L (ref 3.4–5.3)
PROT SERPL-MCNC: 7.7 G/DL (ref 6.8–8.8)
PSA SERPL-MCNC: 1.83 UG/L (ref 0–4)
SODIUM SERPL-SCNC: 136 MMOL/L (ref 133–144)
TRIGL SERPL-MCNC: 129 MG/DL

## 2022-04-05 LAB
CARBAMAZEPINE EP SERPL-MCNC: 3.1 UG/ML
CARBAMAZEPINE SERPL-MCNC: 12.3 UG/ML

## 2022-04-25 ENCOUNTER — OFFICE VISIT (OUTPATIENT)
Dept: INTERNAL MEDICINE | Facility: CLINIC | Age: 67
End: 2022-04-25
Payer: COMMERCIAL

## 2022-04-25 VITALS
WEIGHT: 189 LBS | RESPIRATION RATE: 16 BRPM | HEIGHT: 70 IN | SYSTOLIC BLOOD PRESSURE: 128 MMHG | BODY MASS INDEX: 27.06 KG/M2 | OXYGEN SATURATION: 98 % | HEART RATE: 75 BPM | TEMPERATURE: 97.7 F | DIASTOLIC BLOOD PRESSURE: 74 MMHG

## 2022-04-25 DIAGNOSIS — M25.512 CHRONIC LEFT SHOULDER PAIN: ICD-10-CM

## 2022-04-25 DIAGNOSIS — G89.29 CHRONIC LEFT SHOULDER PAIN: ICD-10-CM

## 2022-04-25 DIAGNOSIS — Z23 HIGH PRIORITY FOR 2019-NCOV VACCINE: ICD-10-CM

## 2022-04-25 DIAGNOSIS — A63.0 GENITAL WARTS: ICD-10-CM

## 2022-04-25 DIAGNOSIS — R56.9 CONVULSIONS, UNSPECIFIED CONVULSION TYPE (H): ICD-10-CM

## 2022-04-25 DIAGNOSIS — N40.1 BENIGN PROSTATIC HYPERPLASIA WITH LOWER URINARY TRACT SYMPTOMS, SYMPTOM DETAILS UNSPECIFIED: ICD-10-CM

## 2022-04-25 DIAGNOSIS — E78.5 HYPERLIPIDEMIA LDL GOAL <100: ICD-10-CM

## 2022-04-25 DIAGNOSIS — Z00.00 MEDICARE ANNUAL WELLNESS VISIT, SUBSEQUENT: Primary | ICD-10-CM

## 2022-04-25 PROCEDURE — 91305 COVID-19,PF,PFIZER (12+ YRS): CPT | Performed by: INTERNAL MEDICINE

## 2022-04-25 PROCEDURE — 99214 OFFICE O/P EST MOD 30 MIN: CPT | Mod: 25 | Performed by: INTERNAL MEDICINE

## 2022-04-25 PROCEDURE — 0054A COVID-19,PF,PFIZER (12+ YRS): CPT | Performed by: INTERNAL MEDICINE

## 2022-04-25 PROCEDURE — 54056 CRYOSURGERY PENIS LESION(S): CPT | Performed by: INTERNAL MEDICINE

## 2022-04-25 PROCEDURE — 99397 PER PM REEVAL EST PAT 65+ YR: CPT | Mod: 25 | Performed by: INTERNAL MEDICINE

## 2022-04-25 RX ORDER — TAMSULOSIN HYDROCHLORIDE 0.4 MG/1
CAPSULE ORAL
Qty: 180 CAPSULE | Refills: 3 | Status: SHIPPED | OUTPATIENT
Start: 2022-04-25 | End: 2023-11-06

## 2022-04-25 RX ORDER — ROSUVASTATIN CALCIUM 20 MG/1
20 TABLET, COATED ORAL DAILY
Qty: 30 TABLET | Refills: 11 | Status: SHIPPED | OUTPATIENT
Start: 2022-04-25 | End: 2022-05-10

## 2022-04-25 ASSESSMENT — ENCOUNTER SYMPTOMS
CHILLS: 0
NERVOUS/ANXIOUS: 0
HEADACHES: 0
HEARTBURN: 0
PALPITATIONS: 0
ARTHRALGIAS: 0
NAUSEA: 0
EYE PAIN: 0
DIZZINESS: 1
ABDOMINAL PAIN: 0
FEVER: 0
JOINT SWELLING: 0
FREQUENCY: 0
DYSURIA: 0
HEMATURIA: 0
HEMATOCHEZIA: 0
CONSTIPATION: 0
MYALGIAS: 0
WEAKNESS: 0
DIARRHEA: 0
SHORTNESS OF BREATH: 0
PARESTHESIAS: 0
COUGH: 0
SORE THROAT: 0

## 2022-04-25 ASSESSMENT — PATIENT HEALTH QUESTIONNAIRE - PHQ9: SUM OF ALL RESPONSES TO PHQ QUESTIONS 1-9: 4

## 2022-04-25 ASSESSMENT — ACTIVITIES OF DAILY LIVING (ADL): CURRENT_FUNCTION: NO ASSISTANCE NEEDED

## 2022-04-25 NOTE — PROGRESS NOTES
"SUBJECTIVE:   Leighton Morales is a 66 year old male who presents for Preventive Visit and follow-up regarding hyperlipidemia, BPH and seizure disorder      Patient has been advised of split billing requirements and indicates understanding: Yes  Are you in the first 12 months of your Medicare coverage?  No    Healthy Habits:     In general, how would you rate your overall health?  Good    Frequency of exercise:  None    Do you usually eat at least 4 servings of fruit and vegetables a day, include whole grains    & fiber and avoid regularly eating high fat or \"junk\" foods?  No    Taking medications regularly:  Yes    Medication side effects:  Not applicable    Ability to successfully perform activities of daily living:  No assistance needed    Home Safety:  No safety concerns identified    Hearing Impairment:  No hearing concerns    In the past 6 months, have you been bothered by leaking of urine?  No    In general, how would you rate your overall mental or emotional health?  Good      PHQ-2 Total Score: 0    Additional concerns today:  Yes    Do you feel safe in your environment? Yes    Have you ever done Advance Care Planning? (For example, a Health Directive, POLST, or a discussion with a medical provider or your loved ones about your wishes): Yes, patient states has an Advance Care Planning document and will bring a copy to the clinic.       Fall risk  Fallen 2 or more times in the past year?: Yes  Any fall with injury in the past year?: No    Cognitive Screening   1) Repeat 3 items (Leader, Season, Table)    2) Clock draw: NORMAL  3) 3 item recall: Recalls NO objects   Results: Abnormal Clock-11:05 No items recalled    Mini-CogTM Copyright ASHOK Araiza. Licensed by the author for use in [facility]; reprinted with permission (devon@[email or web address]). All rights reserved.      Do you have sleep apnea, excessive snoring or daytime drowsiness?: yes    Reviewed and updated as needed this visit by clinical staff           "          Reviewed and updated as needed this visit by Provider                   Social History     Tobacco Use     Smoking status: Former Smoker     Packs/day: 0.50     Quit date: 1978     Years since quittin.6     Smokeless tobacco: Never Used   Substance Use Topics     Alcohol use: Yes     Comment:  1-2 beers  per week     If you drink alcohol do you typically have >3 drinks per day or >7 drinks per week? No    Alcohol Use 2022   Prescreen: >3 drinks/day or >7 drinks/week? No   Prescreen: >3 drinks/day or >7 drinks/week? -           Current providers sharing in care for this patient include:   Patient Care Team:  Los Mcelroy MD as PCP - General (Internal Medicine)  Cherelle Kamara MD as MD (Neurology)  Los Mcelroy MD as Assigned PCP  Carmen Patton MD as Assigned Neuroscience Provider    The following health maintenance items are reviewed in Epic and correct as of today:  Health Maintenance Due   Topic Date Due     ANNUAL REVIEW OF  ORDERS  Never done     AORTIC ANEURYSM SCREENING (SYSTEM ASSIGNED)  Never done     MEDICARE ANNUAL WELLNESS VISIT  2022     FALL RISK ASSESSMENT  2022     Labs reviewed in EPIC    Component      Latest Ref Rng & Units 2020 2021 3/30/2022   Sodium      133 - 144 mmol/L 134 139 136   Potassium      3.4 - 5.3 mmol/L 4.3 3.6 4.6   Chloride      94 - 109 mmol/L 100 106 104   Carbon Dioxide      20 - 32 mmol/L 27 28 25   Anion Gap      3 - 14 mmol/L 7 5 7   Glucose      70 - 99 mg/dL 95 78 99   Urea Nitrogen      7 - 30 mg/dL 12 11 15   Creatinine      0.66 - 1.25 mg/dL 1.06 0.99 1.04   GFR Estimate      >60 mL/min/1.73m2 74 79 79   GFR Estimate If Black      >60 mL/min/1.73:m2 85 >90    Calcium      8.5 - 10.1 mg/dL 9.1 8.8 8.9   Bilirubin Total      0.2 - 1.3 mg/dL 0.4 0.3 0.3   Albumin      3.4 - 5.0 g/dL 3.8 3.6 4.0   Protein Total      6.8 - 8.8 g/dL 7.5 7.4 7.7   Alkaline Phosphatase      40 - 150 U/L 50 49    ALT      0 - 70 U/L 16 29  "23   AST      0 - 45 U/L 9 25 21   Alkaline Phosphatase      40 - 150 U/L   52   Cholesterol      <200 mg/dL 177 215 (H) 195   Triglycerides      <150 mg/dL 92 120 129   HDL Cholesterol      >=40 mg/dL 62 83 58   LDL Cholesterol Calculated      <=100 mg/dL 97 108 (H) 111 (H)   Non HDL Cholesterol      <130 mg/dL 115 132 (H) 137 (H)   Patient Fasting > 8hrs?         Yes   10, 11 Epoxide Level      ug/mL   3.1   Carbamazepine Total Level      4.0 - 12.0 ug/mL   12.3 (H)   PSA      0.00 - 4.00 ug/L 1.53  1.83   Keppra (Levetiracetam) Level      10 - 40 ug/mL   19           Review of Systems   Constitutional: Negative for chills and fever.   HENT: Negative for congestion, ear pain, hearing loss and sore throat.    Eyes: Negative for pain and visual disturbance.   Respiratory: Negative for cough and shortness of breath.    Cardiovascular: Negative for chest pain, palpitations and peripheral edema.   Gastrointestinal: Negative for abdominal pain, constipation, diarrhea, heartburn, hematochezia and nausea.   Genitourinary: Negative for dysuria, frequency, genital sores, hematuria, impotence, penile discharge and urgency.   Musculoskeletal: Negative for arthralgias, joint swelling and myalgias.   Skin: Negative for rash.   Neurological: Negative for weakness, headaches and paresthesias.   Psychiatric/Behavioral: Negative for mood changes. The patient is not nervous/anxious.      Weight up 2 pounds from last visit  Eye exam last Fall 2021   Urine flow  with occ spray. Nocturia x2. Still has caffeine 2 cups in AM. On Flomax. 2 caps daily   Left shoulder pain for 1 year. Stopped working out due to pain. No trauma   No seizure  episoides with meds. Followed by neuro  Last dizziness 2-3 mos ago. Resolved with Meclizine. No recurrence since that time      OBJECTIVE:   /74   Pulse 75   Temp 97.7  F (36.5  C) (Temporal)   Resp 16   Ht 1.765 m (5' 9.5\")   Wt 85.7 kg (189 lb)   SpO2 98%   BMI 27.51 kg/m   Estimated body " "mass index is 26.08 kg/m  as calculated from the following:    Height as of 1/22/21: 5' 11\" (1.803 m).    Weight as of 1/22/21: 187 lb (84.8 kg).  Physical Exam  General appearance -   alert, no distress  Skin -no trunk rashes.  Penile skin shows a total of 7 genital warts with the largest approximately 4 to 5 mm in diameter.  4 are concentrated at the base of the penis and 3 others were on the shaft of the penis.  Head - normocephalic, atraumatic  Eyes - LIZ, EOMI, fundi exam with nondilated pupils negative.  Ears - External ears normal. Canals clear. TM's normal.  Nose/Sinuses - Nares normal. Septum midline. Mucosa normal. No drainage or sinus tenderness.  Oropharynx - No erythema, no adenopathy, no exudates.  Neck - Supple without adenopathy or thyromegaly. No bruits.  Lungs - Clear to auscultation without wheezes/rhonchi.  Heart - Regular rate and rhythm without murmurs, clicks, or gallops.  Nodes - No supraclavicular, axillary, or inguinal adenopathy palpable.  Abdomen - Abdomen soft, non-tender. BS normal. No masses or hepatosplenomegaly palpable. No bruits.  Extremities -No cyanosis, clubbing or edema.    Musculoskeletal - Spine ROM normal. Muscular strength intact.  Tenderness to abduction left shoulder beyond 80 degrees.  No tenderness to internal/external rotation  Peripheral pulses - radial=4/4, femoral=4/4, posterior tibial=4/4, dorsalis pedis=4/4,  Neuro - Gait normal. Reflexes normal and symmetric. Sensation grossly WNL.  Genital - Normal-appearing male external genitalia. No scrotal masses or inguinal hernia palpable.   Rectal - Guaic negative stool. Normal tone. Prostate 1 plus in size to palpation. No rectal masses or prostate nodularity palpable      ASSESSMENT / PLAN:   1. Medicare annual wellness visit, subsequent  Due for COVID booster.  Other healthcare maintenance up-to-date    2. Hyperlipidemia LDL goal <100  Lipids not at goal with pravastatin.  Will change to rosuvastatin 20 mg daily.  " "Repeat lipid labs in 1 month.  Micromedex reviewed to confirm no drug interaction between rosuvastatin and patient's seizure medications  - rosuvastatin (CRESTOR) 20 MG tablet; Take 1 tablet (20 mg) by mouth daily  Dispense: 30 tablet; Refill: 11  - CK total; Future  - Hepatic function panel; Future  - Lipid panel reflex to direct LDL Fasting; Future    3. Convulsions, unspecified convulsion type (H)   Patient denies recent seizure activity.  Followed by [facility].  Continue management per neurology    4. Benign prostatic hyperplasia with lower urinary tract symptoms, symptom details unspecified  Mild symptoms still present despite maximum dose tamsulosin.  Counseled regarding decrease caffeine intake.  If symptoms remain bothersome, patient to inform physician and will refer on to urology  - tamsulosin (FLOMAX) 0.4 MG capsule; TAKE 2 CAPSULES DAILY  Dispense: 180 capsule; Refill: 3    5. Genital warts  Total of 7 genital warts noted on penis.  Patient states these have been increasing in size and number.  Therefore treated with liquid nitrogen therapy.  Counseled regarding condom use .  Patient is currently sexually active  - DESTRUCT BENIGN LESION, UP TO 14    6. Chronic left shoulder pain  Symptoms present for about a year.  No trauma.  Will refer for physical therapy.  If not improving with that, patient will let me know and we will order x-ray and refer on to orthopedic surgeon  - Physical Therapy Referral; Future    7. High priority for 2019-nCoV vaccine  Candidate for COVID booster  - COVID-19,PF,PFIZER (12+ Yrs GRAY LABEL)        Patient has been advised of split billing requirements and indicates understanding: Yes    COUNSELING:  Reviewed preventive health counseling, as reflected in patient instructions    Estimated body mass index is 26.08 kg/m  as calculated from the following:    Height as of 1/22/21: 5' 11\" (1.803 m).    Weight as of 1/22/21: 187 lb (84.8 kg).        He reports that he quit smoking about " 43 years ago. He smoked 0.50 packs per day. He has never used smokeless tobacco.      Appropriate preventive services were discussed with this patient, including applicable screening as appropriate for cardiovascular disease, diabetes, osteopenia/osteoporosis, and glaucoma.  As appropriate for age/gender, discussed screening for colorectal cancer, prostate cancer, breast cancer, and cervical cancer. Checklist reviewing preventive services available has been given to the patient.    Reviewed patients plan of care and provided an AVS. The Basic Care Plan (routine screening as documented in Health Maintenance) for Leighton meets the Care Plan requirement. This Care Plan has been established and reviewed with the Patient.    Counseling Resources:  ATP IV Guidelines  Pooled Cohorts Equation Calculator  Breast Cancer Risk Calculator  Breast Cancer: Medication to Reduce Risk  FRAX Risk Assessment  ICSI Preventive Guidelines  Dietary Guidelines for Americans, 2010  [employer]'s MyPlate  ASA Prophylaxis  Lung CA Screening      PLAN:   Stop Pravastatin   Start Rosuvastatin 20mg tab, 1 tab daily for cholesterol management  Call  [phone] or use [facility] to schedule a future lab appointment  fasting in 1 month.   For fasting labs, please refrain from eating for 8 hours or more.   Drink 2 glasses of water before your lab appointment. It is fine to take your  oral medications on the morning of the lab test as usual  Vaccinations: Pfizer covid vaccine   Liquid nitrogen treatment for penile  genital warts. If not resolving in the next 1 month, then see me back for further freezing treatments.    Reduce caffeine in diet to help with urine issues. If urinary issues persist and bothersome, then let me know and will refer to Urology  Continue other meds.  Referral to [facility] for physical therapy for left shoulder. They will call to schedule  Pt was informed regarding extra E&M billing for management of new or established medical issues not  related to today's wellness visit      Los Mcelroy MD  [facility]

## 2022-04-25 NOTE — PATIENT INSTRUCTIONS
Stop Pravastatin   Start Rosuvastatin 20mg tab, 1 tab daily for cholesterol management  Call  [phone] or use [facility] to schedule a future lab appointment  fasting in 1 month.   For fasting labs, please refrain from eating for 8 hours or more.   Drink 2 glasses of water before your lab appointment. It is fine to take your  oral medications on the morning of the lab test as usual  Vaccinations: Pfizer covid vaccine   Liquid nitrogen treatment for penile  genital warts. If not resolving in the next 1 month, then see me back for further freezing treatments.    Reduce caffeine in diet to help with urine issues. If urinary issues persist and bothersome, then let me know and will refer to Urology  Continue other meds.  Referral to [facility] for physical therapy for left shoulder. They will call to schedule  Pt was informed regarding extra E&M billing for management of new or established medical issues not related to today's wellness visit

## 2022-04-29 ENCOUNTER — THERAPY VISIT (OUTPATIENT)
Dept: PHYSICAL THERAPY | Facility: CLINIC | Age: 67
End: 2022-04-29
Attending: INTERNAL MEDICINE
Payer: COMMERCIAL

## 2022-04-29 DIAGNOSIS — G89.29 CHRONIC LEFT SHOULDER PAIN: ICD-10-CM

## 2022-04-29 DIAGNOSIS — M25.512 CHRONIC LEFT SHOULDER PAIN: ICD-10-CM

## 2022-04-29 PROCEDURE — 97110 THERAPEUTIC EXERCISES: CPT | Mod: GP | Performed by: PHYSICAL THERAPIST

## 2022-04-29 PROCEDURE — 97161 PT EVAL LOW COMPLEX 20 MIN: CPT | Mod: GP | Performed by: PHYSICAL THERAPIST

## 2022-04-29 NOTE — PROGRESS NOTES
[facility]    OUTPATIENT Physical Therapy ORTHOPEDIC EVALUATION  PLAN OF TREATMENT FOR OUTPATIENT REHABILITATION  (COMPLETE FOR INITIAL CLAIMS ONLY)  Patient's Last Name, First Name, M.I.  YOB: 1955  Leighton Morales    Provider s Name:  [facility]   Medical Record No.  [id number]   Start of Care Date:  04/29/22   Onset Date:    (MD office visit 4-25-22)   Type:     _X__PT   ___OT Medical Diagnosis:    Encounter Diagnosis   Name Primary?     Chronic left shoulder pain         Treatment Diagnosis:  L shoulder pain        Goals:     04/29/22 0500   Body Part   Goals listed below are for L shoulder   Goal #1   Goal #1 reaching   Previous Functional Level No restrictions   Current Functional Level Can reach ;overhead   Performance level pain level up to 8/10   STG Target Performance Reach ;overhead   Performance level pain level <5/10   Rationale for independent personal hygiene;for dressing;for bathing;for meal preparation   Due date 05/27/22   LTG Target Performance Reach;overhead   Performance Level pain level <2/10   Rationale for independent personal hygiene;for dressing;for bathing;for meal preparation   Due date 06/10/22         Therapy Frequency:  1x/week  Predicted Duration of Therapy Intervention:  6 weeks    Monserrat Leggett, PT                 I CERTIFY THE NEED FOR THESE SERVICES FURNISHED UNDER        THIS PLAN OF TREATMENT AND WHILE UNDER MY CARE     (Physician attestation of this document indicates review and certification of the therapy plan).                       Certification Date From:  04/29/22   Certification Date To:  06/10/22    Referring Provider:  Los Mcelroy    Initial Assessment        See Epic Evaluation SOC Date: 04/29/22

## 2022-04-29 NOTE — PROGRESS NOTES
Physical Therapy Initial Evaluation  Subjective:  The history is provided by the patient. No  was used.   Patient Health History  Leighton Morales being seen for L shoulder pain.     Date of Onset: 3-4 months ago.   Problem occurred: unknown   Pain is reported as 4/10 on pain scale.  General health as reported by patient is good.  Pertinent medical history includes: seizures.   Red flags:  None as reported by patient.  Medical allergies: none reported.   Surgeries include:  Other. Other surgery history details: seizure related.    Current medications:  Anti-seizure medication.    Current occupation is Retired.                     Therapist Generated HPI Evaluation  Problem details: Pt presents with complaints of L shoulder pain. Pt reported onset of sx's several months ago. Pt reported he was unsure as to what provoked current sx's but indicated he did sustain a fall around that time. Pt reported he hit his leg against a coffee table (in a dark room) and fell face forwards. Pt reported he was able to get up from the floor without issue and went to bed. Unaware of any shoulder pain the next day (s). Pt reports he is L hand dominant and pain interferes with his ability to reach forwards or out to the side.         Type of problem:  Left shoulder.    This is a new condition.  Condition occurred with:  Unknown cause (possibly related to a fall).  Where condition occurred: for unknown reasons (see above).  Patient reports pain:  Lateral and upper arm.  Pain is described as aching   Pain is worse during the day and worse during the night.  Since onset symptoms are unchanged.  Associated symptoms:  Loss of motion/stiffness. Symptoms are exacerbated by using arm at shoulder level, using arm behind back and using arm overhead  and relieved by other (avoiding aggravating movements).                              Objective:  Standing Alignment:    Cervical/Thoracic:  Thoracic kyphosis  increased                                         Shoulder Evaluation:  ROM:  AROM:    Flexion:  Left:  End range loss    Right:  End range loss                    Flexion/External Rotation:  Left:  Upper thoracic spine; pain free    Right:  Upper thoracic spine; pain free  Extension/Internal Rotation:  Left:  Lower thoracic spin; pain end range    Right:  Mid thoracic spine    PROM:    Flexion:  Left:  End range loss; pain    Right: end range loss; pain      Abduction:  Left:  WFL; pain at end range    Right:  WFL; pain at end range                          Strength:  normal                        Special Tests:    Left shoulder positive for the following special tests:  Impingement  Left shoulder negative for the following special tests:  Rotator cuff tear  Right shoulder positive for the following special tests:Impingement  Right shoulder negative for the following special tests:Rotator cuff tear                                       General     ROS    Assessment/Plan:    Patient is a 66 year old male with left side shoulder complaints.    Patient has the following significant findings with corresponding treatment plan.                Diagnosis 1:  Left shoulder pain  Pain -  manual therapy, self management, education and home program  Decreased ROM/flexibility - manual therapy and therapeutic exercise  Decreased joint mobility - manual therapy and therapeutic exercise  Decreased function - therapeutic activities    Therapy Evaluation Codes:   1) History comprised of:   Personal factors that impact the plan of care:      None.    Comorbidity factors that impact the plan of care are:      None.     Medications impacting care: None.  2) Examination of Body Systems comprised of:   Body structures and functions that impact the plan of care:      Shoulder.   Activity limitations that impact the plan of care are:      Bathing, Dressing, Sleeping and reaching.  3) Clinical presentation characteristics  are:   Stable/Uncomplicated.  4) Decision-Making    Low complexity using standardized patient assessment instrument and/or measureable assessment of functional outcome.  Cumulative Therapy Evaluation is: Low complexity.    Previous and current functional limitations:  (See Goal Flow Sheet for this information)    Short term and Long term goals: (See Goal Flow Sheet for this information)     Communication ability:  Patient appears to be able to clearly communicate and understand verbal and written communication and follow directions correctly.  Treatment Explanation - The following has been discussed with the patient:   RX ordered/plan of care  Anticipated outcomes  Possible risks and side effects  This patient would benefit from PT intervention to resume normal activities.   Rehab potential is good.    Frequency:  1 X week, once daily  Duration:  for 6 weeks  Discharge Plan:  Independent in home treatment program.  Reach maximal therapeutic benefit.    Please refer to the daily flowsheet for treatment today, total treatment time and time spent performing 1:1 timed codes.

## 2022-05-09 ENCOUNTER — NURSE TRIAGE (OUTPATIENT)
Dept: INTERNAL MEDICINE | Facility: CLINIC | Age: 67
End: 2022-05-09

## 2022-05-09 NOTE — TELEPHONE ENCOUNTER
Pt's alonso reports pt has dizziness and slurred speech. Asked triage to call him. Triage called pt . He complains of dizziness admit slurred speech. Notes he has had intermittent problems with this for 2 months. Had an MRI and has future appt with Neurology. Pt states in the past, he wait 4-5 hrs and symptoms improve but he is not able to safety walk during call. Triage called 911 with pt's consent. Writer advised he follow up with neurology and give them update of symptoms once discharged from ER.    Reason for Disposition    Difficult to awaken or acting confused (e.g., disoriented, slurred speech)    Additional Information    Negative: Shock suspected (e.g., cold/pale/clammy skin, too weak to stand, low BP, rapid pulse)    Protocols used: DIZZINESS-A-OH

## 2022-05-10 ENCOUNTER — HOSPITAL ENCOUNTER (INPATIENT)
Facility: CLINIC | Age: 67
LOS: 1 days | Discharge: HOME | DRG: 149 | End: 2022-05-11
Attending: EMERGENCY MEDICINE | Admitting: STUDENT IN AN ORGANIZED HEALTH CARE EDUCATION/TRAINING PROGRAM
Payer: COMMERCIAL

## 2022-05-10 ENCOUNTER — APPOINTMENT (OUTPATIENT)
Dept: CT IMAGING | Facility: CLINIC | Age: 67
DRG: 149 | End: 2022-05-10
Attending: EMERGENCY MEDICINE
Payer: COMMERCIAL

## 2022-05-10 DIAGNOSIS — Z87.898 HISTORY OF SEIZURES: ICD-10-CM

## 2022-05-10 DIAGNOSIS — R41.82 ALTERED MENTAL STATUS, UNSPECIFIED ALTERED MENTAL STATUS TYPE: ICD-10-CM

## 2022-05-10 DIAGNOSIS — G40.909 SEIZURE SYNDROME (H): ICD-10-CM

## 2022-05-10 DIAGNOSIS — G40.119 PARTIAL EPILEPSY WITH INTRACTABLE EPILEPSY (H): ICD-10-CM

## 2022-05-10 DIAGNOSIS — T42.1X4A: ICD-10-CM

## 2022-05-10 LAB
ALBUMIN SERPL-MCNC: 3.6 G/DL (ref 3.4–5)
ALP SERPL-CCNC: 53 U/L (ref 40–150)
ALT SERPL W P-5'-P-CCNC: 49 U/L (ref 0–70)
AMMONIA PLAS-SCNC: 39 UMOL/L (ref 10–50)
ANION GAP SERPL CALCULATED.3IONS-SCNC: 4 MMOL/L (ref 3–14)
AST SERPL W P-5'-P-CCNC: 26 U/L (ref 0–45)
ATRIAL RATE - MUSE: 81 BPM
BASOPHILS # BLD AUTO: 0 10E3/UL (ref 0–0.2)
BASOPHILS NFR BLD AUTO: 1 %
BILIRUB SERPL-MCNC: 0.8 MG/DL (ref 0.2–1.3)
BUN SERPL-MCNC: 12 MG/DL (ref 7–30)
CALCIUM SERPL-MCNC: 8.6 MG/DL (ref 8.5–10.1)
CARBAMAZEPINE SERPL-MCNC: 22.1 MG/L
CHLORIDE BLD-SCNC: 108 MMOL/L (ref 94–109)
CO2 SERPL-SCNC: 28 MMOL/L (ref 20–32)
CREAT SERPL-MCNC: 0.9 MG/DL (ref 0.66–1.25)
DIASTOLIC BLOOD PRESSURE - MUSE: NORMAL MMHG
EOSINOPHIL # BLD AUTO: 0.1 10E3/UL (ref 0–0.7)
EOSINOPHIL NFR BLD AUTO: 2 %
ERYTHROCYTE [DISTWIDTH] IN BLOOD BY AUTOMATED COUNT: 13.2 % (ref 10–15)
ETHANOL SERPL-MCNC: <0.01 G/DL
GFR SERPL CREATININE-BSD FRML MDRD: >90 ML/MIN/1.73M2
GLUCOSE BLD-MCNC: 113 MG/DL (ref 70–99)
HCT VFR BLD AUTO: 41.9 % (ref 40–53)
HGB BLD-MCNC: 14.2 G/DL (ref 13.3–17.7)
IMM GRANULOCYTES # BLD: 0 10E3/UL
IMM GRANULOCYTES NFR BLD: 0 %
INTERPRETATION ECG - MUSE: NORMAL
LYMPHOCYTES # BLD AUTO: 0.8 10E3/UL (ref 0.8–5.3)
LYMPHOCYTES NFR BLD AUTO: 18 %
MCH RBC QN AUTO: 31.2 PG (ref 26.5–33)
MCHC RBC AUTO-ENTMCNC: 33.9 G/DL (ref 31.5–36.5)
MCV RBC AUTO: 92 FL (ref 78–100)
MONOCYTES # BLD AUTO: 0.3 10E3/UL (ref 0–1.3)
MONOCYTES NFR BLD AUTO: 7 %
NEUTROPHILS # BLD AUTO: 3.1 10E3/UL (ref 1.6–8.3)
NEUTROPHILS NFR BLD AUTO: 72 %
NRBC # BLD AUTO: 0 10E3/UL
NRBC BLD AUTO-RTO: 0 /100
P AXIS - MUSE: 58 DEGREES
PLATELET # BLD AUTO: 141 10E3/UL (ref 150–450)
POTASSIUM BLD-SCNC: 3.7 MMOL/L (ref 3.4–5.3)
PR INTERVAL - MUSE: 202 MS
PROT SERPL-MCNC: 7.2 G/DL (ref 6.8–8.8)
QRS DURATION - MUSE: 92 MS
QT - MUSE: 372 MS
QTC - MUSE: 432 MS
R AXIS - MUSE: -34 DEGREES
RBC # BLD AUTO: 4.55 10E6/UL (ref 4.4–5.9)
SARS-COV-2 RNA RESP QL NAA+PROBE: NEGATIVE
SODIUM SERPL-SCNC: 140 MMOL/L (ref 133–144)
SYSTOLIC BLOOD PRESSURE - MUSE: NORMAL MMHG
T AXIS - MUSE: 34 DEGREES
TROPONIN I SERPL HS-MCNC: 4 NG/L
VENTRICULAR RATE- MUSE: 81 BPM
WBC # BLD AUTO: 4.3 10E3/UL (ref 4–11)

## 2022-05-10 PROCEDURE — 85025 COMPLETE CBC W/AUTO DIFF WBC: CPT | Performed by: EMERGENCY MEDICINE

## 2022-05-10 PROCEDURE — 80053 COMPREHEN METABOLIC PANEL: CPT | Performed by: EMERGENCY MEDICINE

## 2022-05-10 PROCEDURE — 80156 ASSAY CARBAMAZEPINE TOTAL: CPT | Performed by: EMERGENCY MEDICINE

## 2022-05-10 PROCEDURE — 82077 ASSAY SPEC XCP UR&BREATH IA: CPT | Performed by: EMERGENCY MEDICINE

## 2022-05-10 PROCEDURE — 250N000013 HC RX MED GY IP 250 OP 250 PS 637: Performed by: STUDENT IN AN ORGANIZED HEALTH CARE EDUCATION/TRAINING PROGRAM

## 2022-05-10 PROCEDURE — U0005 INFEC AGEN DETEC AMPLI PROBE: HCPCS | Performed by: EMERGENCY MEDICINE

## 2022-05-10 PROCEDURE — 84484 ASSAY OF TROPONIN QUANT: CPT | Performed by: EMERGENCY MEDICINE

## 2022-05-10 PROCEDURE — 258N000003 HC RX IP 258 OP 636: Performed by: STUDENT IN AN ORGANIZED HEALTH CARE EDUCATION/TRAINING PROGRAM

## 2022-05-10 PROCEDURE — 82140 ASSAY OF AMMONIA: CPT | Performed by: EMERGENCY MEDICINE

## 2022-05-10 PROCEDURE — 80177 DRUG SCRN QUAN LEVETIRACETAM: CPT | Performed by: EMERGENCY MEDICINE

## 2022-05-10 PROCEDURE — C9803 HOPD COVID-19 SPEC COLLECT: HCPCS

## 2022-05-10 PROCEDURE — 99223 1ST HOSP IP/OBS HIGH 75: CPT | Mod: AI | Performed by: STUDENT IN AN ORGANIZED HEALTH CARE EDUCATION/TRAINING PROGRAM

## 2022-05-10 PROCEDURE — 99285 EMERGENCY DEPT VISIT HI MDM: CPT | Mod: 25

## 2022-05-10 PROCEDURE — 96361 HYDRATE IV INFUSION ADD-ON: CPT

## 2022-05-10 PROCEDURE — 120N000001 HC R&B MED SURG/OB

## 2022-05-10 PROCEDURE — 93005 ELECTROCARDIOGRAM TRACING: CPT

## 2022-05-10 PROCEDURE — 258N000003 HC RX IP 258 OP 636: Performed by: EMERGENCY MEDICINE

## 2022-05-10 PROCEDURE — 96360 HYDRATION IV INFUSION INIT: CPT

## 2022-05-10 PROCEDURE — 70450 CT HEAD/BRAIN W/O DYE: CPT

## 2022-05-10 PROCEDURE — 36415 COLL VENOUS BLD VENIPUNCTURE: CPT | Performed by: EMERGENCY MEDICINE

## 2022-05-10 RX ORDER — SODIUM CHLORIDE 9 MG/ML
INJECTION, SOLUTION INTRAVENOUS CONTINUOUS
Status: DISCONTINUED | OUTPATIENT
Start: 2022-05-10 | End: 2022-05-11 | Stop reason: HOSPADM

## 2022-05-10 RX ORDER — ONDANSETRON 4 MG/1
4 TABLET, ORALLY DISINTEGRATING ORAL EVERY 6 HOURS PRN
Status: DISCONTINUED | OUTPATIENT
Start: 2022-05-10 | End: 2022-05-11 | Stop reason: HOSPADM

## 2022-05-10 RX ORDER — LIDOCAINE 40 MG/G
CREAM TOPICAL
Status: DISCONTINUED | OUTPATIENT
Start: 2022-05-10 | End: 2022-05-11 | Stop reason: HOSPADM

## 2022-05-10 RX ORDER — ROSUVASTATIN CALCIUM 20 MG/1
20 TABLET, COATED ORAL DAILY
COMMUNITY
End: 2022-08-02

## 2022-05-10 RX ORDER — TAMSULOSIN HYDROCHLORIDE 0.4 MG/1
0.8 CAPSULE ORAL DAILY
Status: DISCONTINUED | OUTPATIENT
Start: 2022-05-11 | End: 2022-05-11 | Stop reason: HOSPADM

## 2022-05-10 RX ORDER — ACETAMINOPHEN 650 MG/1
650 SUPPOSITORY RECTAL EVERY 6 HOURS PRN
Status: DISCONTINUED | OUTPATIENT
Start: 2022-05-10 | End: 2022-05-11 | Stop reason: HOSPADM

## 2022-05-10 RX ORDER — ONDANSETRON 2 MG/ML
4 INJECTION INTRAMUSCULAR; INTRAVENOUS EVERY 6 HOURS PRN
Status: DISCONTINUED | OUTPATIENT
Start: 2022-05-10 | End: 2022-05-11 | Stop reason: HOSPADM

## 2022-05-10 RX ORDER — ACETAMINOPHEN 325 MG/1
650 TABLET ORAL EVERY 6 HOURS PRN
Status: DISCONTINUED | OUTPATIENT
Start: 2022-05-10 | End: 2022-05-11 | Stop reason: HOSPADM

## 2022-05-10 RX ORDER — LEVETIRACETAM 500 MG/1
1500 TABLET ORAL 2 TIMES DAILY
Status: DISCONTINUED | OUTPATIENT
Start: 2022-05-10 | End: 2022-05-11 | Stop reason: HOSPADM

## 2022-05-10 RX ADMIN — SODIUM CHLORIDE 1000 ML: 9 INJECTION, SOLUTION INTRAVENOUS at 13:04

## 2022-05-10 RX ADMIN — LEVETIRACETAM 1500 MG: 500 TABLET, FILM COATED ORAL at 21:24

## 2022-05-10 RX ADMIN — SODIUM CHLORIDE: 9 INJECTION, SOLUTION INTRAVENOUS at 21:41

## 2022-05-10 ASSESSMENT — ACTIVITIES OF DAILY LIVING (ADL)
TOILETING_ISSUES: NO
WALKING_OR_CLIMBING_STAIRS_DIFFICULTY: NO
WEAR_GLASSES_OR_BLIND: YES
ADLS_ACUITY_SCORE: 35
CONCENTRATING,_REMEMBERING_OR_MAKING_DECISIONS_DIFFICULTY: NO
ADLS_ACUITY_SCORE: 20
ADLS_ACUITY_SCORE: 20
VISION_MANAGEMENT: GLASSES
ADLS_ACUITY_SCORE: 35
DIFFICULTY_EATING/SWALLOWING: NO
ADLS_ACUITY_SCORE: 20
FALL_HISTORY_WITHIN_LAST_SIX_MONTHS: YES
DRESSING/BATHING_DIFFICULTY: NO
ADLS_ACUITY_SCORE: 35
NUMBER_OF_TIMES_PATIENT_HAS_FALLEN_WITHIN_LAST_SIX_MONTHS: 6
DOING_ERRANDS_INDEPENDENTLY_DIFFICULTY: NO
CHANGE_IN_FUNCTIONAL_STATUS_SINCE_ONSET_OF_CURRENT_ILLNESS/INJURY: YES
ADLS_ACUITY_SCORE: 35

## 2022-05-10 NOTE — ED NOTES
DATE:  5/10/2022   TIME OF RECEIPT FROM LAB:  0945  LAB TEST:  Carbemazepine  LAB VALUE:  22.1  RESULTS GIVEN WITH READ-BACK TO (PROVIDER):  Enriquema  TIME LAB VALUE REPORTED TO PROVIDER:   [id number]

## 2022-05-10 NOTE — PHARMACY-ADMISSION MEDICATION HISTORY
Pharmacy Medication History  Admission medication history interview status for the 5/10/2022  admission is complete. See EPIC admission navigator for prior to admission medications     Location of Interview: patient room  Medication history sources: Patient and Surescripts    Significant changes made to the medication list:  None    In the past week, patient estimated taking medication this percent of the time: greater than 90%    Additional medication history information:   Pt was recently switched from pravastatin to rosuvastatin.    Medication reconciliation completed by provider prior to medication history? No    Time spent in this activity: 15 min    Prior to Admission medications    Medication Sig Last Dose Taking? Auth Provider   carBAMazepine (TEGRETOL XR) 400 MG 12 hr tablet Take 2 tablets (800 mg) by mouth 2 times daily 5/10/2022 at am Yes Carmen Patton MD   levETIRAcetam (KEPPRA) 1000 MG tablet Take 1.5 tablets (1,500 mg) by mouth 2 times daily 5/10/2022 at am Yes Carmen Patton MD   meclizine (ANTIVERT) 25 MG tablet Take 1 tablet (25 mg) by mouth 3 times daily as needed for dizziness  at prn Yes Carmen Patton MD   Multiple Vitamin (MULTIVITAMINS PO) Take by mouth daily Past Week at Unknown time Yes Reported, Patient   rosuvastatin (CRESTOR) 20 MG tablet Take 20 mg by mouth daily 5/10/2022 at am Yes Unknown, Entered By History   tamsulosin (FLOMAX) 0.4 MG capsule TAKE 2 CAPSULES DAILY 5/10/2022 at am Yes Los Mcelroy MD   MELATONIN PO Take 1 capsule by mouth nightly as needed Unknown strength   Reported, Patient       The information provided in this note is only as accurate as the sources available at the time of update(s)

## 2022-05-10 NOTE — ED NOTES
[facility]  ED Nurse Handoff Report    ED Chief complaint: Dizziness      ED Diagnosis:   Final diagnoses:   Altered mental status, unspecified altered mental status type   Carbamazepine poisoning of undetermined intent, initial encounter   History of seizures       Code Status: MD to discuss    Allergies:   Allergies   Allergen Reactions     No Known Drug Allergies        Patient Story:        Pt presents to the ED with Vertigo episodes. Pt said this started 6 days ago. Today he feels dizzy and lightheaded while driving pulled over and called EMS.           Focused Assessment:  Alert to self, situation, and place. Pt presents with intermittent confusion. VSS     Treatments and/or interventions provided: Labs, vitals   Patient's response to treatments and/or interventions: Tolerated well, Pt pulled out 1 I.V due to confusion    To be done/followed up on inpatient unit:  N/A    Does this patient have any cognitive concerns?: Disoriented to time    Activity level - Baseline/Home:  Independent  Activity Level - Current: SBA    Patient's Preferred language: English   Needed?: No    Isolation: None  Infection: Not Applicable  Patient tested for COVID 19 prior to admission: YES  Bariatric?: No    Vital Signs:   Vitals:    05/10/22 1216   BP: (!) 145/84   Pulse: 77   Resp: 18   Temp: 98.2  F (36.8  C)   TempSrc: Oral   SpO2: 99%       Cardiac Rhythm:     Was the PSS-3 completed:   Yes  What interventions are required if any?  None                     For the majority of the shift this patient's behavior was Green.   Behavioral interventions performed were None    ED NURSE PHONE NUMBER: [phone]

## 2022-05-10 NOTE — H&P
"[facility]    History and Physical - Hospitalist Service       Date of Admission:  5/10/2022    Assessment & Plan      Leighton Morales is a 66 year old male with hx of seizure disorder, s/p resection of vascular malformation (1995) admitted on 5/10/2022 with dizziness.     Carbamazepine Toxicity   Dizziness, confusion, ataxia   Pt was seen by neurology in 3/22 with complaints of \"vertigo attacks\" MRI of the head and MRA of the brain and neck at that time were unremarkable. His carbamazepine level was checked then and was very slightly elevated to 12.3. He now presents to the ED with what he initially described as light-headedness and balance issues. He does not really describe symptoms consistent with vertigo. Basic laboratory evaluation in the ED was fairly unremarkable, however, while in the ED he was noted to be quite unsteady on his feet and mildly confused.   * His carbamazepine level was checked and found to be markedly elevated to 22.1. CT was negative for acute pathology. The elevated carbamazepine level may be the explanation for his symptoms.   - Continue cardiac monitoring   - Hold Carbamazepine   - Monitor levels   - Neurology consult   - PT/OT     Hx of Seizure disorder  S/p resection of vascular malformation   Pt has long hx of seizures with seizure onset at age 2 years old. He has been treated with multiple regimens of AEDs. He was identified to have a lesion of the middle right temporal lobe which was surgically resected in 1995. Pathology confirmed that this was a vascular malformation. He was seizure free for a few years, but then started having recurrent seizures. Most recently he has been well managed with the combination of levetiracetam and carbamazepine  - Holding Carbamazepine as noted above   - Continue levetiracetam    - Neurology consulted - pt would appreciate consultation with his regular neurologist before any adjustments are made to his AED regimen " "    Overweight: Estimated body mass index is 27.51 kg/m  as calculated from the following:    Height as of 4/25/22: 1.765 m (5' 9.5\").    Weight as of 4/25/22: 85.7 kg (189 lb).    Hyperlipidemia  - Hold PTA rosuvastatin for now      Diet:Regular diet   DVT Prophylaxis: Pneumatic Compression Devices  Pinedo Catheter: Not present  Central Lines: None  Cardiac Monitoring: None  Code Status: Full Code       Disposition Plan   Expected Discharge: TBD  Anticipated discharge location:  Awaiting care coordination huddle     The patient's care was discussed with the Patient.    Lisbeth [facility]  [facility]  Securely message with the Vocera Web Console (learn more here)  Text page via [employer] Paging/Directory         ______________________________________________________________________    Chief Complaint   Dizziness/unsteadiness     History is obtained from the patient    History of Present Illness   Leighton Morales is a 66 year old male who presents to the hospital with chief complaint of dizziness. The history is somewhat limited as pt cannot recall a lot of details. He did mention that he has been having on and off dizzy spells for several weeks. Today he was driving and started noticing that he felt quite dizzy. He actually described the sensation as feeling \"buzzed.\" He did not feel safe driving so he called EMS. When trying to walk he felt quite unsteady on his feet. He felt like he had to hold on to things to prevent him from falling over. He did not describe a room spinning sensation. No other current symptoms.     The patient really does not know the names or doses of the medications that he takes. He does not think that he is taking too much carbamazepine.     Review of Systems    The 10 point Review of Systems is negative other than noted in the HPI or here.     Past Medical History    I have reviewed this patient's medical history and updated it with pertinent " information if needed.   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  Past Surgical History:   Procedure Laterality Date     [facility] NONSPECIFIC PROCEDURE  1960    L Inquinal hernia     [facility] NONSPECIFIC PROCEDURE  0190    Repair Nasal fracture     [facility] NONSPECIFIC PROCEDURE  266804    temporal lobectomy - excision  av malformation     [facility] NONSPECIFIC PROCEDURE      Dental Extraction Age 13       Social History   I have reviewed this patient's social history and updated it with pertinent information if needed.  Social History     Tobacco Use     Smoking status: Former Smoker     Packs/day: 0.50     Quit date: 1978     Years since quittin.7     Smokeless tobacco: Never Used   Substance Use Topics     Alcohol use: Yes     Comment:  1-2 beers  per week     Drug use: Never       Family History   Reviewed and non-contributory     Prior to Admission Medications   Prior to Admission Medications   Prescriptions Last Dose Informant Patient Reported? Taking?   Multiple Vitamin (MULTIVITAMINS PO)   Yes No   Sig: Take by mouth daily   carBAMazepine (TEGRETOL XR) 400 MG 12 hr tablet   No No   Sig: Take 2 tablets (800 mg) by mouth 2 times daily   levETIRAcetam (KEPPRA) 1000 MG tablet   No No   Sig: Take 1.5 tablets (1,500 mg) by mouth 2 times daily   meclizine (ANTIVERT) 25 MG tablet   No No   Sig: Take 1 tablet (25 mg) by mouth 3 times daily as needed for dizziness   melatonin 5 MG CAPS   Yes No   Sig: Take 10 mg by mouth   rosuvastatin (CRESTOR) 20  MG tablet   No No   Sig: Take 1 tablet (20 mg) by mouth daily   tamsulosin (FLOMAX) 0.4 MG capsule   No No   Sig: TAKE 2 CAPSULES DAILY      Facility-Administered Medications: None     Allergies   Allergies   Allergen Reactions     No Known Drug Allergies        Physical Exam   Vital Signs: Temp: 98.2  F (36.8  C) Temp src: Oral BP: (!) 145/84 Pulse: 77   Resp: 18 SpO2: 99 % O2 Device: None (Room air)    Weight: 0 lbs 0 oz    Constitutional: Awake, alert, cooperative, no apparent distress.  Eyes: Conjunctiva and pupils examined and normal.  HEENT: Moist mucous membranes, normal dentition.  Respiratory: No increased work of breathing   Cardiovascular: Regular rate and rhythm  Skin: No rashes, no cyanosis, no edema.  Musculoskeletal: No joint swelling, erythema or tenderness.  Neurologic: Cranial nerves 2-12 intact, moves all extremities. He got out of bed on his own and did appear to be quite stable on his feet walking.   Psychiatric: Alert, oriented to person, place and time, no obvious anxiety or depression. He is slow to answer some questions and has a hard time remembering details of things (medicaiton names, dates, etc)       Data   Data reviewed today: I reviewed all medications, new labs and imaging results over the last 24 hours.    Recent Labs   Lab 05/10/22  1237   WBC 4.3   HGB 14.2   MCV 92   *      POTASSIUM 3.7   CHLORIDE 108   CO2 28   BUN 12   CR 0.90   ANIONGAP 4   DEREK 8.6   *   ALBUMIN 3.6   PROTTOTAL 7.2   BILITOTAL 0.8   ALKPHOS 53   ALT 49   AST 26     Recent Results (from the past 24 hour(s))   CT Head w/o Contrast    Narrative    CT SCAN OF THE HEAD WITHOUT CONTRAST   5/10/2022 2:16 PM     HISTORY: AMS, ataxia.    TECHNIQUE:  Axial images of the head and coronal reformations without  IV contrast material. Radiation dose for this scan was reduced using  automated exposure control, adjustment of the mA and/or kV according  to patient size, or iterative reconstruction  technique.    COMPARISON: MRI of the brain 3/18/2022.    FINDINGS: Again seen are findings of previous right-sided  parietotemporal craniotomy. Smooth thin curvilinear extra-axial soft  tissue density along the right cerebral convexity positioned directly  beneath the inner table of the calvarium, including underlying the  craniotomy flap, likely corresponding to previously seen ______ with  right convexity pachymeningeal thickening and enhancement on prior  MRI. This is most likely postsurgical in nature. Unchanged zone of  gliosis/encephalomalacia involving much of the right temporal lobe.  The ventricles are normal in size and configuration. Mild generalized  brain parenchymal volume loss. No definite CT findings of acute  infarct, acute intracranial hemorrhage, or mass effect/herniation.    Visualized orbits appear unremarkable. Visualized aspects of the  paranasal sinuses and mastoids are clear. No acute calvarial fracture  identified.      Impression    IMPRESSION:  1. No CT findings of acute intracranial process.  2. Unchanged findings of previous right parietotemporal craniotomy.  Unchanged encephalomalacia in the right temporal lobe.  3. Unchanged mild generalized brain parenchymal volume loss.

## 2022-05-10 NOTE — PROGRESS NOTES
RECEIVING UNIT ED HANDOFF REVIEW    ED Nurse Handoff Report was reviewed by: Grace Mar RN on May 10, 2022 at 5:51 PM

## 2022-05-10 NOTE — ED PROVIDER NOTES
History   Chief Complaint:  Light-headedness      The history is provided by the EMS personnel and the patient.   History limited by poor historian, supplemented by chart review    Leighton Morales is a 66 year old male with history of hypertension and seizure disorder who presents with lightheadedness.  EMS reports pt was driving son to airport when sudden onset of lightheadedness occurred. This is his 6th episode within several months, and there has been no treatment. The patient has a history of seizures, and reports brain surgery as a child. He reports taking seizure medication since the age of 6, though he has not had a seizure in the past 7 years. he reports  light headedness, exhaustion, and a loss of his sense of balance. Sitting down helps with the symptoms and they generally subside within 2-3 hours of onset. He denies pain, fever, cough, new pain, chest pain, or recent syncope. The patient drinks often, about 3 glasses of whiskey each time, but denies daily consumption. EMS reported a blood sugar of 134.  He denies any intentional overdose of his medication and does not think his medication is changed lately.    Review of Systems   Unable to perform ROS: Mental status change     Allergies:  The patient has no known allergies.     Medications:  Tegretol  Keppra  Antivert  Crestor  Flomax    Past Medical History:     Cavernous hemangioma of brain  Closed fracture of unspecified phalanx or phalanges of hand  Erectile dysfunction  Hyperlipidemia  Nasal bones, closed fracture  KAMRAN on CPAP  Convulsions  Benign prostatic hyperplasia with lower urinary tract symptoms      Past Surgical History:    Left inguinal hernia repair  Nasal fracture repair  Temporal lobectomy  Dental extraction     Social History:  The patient presents alone via EMS.  He has a son, taking him to the airport today.      Physical Exam     Patient Vitals for the past 24 hrs:   BP Temp Temp src Pulse Resp SpO2   05/10/22 1216 (!) 145/84 98.2  F  (36.8  C) Oral 77 18 99 %       Physical Exam  General: Male sitting upright in room 19  HENT: mucous membranes moist, OP clear, chronic deformity to right skull consistent with prior neurosurgery nontender  CV: rate as above, regular rhythm, no murmur audible, no lower extremity edema  Resp: normal effort, speaks in full phrases, no stridor, no cough observed  GI: abdomen soft and nontender, no guarding  MSK: no bony tenderness  Skin: appropriately warm and dry  Neuro: awake, alert, clear speech, follows basic commands and answer simple questions though provides inconsistent answers and is a poor historian, no nuchal rigidity, moderate ataxia with attempted ambulation  Psych: no evidence of hallucinations    Emergency Department Course   ECG  ECG obtained at 1246, ECG read at 1253  Normal sinus rhythm  Left axis deviation   No prior ECG for comparison.   Rate 81 bpm. MI interval 202 ms. QRS duration 92 ms. QT/QTc 372/432 ms. P-R-T axes 58 -34 34.     Imaging:  CT Head w/o Contrast   Preliminary Result   IMPRESSION:   1. No CT findings of acute intracranial process.   2. Unchanged findings of previous right parietotemporal craniotomy.   Unchanged encephalomalacia in the right temporal lobe.   3. Unchanged mild generalized brain parenchymal volume loss.      Report per radiology    Laboratory:  Labs Ordered and Resulted from Time of ED Arrival to Time of ED Departure   COMPREHENSIVE METABOLIC PANEL - Abnormal       Result Value    Sodium 140      Potassium 3.7      Chloride 108      Carbon Dioxide (CO2) 28      Anion Gap 4      Urea Nitrogen 12      Creatinine 0.90      Calcium 8.6      Glucose 113 (*)     Alkaline Phosphatase 53      AST 26      ALT 49      Protein Total 7.2      Albumin 3.6      Bilirubin Total 0.8      GFR Estimate >90     CARBAMAZEPINE TOTAL - Abnormal    Carbamazepine 22.1 (*)    CBC WITH PLATELETS AND DIFFERENTIAL - Abnormal    WBC Count 4.3      RBC Count 4.55      Hemoglobin 14.2       Hematocrit 41.9      MCV 92      MCH 31.2      MCHC 33.9      RDW 13.2      Platelet Count 141 (*)     % Neutrophils 72      % Lymphocytes 18      % Monocytes 7      % Eosinophils 2      % Basophils 1      % Immature Granulocytes 0      NRBCs per 100 WBC 0      Absolute Neutrophils 3.1      Absolute Lymphocytes 0.8      Absolute Monocytes 0.3      Absolute Eosinophils 0.1      Absolute Basophils 0.0      Absolute Immature Granulocytes 0.0      Absolute NRBCs 0.0     TROPONIN I - Normal    Troponin I High Sensitivity 4     ETHYL ALCOHOL LEVEL - Normal    Alcohol ethyl <0.01     COVID-19 VIRUS (CORONAVIRUS) BY PCR - Normal    SARS CoV2 PCR Negative     AMMONIA - Normal    Ammonia 39     KEPPRA (LEVETIRACETAM) LEVEL        Emergency Department Course:     Reviewed:  I reviewed nursing notes, vitals and past medical history    Assessments:  1154 I obtained history and examined the patient as noted above.   1337 I rechecked the patient and explained findings.     Consults:  0668 I consulted with Dr. Mathews, hospitalist, regarding the patient's history and presentation here in the emergency department who accepted the patient for admission.     Interventions:  1304 NS 1 L IV    Disposition:  The patient was admitted to the hospital under the care of Dr. Mathews.     Impression & Plan   Medical Decision Making:  His evaluation was made more challenging by the fact that he is a somewhat poor historian, though in talking with him more, it became clear that he was not fully oriented, and based on the results of his work-up I think this is most likely due to his supratherapeutic Tegretol level.  Duration of supratherapeutic level is unclear.  Vital signs are satisfactory.  No evidence of structural intracranial process.  Recurrent seizure activity is possible but thought to be less likely.  No signs of cardiac arrhythmia.  He is fully unfit to safely manage as an outpatient and therefore I have arranged for admission to a  monitored bed under the care of the hospitalist service for further multidisciplinary care.    Diagnosis:    ICD-10-CM    1. Altered mental status, unspecified altered mental status type  R41.82    2. Carbamazepine poisoning of undetermined intent, initial encounter  T42.1X4A    3. History of seizures  Z87.898        Scribe Disclosure:  I, BALDEMAR JADYN, am serving as a scribe at 12:18 PM on 5/10/2022 to document services personally performed by Boris Abbasi MD based on my observations and the provider's statements to me.   I, Renuka Burr, am serving as a scribe  at 12:56 PM on 5/10/2022 to document services personally performed by Boris Abbasi MD based on my observations and the provider's statements to me.    This note was completed in part using Dragon voice recognition software. Although reviewed after completion, some word and grammatical errors may occur.        Boris Abbasi MD  05/10/22 1548

## 2022-05-11 ENCOUNTER — TELEPHONE (OUTPATIENT)
Dept: NEUROLOGY | Facility: CLINIC | Age: 67
End: 2022-05-11
Payer: COMMERCIAL

## 2022-05-11 ENCOUNTER — HOSPITAL ENCOUNTER (INPATIENT)
Dept: NEUROLOGY | Facility: CLINIC | Age: 67
Discharge: HOME | DRG: 149 | End: 2022-05-11
Attending: PSYCHIATRY & NEUROLOGY
Payer: COMMERCIAL

## 2022-05-11 ENCOUNTER — APPOINTMENT (OUTPATIENT)
Dept: OCCUPATIONAL THERAPY | Facility: CLINIC | Age: 67
DRG: 149 | End: 2022-05-11
Attending: STUDENT IN AN ORGANIZED HEALTH CARE EDUCATION/TRAINING PROGRAM
Payer: COMMERCIAL

## 2022-05-11 ENCOUNTER — APPOINTMENT (OUTPATIENT)
Dept: PHYSICAL THERAPY | Facility: CLINIC | Age: 67
DRG: 149 | End: 2022-05-11
Attending: STUDENT IN AN ORGANIZED HEALTH CARE EDUCATION/TRAINING PROGRAM
Payer: COMMERCIAL

## 2022-05-11 VITALS
SYSTOLIC BLOOD PRESSURE: 144 MMHG | DIASTOLIC BLOOD PRESSURE: 90 MMHG | TEMPERATURE: 97.8 F | OXYGEN SATURATION: 98 % | HEART RATE: 62 BPM | RESPIRATION RATE: 18 BRPM

## 2022-05-11 LAB
ANION GAP SERPL CALCULATED.3IONS-SCNC: 5 MMOL/L (ref 3–14)
BUN SERPL-MCNC: 8 MG/DL (ref 7–30)
CALCIUM SERPL-MCNC: 8.2 MG/DL (ref 8.5–10.1)
CARBAMAZEPINE SERPL-MCNC: 7.9 MG/L
CHLORIDE BLD-SCNC: 106 MMOL/L (ref 94–109)
CO2 SERPL-SCNC: 28 MMOL/L (ref 20–32)
CREAT SERPL-MCNC: 0.74 MG/DL (ref 0.66–1.25)
FOLATE SERPL-MCNC: 8.5 NG/ML
GFR SERPL CREATININE-BSD FRML MDRD: >90 ML/MIN/1.73M2
GLUCOSE BLD-MCNC: 100 MG/DL (ref 70–99)
GLUCOSE BLDC GLUCOMTR-MCNC: 93 MG/DL (ref 70–99)
POTASSIUM BLD-SCNC: 3.4 MMOL/L (ref 3.4–5.3)
SODIUM SERPL-SCNC: 139 MMOL/L (ref 133–144)
TSH SERPL DL<=0.005 MIU/L-ACNC: 2.14 MU/L (ref 0.4–4)
VIT B12 SERPL-MCNC: 310 PG/ML (ref 193–986)

## 2022-05-11 PROCEDURE — 80177 DRUG SCRN QUAN LEVETIRACETAM: CPT | Performed by: PSYCHIATRY & NEUROLOGY

## 2022-05-11 PROCEDURE — 99217 PR OBSERVATION CARE DISCHARGE: CPT | Performed by: HOSPITALIST

## 2022-05-11 PROCEDURE — 97535 SELF CARE MNGMENT TRAINING: CPT | Mod: GO | Performed by: OCCUPATIONAL THERAPIST

## 2022-05-11 PROCEDURE — 80156 ASSAY CARBAMAZEPINE TOTAL: CPT | Performed by: STUDENT IN AN ORGANIZED HEALTH CARE EDUCATION/TRAINING PROGRAM

## 2022-05-11 PROCEDURE — 97530 THERAPEUTIC ACTIVITIES: CPT | Mod: GP

## 2022-05-11 PROCEDURE — 36415 COLL VENOUS BLD VENIPUNCTURE: CPT | Performed by: PSYCHIATRY & NEUROLOGY

## 2022-05-11 PROCEDURE — 86255 FLUORESCENT ANTIBODY SCREEN: CPT | Performed by: PSYCHIATRY & NEUROLOGY

## 2022-05-11 PROCEDURE — 36415 COLL VENOUS BLD VENIPUNCTURE: CPT | Performed by: STUDENT IN AN ORGANIZED HEALTH CARE EDUCATION/TRAINING PROGRAM

## 2022-05-11 PROCEDURE — 86596 VOLTAGE-GTD CA CHNL ANTB EA: CPT | Performed by: PSYCHIATRY & NEUROLOGY

## 2022-05-11 PROCEDURE — 97161 PT EVAL LOW COMPLEX 20 MIN: CPT | Mod: GP

## 2022-05-11 PROCEDURE — 97165 OT EVAL LOW COMPLEX 30 MIN: CPT | Mod: GO | Performed by: OCCUPATIONAL THERAPIST

## 2022-05-11 PROCEDURE — 250N000013 HC RX MED GY IP 250 OP 250 PS 637: Performed by: STUDENT IN AN ORGANIZED HEALTH CARE EDUCATION/TRAINING PROGRAM

## 2022-05-11 PROCEDURE — 97116 GAIT TRAINING THERAPY: CPT | Mod: GP

## 2022-05-11 PROCEDURE — 258N000003 HC RX IP 258 OP 636: Performed by: STUDENT IN AN ORGANIZED HEALTH CARE EDUCATION/TRAINING PROGRAM

## 2022-05-11 PROCEDURE — 95816 EEG AWAKE AND DROWSY: CPT

## 2022-05-11 PROCEDURE — 80048 BASIC METABOLIC PNL TOTAL CA: CPT | Performed by: STUDENT IN AN ORGANIZED HEALTH CARE EDUCATION/TRAINING PROGRAM

## 2022-05-11 PROCEDURE — 80161 ASY CARBAMAZEPIN 10,11-EPXID: CPT | Performed by: PSYCHIATRY & NEUROLOGY

## 2022-05-11 PROCEDURE — 82607 VITAMIN B-12: CPT | Performed by: PSYCHIATRY & NEUROLOGY

## 2022-05-11 PROCEDURE — 84443 ASSAY THYROID STIM HORMONE: CPT | Performed by: PSYCHIATRY & NEUROLOGY

## 2022-05-11 PROCEDURE — 82746 ASSAY OF FOLIC ACID SERUM: CPT | Performed by: PSYCHIATRY & NEUROLOGY

## 2022-05-11 RX ORDER — CARBAMAZEPINE 200 MG/1
600 TABLET ORAL DAILY
Status: DISCONTINUED | OUTPATIENT
Start: 2022-05-11 | End: 2022-05-11 | Stop reason: HOSPADM

## 2022-05-11 RX ORDER — CARBAMAZEPINE 400 MG/1
400 TABLET, EXTENDED RELEASE ORAL 2 TIMES DAILY
Qty: 360 TABLET | Refills: 3
Start: 2022-05-11 | End: 2023-03-04

## 2022-05-11 RX ADMIN — SODIUM CHLORIDE: 9 INJECTION, SOLUTION INTRAVENOUS at 08:18

## 2022-05-11 RX ADMIN — TAMSULOSIN HYDROCHLORIDE 0.8 MG: 0.4 CAPSULE ORAL at 08:55

## 2022-05-11 RX ADMIN — LEVETIRACETAM 1500 MG: 500 TABLET, FILM COATED ORAL at 08:55

## 2022-05-11 ASSESSMENT — ACTIVITIES OF DAILY LIVING (ADL)
DEPENDENT_IADLS:: INDEPENDENT
ADLS_ACUITY_SCORE: 20
PREVIOUS_RESPONSIBILITIES: MEAL PREP;HOUSEKEEPING;LAUNDRY;SHOPPING;YARDWORK;MEDICATION MANAGEMENT;FINANCES;DRIVING
ADLS_ACUITY_SCORE: 20

## 2022-05-11 NOTE — TELEPHONE ENCOUNTER
What is the concern that needs to be addressed by a nurse? Patient would like a call back ASAP to discuss a medical question ,patient would not give any other information.    May a detailed message be left on voicemail? yes    Date of last office visit: 03/07/2022    Message routed to: [facility] RN POOL

## 2022-05-11 NOTE — TELEPHONE ENCOUNTER
Spoke to Dr. Melanie oDrsey.     Leighton seems to have worsening memory decline, it seems possible medication errors. It seems for the past 6 months he has spells of light headed, speech becomes slurred, difficulty thinking, gets confused, continues for 5 hours, then feels fatigued, no convulsions.     These spells do not sound like seizure due to the length of the time.     At [facility] his carbamazepine level was 22 and carbamazepine was held for one day.     I advised Dr. Navarro to restart antiepileptic drug, home help with medication dispensing (his carbamazepine was high which I suspect may be secondary to , follow up with memory clinic.     Give extra carbamazepine 400 mg once (recent level was 7.9 which is a drop from baseline.     We can try to do inpatient Video EEG to characterize recurrent confusion spells or 3 days ambulatory EEG. I suspect he may decline inpatient due to insurance co pay concerns.      Follow up  With Abdi Patton MD
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
## 2022-05-11 NOTE — PLAN OF CARE
Goal Outcome Evaluation:    Reason for Admission: stroke work up. Has history of seizure     Code status:FC   Seizure or isolation precaution: seizure precaution   Cognitive/Mentation: A/Ox 4  Use of CPAP: pt said he is ok without using CPAP overnight. Writer consulted with RT   Neuros/CMS: Intact   CIWA Protocol: NA  VS: stable   Tele: SR  GI: BS active x4, passing flatus,  Continent.  : voiding w/o difficulty, Continent.  Use of harrington, external catheter, or urinal: N/A  Pulmonary: LS clear   Pain: No pain   Critical Labs to Monitor: N/A  Electrolyte Replacement Protocol: N/A   Use of oxygen: N/A   Heparin or other drips: N/A     BG checks: N/A  Tests, MRI, CT, JAIR, Echo: No tests pending currently    Drains: NS infusing at 100ml/hr   Skin: intact   Edema: No edema   Surgical site: N/A   Activity: SBA  Diet: Regular  with thin  liquids. Takes pills w  Tube Feeding: N/A   Discharge: pending.

## 2022-05-11 NOTE — PROGRESS NOTES
05/11/22 1133   Quick Adds   Type of Visit Initial Occupational Therapy Evaluation   Living Environment   People in Home alone   Current Living Arrangements house   Home Accessibility stairs to enter home   Number of Stairs, Main Entrance 3   Stair Railings, Main Entrance railings on both sides of stairs   Transportation Anticipated car, drives self;family or friend will provide   Living Environment Comments Patient lives in one level house with basement - 3 steps to enter with handrails. Tub shower with grab bars. SO is over daily, generally in the evenings after work.   Self-Care   Usual Activity Tolerance good   Current Activity Tolerance moderate   Equipment Currently Used at Home none   Fall history within last six months yes   Number of times patient has fallen within last six months 6   Activity/Exercise/Self-Care Comment Pt reports indep with I/ADLs at baseline   Instrumental Activities of Daily Living (IADL)   Previous Responsibilities meal prep;housekeeping;laundry;shopping;yardwork;medication management;finances;driving   General Information   Onset of Illness/Injury or Date of Surgery 05/10/22   Referring Physician Lisbeth Mathews   Patient/Family Therapy Goal Statement (OT) home   Additional Occupational Profile Info/Pertinent History of Current Problem 66 year old male with hx of seizure disorder, s/p resection of vascular malformation (1995) admitted on 5/10/2022 with dizziness   Existing Precautions/Restrictions fall;seizures   Left Upper Extremity (Weight-bearing Status) full weight-bearing (FWB)  (all)   Cognitive Status Examination   Orientation Status orientation to person, place and time   Affect/Mental Status (Cognitive) WNL   Follows Commands follows one-step commands;over 90% accuracy   Memory Deficit minimal deficit;short-term memory   Cognitive Status Comments pt reports STM deficits at baseline, but they have been worse in past 6 months; pt reports using checklists, and relying on  family to A   Visual Perception   Impact of Vision Impairment on Function (Vision) screen intact for visual fields; some difficulty tracking to LLQ on the diagonal, able to complete but not smoothly   Sensory   Sensory Quick Adds No deficits were identified   Pain Assessment   Patient Currently in Pain Yes, see Vital Sign flowsheet  (L shoulder baseline)   Posture   Posture protracted shoulders   Range of Motion Comprehensive   Comment, General Range of Motion BUE/BLE WFL   Strength Comprehensive (MMT)   Comment, General Manual Muscle Testing (MMT) Assessment BUE generally 5/5; L shoulder not MMT due to pain at baseline   Coordination   Upper Extremity Coordination No deficits were identified   Bed Mobility   Bed Mobility supine-sit   Supine-Sit Buhl (Bed Mobility) supervision   Transfers   Transfers sit-stand transfer;toilet transfer   Sit-Stand Transfer   Sit-Stand Buhl (Transfers) contact guard   Toilet Transfer   Type (Toilet Transfer) sit-stand;stand-sit   Buhl Level (Toilet Transfer) supervision   Balance   Balance Comments good seated; good ambulating in room without AD   Activities of Daily Living   BADL Assessment/Intervention lower body dressing;toileting   Lower Body Dressing Assessment/Training   Buhl Level (Lower Body Dressing) modified independence   Toileting   Buhl Level (Toileting) modified independence   Clinical Impression   Criteria for Skilled Therapeutic Interventions Met (OT) Yes, treatment indicated   OT Diagnosis impaired I/ADLs   OT Problem List-Impairments impacting ADL problems related to;balance;cognition;vision   Assessment of Occupational Performance 3-5 Performance Deficits   Identified Performance Deficits driving, home chores, med management, financial management   Planned Therapy Interventions (OT) cognition;home program guidelines   Clinical Decision Making Complexity (OT) low complexity   Risk & Benefits of therapy have been explained  evaluation/treatment results reviewed;care plan/treatment goals reviewed;participants included;patient;spouse/significant other;daughter   OT Discharge Planning   OT Discharge Recommendation (DC Rec) home with assist;home with outpatient occupational therapy   OT Rationale for DC Rec Pt close to baseline, limited by cognitive deficits beyond baseline.  He would benefit from continued therapy with OP OT to further evaluate and treat cognitive deficits, maximizing safety and independence with IADLs and driving.   OT Brief overview of current status Supervision toilet transfer, Kevin dressing, STM deficits   Total Evaluation Time (Minutes)   Total Evaluation Time (Minutes) 5

## 2022-05-11 NOTE — UTILIZATION REVIEW
"Admission Status; Secondary Review Determination     Admission Date: 5/10/2022 12:13 PM       Under the authority of the Utilization Management Committee, the utilization review process indicated a secondary review on the above patient.  The review outcome is based on review of the medical records, discussions with staff, and applying clinical experience noted on the date of the review.        (x)      Inpatient Status Appropriate - This patient's medical care is consistent with medical management for inpatient care and reasonable inpatient medical practice.       RATIONALE FOR DETERMINATION      Brief clinical presentation, information copied from the chart, abbreviated and edited for relevant content:     Leighton Morales is a 66 year old male with hx of seizure disorder, s/p resection of vascular malformation (1995) admitted on 5/10/2022 with dizziness. Prior history   -seen by neurology in 3/22 with complaints of \"vertigo attacks\" MRI of the head and MRA of the brain and neck at that time were unremarkable. His carbamazepine level was checked then and was very slightly elevated to 12.3. He now presented with light-headedness and balance issues. ED he was noted to be quite unsteady on his feet and mildly confused.  His carbamazepine level was found to be markedly elevated to 22.1. CT was negative for acute pathology. Plan to Hold Carbamazepine,  Monitor levels , Neurology consult , PT/OT.      At the time of admission with the information available to the attending physician, more than 2 nights hospital complex care was anticipated. Also, there was a risk of adverse outcome if patient was treated outpatient or observation. High intensity of services anticipated. Inpatient admission appropriate based on Medicare guidelines.       The information on this document is developed by the utilization review team in order for the business office to ensure compliance.  This only denotes the appropriateness of proper admission " status and does not reflect the quality of care rendered.         The definitions of Inpatient Status and Observation Status used in making the determination above are those provided in the CMS Coverage Manual, Chapter 1 and Chapter 6, section 70.4.      Sincerely,      Cece Fuentes MD   Utilization Review/ Case Management  [facility].

## 2022-05-11 NOTE — TELEPHONE ENCOUNTER
Chart reviewed.  Patient is currently inpatient at [facility].    Call placed to patient, voice message left suggesting that he talk with his providers at the hospital if it is a questions about his current care.  Call back number left in case he still needs to talk with us
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
## 2022-05-11 NOTE — PROGRESS NOTES
Pt was discharged. RN reviewed paperwork, answered questions and sent personal belongings with Pt.
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
## 2022-05-11 NOTE — PROGRESS NOTES
Pt is wanting to leave AMA. RN was unable to redirect. Hospitalist and Neurologist were notified.

## 2022-05-11 NOTE — PROGRESS NOTES
"[facility]    Medicine Progress Note - Hospitalist Service    Date of Admission:  5/10/2022    Assessment & Plan         Leighton Morales is a 66 year old male with hx of seizure disorder, s/p resection of vascular malformation (1995) admitted on 5/10/2022 with dizziness.     Episodes of dizziness and leg weakness   Elevated carbamazepine level   Seizure disorder since age 12- absence type   History of resection of vascular malformation   He has been having frequent episodes for many months.  These do sound neurologic and not definitely related to the elevated AED level.  He has been on his AED;'s for many years.      Continue to hold carbamazepine    Continue levetiracetam     Await neurology consultation     Cognitive evaluation     Overweight  Estimated body mass index is 27.51 kg/m  as calculated from the following:  eight as of 4/25/22: 1.765 m (5' 9.5\").  Weight as of 4/25/22: 85.7 kg (189 lb).     Hyperlipidemia    Hold PTA rosuvastatin for now      Diet: Combination Diet Regular Diet Adult    DVT Prophylaxis: Pneumatic Compression Devices  Pinedo Catheter: Not present  Central Lines: None  Cardiac Monitoring: ACTIVE order. Indication: Drug overdose (24 hours)  Code Status: Full Code      Disposition Plan   Expected Discharge: 05/12/2022     Anticipated discharge location:  Awaiting care coordination huddle  Delays:          The patient's care was discussed with the Bedside Nurse, Patient and Patient's Family.    Glen Peraza MD  Hospitalist Service  [facility]  Securely message with the Vocera Web Console (learn more here)  Text page via [employer] Paging/Directory     Clinically Significant Risk Factors Present on Admission          # Hypocalcemia: Ca = 8.2 mg/dL (Ref range: 8.5 - 10.1 mg/dL) and/or iCa = N/A on admission, will replace as needed         # Overweight: Estimated body mass index is 27.51 kg/m  as calculated from the following:    Height as of " "4/25/22: 1.765 m (5' 9.5\").    Weight as of 4/25/22: 85.7 kg (189 lb).      ______________________________________________________________________    Interval History   Feels fine this morning - history reviewed with the patient and his family.    Data reviewed today: I reviewed all medications, new labs and imaging results over the last 24 hours. I personally reviewed no images or EKG's today.    Physical Exam   Vital Signs: Temp: 97.8  F (36.6  C) Temp src: Oral BP: (!) 144/90 Pulse: 62   Resp: 18 SpO2: 98 % O2 Device: None (Room air)    Weight: 0 lbs 0 oz  Constitutional: awake, alert, cooperative, no apparent distress  Respiratory: No increased work of breathing, good air exchange, clear to auscultation bilaterally, no crackles or wheezing  Cardiovascular: regular rate and rhythm, normal S1 and S2, no S3 or S4, and no murmur noted  GI: normal bowel sounds, soft, non-distended, non-tender  Skin: no rashes and no jaundice  Neurologic: Awake, alert, oriented to name, place and time.  Cranial nerves II-XII are grossly intact.  No nystagmus or tremor.  Motor is 5 out of 5 bilaterally.  Cerebellar finger to nose, heel to shin intact.   Neuropsychiatric: General: normal, calm and normal eye contact    Data   Recent Labs   Lab 05/11/22  0833 05/11/22  0211 05/10/22  1237   WBC  --   --  4.3   HGB  --   --  14.2   MCV  --   --  92   PLT  --   --  141*     --  140   POTASSIUM 3.4  --  3.7   CHLORIDE 106  --  108   CO2 28  --  28   BUN 8  --  12   CR 0.74  --  0.90   ANIONGAP 5  --  4   DEREK 8.2*  --  8.6   * 93 113*   ALBUMIN  --   --  3.6   PROTTOTAL  --   --  7.2   BILITOTAL  --   --  0.8   ALKPHOS  --   --  53   ALT  --   --  49   AST  --   --  26     Recent Results (from the past 24 hour(s))   CT Head w/o Contrast    Narrative    CT SCAN OF THE HEAD WITHOUT CONTRAST   5/10/2022 2:16 PM     HISTORY: AMS, ataxia.    TECHNIQUE:  Axial images of the head and coronal reformations without  IV contrast material. " Radiation dose for this scan was reduced using  automated exposure control, adjustment of the mA and/or kV according  to patient size, or iterative reconstruction technique.    COMPARISON: MRI of the brain 3/18/2022.    FINDINGS: Again seen are findings of previous right-sided  parietotemporal craniotomy. Smooth thin curvilinear extra-axial soft  tissue density along the right cerebral convexity positioned directly  beneath the inner table of the calvarium, including underlying the  craniotomy flap, likely corresponding to previously seen diffuse right  convexity pachymeningeal thickening and enhancement on prior MRI. This  is probably chronic and reactive/postsurgical in nature. Unchanged  zone of gliosis/encephalomalacia involving much of the right temporal  lobe. The ventricles are normal in size and configuration. Mild  generalized brain parenchymal volume loss. No definite CT findings of  acute infarct, acute intracranial hemorrhage, or mass  effect/herniation.    Visualized orbits appear unremarkable. Visualized aspects of the  paranasal sinuses and mastoids are clear. No acute calvarial fracture  identified.      Impression    IMPRESSION:  1. No CT findings of acute intracranial process.  2. Unchanged findings of previous right parietotemporal craniotomy.  Unchanged encephalomalacia in the right temporal lobe.  3. Unchanged mild generalized brain parenchymal volume loss.    CECILIA ARAUJO MD         SYSTEM ID:  [id number]     Medications     sodium chloride 100 mL/hr at 05/11/22 0818       levETIRAcetam  1,500 mg Oral BID     sodium chloride (PF)  3 mL Intracatheter Q8H     tamsulosin  0.8 mg Oral Daily

## 2022-05-11 NOTE — CONSULTS
Care Management Initial Consult    General Information  Assessment completed with: Patient, Children, Anjum  Type of CM/SW Visit: Initial Assessment    Primary Care Provider verified and updated as needed: Yes   Readmission within the last 30 days: no previous admission in last 30 days      Reason for Consult: discharge planning      Communication Assessment  Patient's communication style: spoken language (English or Bilingual)    Hearing Difficulty or Deaf: no   Wear Glasses or Blind: yes    Cognitive  Cognitive/Neuro/Behavioral: WDL  Level of Consciousness: alert     Orientation: oriented x 4  Mood/Behavior: calm, cooperative  Best Language: 0 - No aphasia  Speech: clear    Living Environment:   People in home: alone     Current living Arrangements: house      Able to return to prior arrangements: yes     Family/Social Support:  Care provided by: self  Provides care for: no one  Marital Status:   Significant Other, Children, Parent(s)       Gi  Description of Support System: Supportive, Involved       Current Resources:   Patient receiving home care services: No     Community Resources: None  Equipment currently used at home: grab bar, tub/shower  Supplies currently used at home: None    Employment/Financial:  Employment Status: retired        Financial Concerns:     Referral to Financial Worker: No    Functional Status:  Prior to admission patient needed assistance:   Dependent ADLs:: Independent  Dependent IADLs:: Independent    Mental Health Status:  Mental Health Status: No Current Concerns       Chemical Dependency Status:  Chemical Dependency Status: No Current Concerns           Values/Beliefs:  Spiritual, Cultural Beliefs, Moravian Practices, Values that affect care: no               Additional Information:    RNCC met with pt and daughter Renuka at bedside. Pt lives alone in a home. Independent in all ADLs and IADLs. Confirmed Veum as PCP and follows Patton with neurology.  "Expressed some frustration with \"these episode\" and having found no answers as of yet despite \"tests and doctors.\" Does not utilize any community services, home care, or DME. Sites daughter, son, and significant other as support for him. Spoke of his father who he helps care for and who receives home care services. Retired without any pressing financial concerns.     Agrees to have CM team assist with scheduling follow-up appts at time of discharge. PCP f/u scheduled. Will need neurology f/u scheduled    RNCC will continue to follow for any other discharge needs.       Sarita Smith, RN, BSN, PHN, CMSRN  Care Coordination  [facility]  Phone [phone]       "

## 2022-05-11 NOTE — PROGRESS NOTES
05/11/22 0941   Quick Adds   Type of Visit Initial PT Evaluation   Living Environment   People in Home alone   Current Living Arrangements house  (rambler with basement)   Home Accessibility stairs to enter home   Number of Stairs, Main Entrance 3   Stair Railings, Main Entrance railings on both sides of stairs   Transportation Anticipated car, drives self;family or friend will provide   Living Environment Comments Patient lives in one level house with basement - 3 steps to enter with handrails. Tub shower with grab bars.   Self-Care   Usual Activity Tolerance good   Current Activity Tolerance moderate   Equipment Currently Used at Home none   Fall history within last six months yes   Number of times patient has fallen within last six months 6   Activity/Exercise/Self-Care Comment Patient reported being independent at baseline with mobility and cares - not utilizing assistive device.   General Information   Onset of Illness/Injury or Date of Surgery 05/10/22   Referring Physician Lisbeth Mathews   Patient/Family Therapy Goals Statement (PT) Patient would like to go home.   Pertinent History of Current Problem (include personal factors and/or comorbidities that impact the POC) 66 year old male with hx of seizure disorder, s/p resection of vascular malformation (1995) admitted on 5/10/2022 with dizziness.   Existing Precautions/Restrictions fall   General Observations Patient reported having intermittent episodes where numbness occurs in LE's and progresses to knees which then results in dizziness and reduced vision. Patient reports having fallen during these episodes and an inability to get up. Has these episodes quite frequently.   Cognition   Affect/Mental Status (Cognition) WFL   Orientation Status (Cognition) oriented x 4   Follows Commands (Cognition) WNL   Pain Assessment   Patient Currently in Pain No   Bed Mobility   Bed Mobility supine-sit;sit-supine   Supine-Sit Noble (Bed Mobility) supervision  "  Sit-Supine Wake (Bed Mobility) supervision   Comment, (Bed Mobility) Performing supine<>sit transfers with SBA - bed slightly elevated.   Transfers   Transfers sit-stand transfer   Sit-Stand Transfer   Sit-Stand Wake (Transfers) contact guard   Comment, (Sit-Stand Transfer) Performing sit<>stands with SBA-CGA and no assistive device hands on knees while performing   Gait/Stairs (Locomotion)   Wake Level (Gait) contact guard   Distance in Feet (Required for LE Total Joints) 10' eval + 225' treatment   Comment, (Gait/Stairs) Patient completed bout of ambulation x10' with CGA and no assistive device.   Balance   Balance Comments Slight instability with balance testing - will assess further at future sessions.   Sensory Examination   Sensory Perception Comments Impaired light touch sensation in bilateral feet - describes as \"numbness\"   Clinical Impression   Criteria for Skilled Therapeutic Intervention Yes, treatment indicated   PT Diagnosis (PT) Impaired mobility   Influenced by the following impairments generalized weakness, reduced activity tolerance, balance impairment   Functional limitations due to impairments impaired functional mobility   Clinical Presentation (PT Evaluation Complexity) Stable/Uncomplicated   Clinical Presentation Rationale Clinical judgement   Clinical Decision Making (Complexity) low complexity   Planned Therapy Interventions (PT) balance training;bed mobility training;gait training;progressive activity/exercise;transfer training;strengthening;stair training;patient/family education;neuromuscular re-education   Risk & Benefits of therapy have been explained evaluation/treatment results reviewed;care plan/treatment goals reviewed;risks/benefits reviewed;current/potential barriers reviewed;participants voiced agreement with care plan;participants included;spouse/significant other;patient;daughter   PT Discharge Planning   PT Discharge Recommendation (DC Rec) home with " assist   PT Rationale for DC Rec Patient mobilizing near baseline functional status at this time per reports from patient and family. Patient with SBA-CGA for all mobility at initial evaluation. Recommend that patient discharge home with supervision and assist as needed - also recommending regular check-ins. Patient may benefit from outpatient therapy for improvement of balance to reduce risk for falls - family reported falls related to episodes at this time.   Total Evaluation Time   Total Evaluation Time (Minutes) 13   Physical Therapy Goals   PT Frequency Daily   PT Predicted Duration/Target Date for Goal Attainment 05/16/22   PT Goals Bed Mobility;Transfers;Stairs;Gait   PT: Bed Mobility Independent;Supine to/from sit   PT: Transfers Independent;Sit to/from stand   PT: Gait Modified independent;Greater than 200 feet  (500')   PT: Stairs Modified independent;10 stairs;Rail on both sides

## 2022-05-12 ENCOUNTER — PATIENT OUTREACH (OUTPATIENT)
Dept: INTERNAL MEDICINE | Facility: CLINIC | Age: 67
End: 2022-05-12
Payer: COMMERCIAL

## 2022-05-12 LAB
LEVETIRACETAM SERPL-MCNC: 28 UG/ML
LEVETIRACETAM SERPL-MCNC: 34 UG/ML

## 2022-05-12 NOTE — PLAN OF CARE
Occupational Therapy Discharge Summary    Reason for therapy discharge:    Discharged to home with outpatient therapy.    Progress towards therapy goal(s). See goals on Care Plan in [facility] electronic health record for goal details.  Goals not met.  Barriers to achieving goals:   discharge on same date as initial evaluation.    Therapy recommendation(s):    Continued therapy is recommended.  Rationale/Recommendations:  Pt close to baseline, limited by cognitive deficits beyond baseline.  He would benefit from continued therapy with OP OT to further evaluate and treat cognitive deficits, maximizing safety and independence with IADLs and driving. Recommend A with IADl's to insure safety, ie med mgmt, shopping, cooking, etc.

## 2022-05-12 NOTE — PLAN OF CARE
Physical Therapy Discharge Summary    Reason for therapy discharge:    Discharged to home.    Progress towards therapy goal(s). See goals on Care Plan in [facility] electronic health record for goal details.  Goals not met.  Barriers to achieving goals:   Patient seen for eval only.    Therapy recommendation(s):    No further therapy is recommended.    Goal Outcome Evaluation:

## 2022-05-13 LAB — NMDAR IGG TITR SER IF: NORMAL {TITER}

## 2022-05-16 LAB
CARBAMAZEPINE EP SERPL-MCNC: 2.1 UG/ML
CARBAMAZEPINE SERPL-MCNC: 4.5 UG/ML

## 2022-05-17 LAB
AMPHIPHYSIN IGG TITR SER IF: NEGATIVE TITER
CV2 IGG TITR SER IF: NEGATIVE TITER
GLIAL NUC TYPE 1 IGG TITR SER IF: NEGATIVE TITER
HU1 IGG TITR SER IF: NEGATIVE TITER
HU2 IGG TITR SER IF: NEGATIVE TITER
HU3 IGG TITR SER IF: NEGATIVE TITER
LABORATORY COMMENT REPORT: NORMAL
PARANEOPLASTIC AB SER-IMP: NORMAL
PCA-1 IGG TITR SER IF: NEGATIVE TITER
PCA-2 IGG TITR SER IF: NEGATIVE TITER
PCA-TR IGG TITR SER IF: NEGATIVE TITER
VGCC-P/Q BIND IGG+IGM SER IA-SCNC: 0 NMOL/L
VGKC AB SER IA-SCNC: 0 NMOL/L

## 2022-05-19 ENCOUNTER — OFFICE VISIT (OUTPATIENT)
Dept: INTERNAL MEDICINE | Facility: CLINIC | Age: 67
End: 2022-05-19
Payer: COMMERCIAL

## 2022-05-19 VITALS
OXYGEN SATURATION: 100 % | SYSTOLIC BLOOD PRESSURE: 128 MMHG | DIASTOLIC BLOOD PRESSURE: 82 MMHG | HEART RATE: 71 BPM | WEIGHT: 193 LBS | TEMPERATURE: 97.1 F | HEIGHT: 70 IN | RESPIRATION RATE: 16 BRPM | BODY MASS INDEX: 27.63 KG/M2

## 2022-05-19 DIAGNOSIS — A63.0 GENITAL WARTS: ICD-10-CM

## 2022-05-19 DIAGNOSIS — E78.5 HYPERLIPIDEMIA LDL GOAL <100: ICD-10-CM

## 2022-05-19 DIAGNOSIS — G40.909 SEIZURE DISORDER (H): Primary | ICD-10-CM

## 2022-05-19 PROBLEM — R41.82 ALTERED MENTAL STATUS, UNSPECIFIED ALTERED MENTAL STATUS TYPE: Status: RESOLVED | Noted: 2022-05-10 | Resolved: 2022-05-19

## 2022-05-19 PROBLEM — T42.1X4A: Status: RESOLVED | Noted: 2022-05-10 | Resolved: 2022-05-19

## 2022-05-19 PROCEDURE — 54056 CRYOSURGERY PENIS LESION(S): CPT | Performed by: INTERNAL MEDICINE

## 2022-05-19 PROCEDURE — 99213 OFFICE O/P EST LOW 20 MIN: CPT | Mod: 25 | Performed by: INTERNAL MEDICINE
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
## 2022-05-19 NOTE — PATIENT INSTRUCTIONS
Continue current medications   Follow-up with Neurology as scheduled  Monitor penile lesions after liquid nitrogen treatment. If not resolving after a month , then will require additional treatment  Call  [phone] or use [employer] to schedule a future lab appointment  fasting in 1-2 weeks to follow-up on cholesterol management with use of rosuvastatin.   For fasting labs, please refrain from eating for 8 hours or more.   Drink 2 glasses of water before your lab appointment. It is fine to take your  oral medications on the morning of the lab test as usual  Use a weekly pill container to set up medications

## 2022-05-23 ENCOUNTER — THERAPY VISIT (OUTPATIENT)
Dept: PHYSICAL THERAPY | Facility: CLINIC | Age: 67
End: 2022-05-23
Payer: COMMERCIAL

## 2022-05-23 DIAGNOSIS — G89.29 CHRONIC LEFT SHOULDER PAIN: ICD-10-CM

## 2022-05-23 DIAGNOSIS — M25.512 CHRONIC LEFT SHOULDER PAIN: ICD-10-CM

## 2022-05-23 PROCEDURE — 97530 THERAPEUTIC ACTIVITIES: CPT | Mod: GP | Performed by: PHYSICAL THERAPIST

## 2022-05-23 PROCEDURE — 97110 THERAPEUTIC EXERCISES: CPT | Mod: GP | Performed by: PHYSICAL THERAPIST

## 2022-06-03 ENCOUNTER — OFFICE VISIT (OUTPATIENT)
Dept: NEUROLOGY | Facility: CLINIC | Age: 67
End: 2022-06-03
Payer: COMMERCIAL

## 2022-06-03 VITALS
DIASTOLIC BLOOD PRESSURE: 68 MMHG | TEMPERATURE: 97 F | HEIGHT: 71 IN | WEIGHT: 191.6 LBS | SYSTOLIC BLOOD PRESSURE: 142 MMHG | BODY MASS INDEX: 26.82 KG/M2

## 2022-06-03 DIAGNOSIS — G40.909 SEIZURE DISORDER (H): Primary | ICD-10-CM

## 2022-06-03 ASSESSMENT — PAIN SCALES - GENERAL: PAINLEVEL: NO PAIN (0)
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
## 2022-06-03 NOTE — PATIENT INSTRUCTIONS
Continue Levetiracetam 1500 mg am and 1500 mg pm      For now continue Carbamazepine  mg twice a day  (he has taken carbamazepine 800 mg twice a day for over 10 years). We need to make sure he has SAME mft to avoid level fluctuations     Check carbamazepine and levetiracetam  levels     3 hours Video EEG     Neuropsychology before 12.2022    Follow up  3 months      Mr. Morales requires same mft for carbamazepine. Recently, his carbamazepine level increased to 22 without increase in carbamazepine dose. He was taking carbamazepine 800 mg twice a day for over 10 years, yet, his levels increased. He had severe vertigo, imbalance, confusion, ER visit and hospitalization. To avoid deterioration and risk of falls and seizure we need to make sure carbamazepine mft is the same.     Please call pharmacy and evalute who the carbamazepine mft was for feb 2022-June 2022.       Carmen Patton MD

## 2022-06-03 NOTE — LETTER
Patient:  Leighton Morales  :   1955  MRN:     [id number]        Mr.James MADELEINE Morales  [address] [facility] [address]        2022    Dear ,    We are writing to inform you of your test results.    Your test results fall within the expected range(s) or remain unchanged from previous results.  Please continue with current treatment plan.    Resulted Orders   Carbamazepine Epoxide and Total   Result Value Ref Range    10, 11 Epoxide Level 2.1 ug/mL      Comment:      INTERPRETIVE INFORMATION:  Carbamazepine-10, 11-Epoxide    Carbamazepine-10, 11 Epoxide   Therapeutic Range: Not well established    Toxic:  Greater than 15.0 ug/mL    Total Carbamazepine   Therapeutic Range: 4.0-12.0 ug/mL    Toxic: Greater than 15.0 ug/mL    The therapeutic range is based on serum pre-dose (trough)   draw at steady-state concentration. The carbamazepine   metabolite, Carbamazepine-10, 11-Epoxide, has   anticonvulsant activity and a proposed therapeutic range of   0.4-4 ug/mL.    A rare, adverse drug reaction to carbamazepine therapy   includes Judge-Ranjeet syndrome or toxic epidermal   necrolysis. Patients of  ancestry with the presence of   the HLA-B*15:02 have an increased risk for this   carbamazepine-induced, life-threatening reaction.   Pharmacogenetic testing for HLA-B*15:02 is recommended   prior to treatment for patients at risk of carbamazepine   hypersensitivity. This information has been included in the   FDA-approved label for carbamazepine    (https://www.accessdata.fda.gov/scripts/cder/daf/index.cfm?e  vent=overview.process&kjwJekwBi=697937) and in the   guideline from the Clinical Pharmacogenetics Implementation   Consortium (https://www.pharmgkb.org/guidelines).   [HLA-B*15:02 Genotyping, Carbamazepine Hypersensitivity,   [facility] test code 4669296.]    A combination of therapeutic drug monitoring with   HLA-B*15:02 pharmacogenetics genotyping may benefit   patients at increased risk of  developing   carbamazepine-induced adverse events due to rare genotypes   other than the HLA-B*15:02 variant allele.    This test was developed and its performance characteristics   determined by [facility]. It has not been cleared or   approved by the [place] Food and Drug Administration. This test   was performed in a CLIA certified laboratory and is   intended for clinical purposes.    Carbamazepine Total Level 10.7 4.0 - 12.0 ug/mL      Comment:      Performed By: [facility]  [address] [place], [place] [id number]  : Hellen Canales MD   Keppra (Levetiracetam) Level   Result Value Ref Range    Keppra (Levetiracetam) Level 34 10 - 40 ug/mL      Comment:      INTERPRETIVE INFORMATION: Keppra (Levetiracetam)    Therapeutic Range:  10-40 ug/mL              Toxic:  Not well Established    Pharmacokinetics of levetiracetam are affected by renal   function. Adverse effects may include somnolence, weakness,   headache and vomiting.    This levetiracetam (Keppra) immunoassay uses the [employer]   Diagnostics reagents, which has known cross-reactivity with   the drug brivaracetam (Briviact) and may report inaccurate   results. Patients transitioning from levetiracetam to   brivaracetam or those who are using both medications should   not monitor drug concentrations with the [employer]   assay. These patients should be monitored using a validated   chromatographic methodology that distinguishes between   drugs to determine drug concentrations.  Performed By: [facility]  [address] [place], [place] [id number]  : MD Carmen Ambrosio MD               [id number]  1955
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
## 2022-06-05 LAB — LEVETIRACETAM SERPL-MCNC: 34 UG/ML

## 2022-06-07 LAB
CARBAMAZEPINE EP SERPL-MCNC: 2.1 UG/ML
CARBAMAZEPINE SERPL-MCNC: 10.7 UG/ML

## 2022-06-21 ENCOUNTER — THERAPY VISIT (OUTPATIENT)
Dept: PHYSICAL THERAPY | Facility: CLINIC | Age: 67
End: 2022-06-21
Payer: COMMERCIAL

## 2022-06-21 DIAGNOSIS — G89.29 CHRONIC LEFT SHOULDER PAIN: Primary | ICD-10-CM

## 2022-06-21 DIAGNOSIS — M25.512 CHRONIC LEFT SHOULDER PAIN: Primary | ICD-10-CM

## 2022-06-21 PROCEDURE — 97110 THERAPEUTIC EXERCISES: CPT | Mod: GP | Performed by: PHYSICAL THERAPIST

## 2022-06-21 PROCEDURE — 97530 THERAPEUTIC ACTIVITIES: CPT | Mod: GP | Performed by: PHYSICAL THERAPIST

## 2022-06-21 NOTE — PROGRESS NOTES
PROGRESS  REPORT    Progress reporting period is from 4-29-22 to 6-21-22. Pt has completed 3 sessions of PT. Interupted in therapy from 5-23-22 to 6-21-22.       SUBJECTIVE  Subjective changes noted by patient:  Continues to note pain with specific movements of the arm. Issues with dressing, lying on the shoulder. Not lifting any weights at health club.    Current pain level is up to 8/10.  Previous pain level was  8/10.   Changes in function:  No changes in function.  Adverse reaction to treatment or activity: treatment - reported increased pain following HEP.    OBJECTIVE  Objective: AROM: flexion: R 130 degrees; L: 125 degrees, pain limited. PROM: R flexion: 150 degrees;  L flexion: ~120 pain limited; ER: ~60, ERP. MMT: 5/5 without pain: IR/ER/ABD.     ASSESSMENT/PLAN  Updated problem list and treatment plan: Diagnosis 1:  L shoulder pain  Pain -  manual therapy, self management, education and home program  Decreased ROM/flexibility - manual therapy and therapeutic exercise  Decreased joint mobility - manual therapy and therapeutic exercise  Decreased function - therapeutic activities  STG/LTGs have been met or progress has been made towards goals:  Goals ongoing.  Assessment of Progress: The patient's condition is unchanged. Two visits completed over a 2 month period; difficult to assess response to treatment secondary to limited attendance.  Self Management Plans:  Patient has been instructed in a home treatment program.  Leighton continues to require the following intervention to meet STG and LTG's:  PT    Recommendations:  This patient would benefit from continued therapy.     Frequency:  1 X week, once daily  Duration:  Every 2 weeks over 2 months.        Please refer to the daily flowsheet for treatment today, total treatment time and time spent performing 1:1 timed codes.

## 2022-06-21 NOTE — PROGRESS NOTES
[facility]    OUTPATIENT Physical Therapy ORTHOPEDIC EVALUATION  PLAN OF TREATMENT FOR OUTPATIENT REHABILITATION  (COMPLETE FOR INITIAL CLAIMS ONLY)  Patient's Last Name, First Name, M.I.  YOB: 1955  Leighton Morales    Provider s Name:  [facility]   Medical Record No.  [id number]   Start of Care Date:  04/29/22   Onset Date:    (MD office visit 4-25-22)   Type:     _X__PT   ___OT Medical Diagnosis:    Encounter Diagnosis   Name Primary?    Chronic left shoulder pain Yes        Treatment Diagnosis:  L shoulder pain        Goals:     06/21/22 0500   Body Part   Goals listed below are for L shoulder   Goal #1   Goal #1 reaching   Previous Functional Level No restrictions   Current Functional Level Can reach ;overhead   Performance level pain level up to 8/10   STG Target Performance Reach ;overhead   Performance level pain level <5/10   Rationale for independent personal hygiene;for dressing;for bathing;for meal preparation   Due date 07/19/22  (goal date revised)   If goal not met, Why? interupted therapy   LTG Target Performance Reach;overhead   Performance Level pain level <2/10   Rationale for independent personal hygiene;for dressing;for bathing;for meal preparation   Due date 08/02/22  (goal date revised)   If goal not met, Why? interupted attendance       Therapy Frequency:  1x/week  Predicted Duration of Therapy Intervention:  6 weeks    Monserrat Leggett, PT                 I CERTIFY THE NEED FOR THESE SERVICES FURNISHED UNDER        THIS PLAN OF TREATMENT AND WHILE UNDER MY CARE     (Physician attestation of this document indicates review and certification of the therapy plan).                     Certification Date From:  06/11/22   Certification Date To:  08/02/22    Referring Provider:  Los Mcelroy    Initial Assessment        See Epic Evaluation SOC Date:  04/29/22

## 2022-06-30 ENCOUNTER — TELEPHONE (OUTPATIENT)
Dept: NEUROLOGY | Facility: CLINIC | Age: 67
End: 2022-06-30

## 2022-06-30 NOTE — TELEPHONE ENCOUNTER
Patient called office to say he was pulled over few days ago and informed his License was suspended due to form not having been completed. Patient has mailed form to clinic(not arrived yet) and is hoping this can processed asap.

## 2022-07-14 ENCOUNTER — THERAPY VISIT (OUTPATIENT)
Dept: PHYSICAL THERAPY | Facility: CLINIC | Age: 67
End: 2022-07-14
Payer: COMMERCIAL

## 2022-07-14 DIAGNOSIS — M25.512 CHRONIC LEFT SHOULDER PAIN: Primary | ICD-10-CM

## 2022-07-14 DIAGNOSIS — G89.29 CHRONIC LEFT SHOULDER PAIN: Primary | ICD-10-CM

## 2022-07-14 PROCEDURE — 97110 THERAPEUTIC EXERCISES: CPT | Mod: GP | Performed by: PHYSICAL THERAPIST

## 2022-07-18 ENCOUNTER — ANCILLARY PROCEDURE (OUTPATIENT)
Dept: NEUROLOGY | Facility: CLINIC | Age: 67
End: 2022-07-18
Attending: PSYCHIATRY & NEUROLOGY
Payer: COMMERCIAL

## 2022-07-18 DIAGNOSIS — G40.909 SEIZURE DISORDER (H): ICD-10-CM

## 2022-07-27 ENCOUNTER — OFFICE VISIT (OUTPATIENT)
Dept: NEUROLOGY | Facility: CLINIC | Age: 67
End: 2022-07-27
Attending: PSYCHIATRY & NEUROLOGY
Payer: COMMERCIAL

## 2022-07-27 DIAGNOSIS — F06.8 OTHER SPECIFIED MENTAL DISORDERS DUE TO KNOWN PHYSIOLOGICAL CONDITION: ICD-10-CM

## 2022-07-27 DIAGNOSIS — G40.909 SEIZURE DISORDER (H): ICD-10-CM

## 2022-07-27 DIAGNOSIS — R41.844 FRONTAL LOBE AND EXECUTIVE FUNCTION DEFICIT: ICD-10-CM

## 2022-07-27 DIAGNOSIS — G31.84 MILD COGNITIVE IMPAIRMENT, SO STATED: Primary | ICD-10-CM

## 2022-07-27 NOTE — PROGRESS NOTES
Name: Leighton Morales MRN: [id number]  : 1955  GORE: 2022  Staff: KWADWO Tech:  Age: 66  Sex: Male Hand: Left Educ: 16  Vision: 20/30 ?with correction / ?without correction    ORIENTATION     Time  -0     Place       Personal info         WAIS-IV   MILENA: 81     WMI: 100       Raw SSa     Similarities  23 9     Block Design  20 6     Matrix Reasoning 9 7     Digit Span  25 10 RDS= 9     Arithmetic  14 10     Coding  28 5    AVLT      Trial 1 2 3 4 5 B 6             4 5 7 8 7 4 2      Raw %ile     Learning Over Trials (1-5) 31     Short Delay Recall  2 2     30  Recall   3 4-10     30  Recognition Hits  7 <2     30  False Positives  9     CARIDAD-O    Raw  T %ile     Copy    23.5     ?1      Short Delay Recall 9.0 38 12     Long Delay Recall 9.5 38 12     Recognition Total 15 <20 <1     Time to Copy  348        COWAT (FAS)   Raw: 29   T: 41    Animals   Raw:  13  T-score:  33    BOSTON NAMING TEST   Raw: 45   %ile 4    COMPLEX IDEATIONAL MATERIAL   Raw: 10/12 T: 32    TRAILS  Raw  Err %ile    A 44  0 29   B 178  1 <4    STROOP Raw %ile   Word 58 <4   Color  48 4       Color/Word  32 41-54    KEN   Raw: 20  %ile: 15-17    MCDOWELL FACIAL RECOGNITION   Raw: 37  Interp: Moderate    GROOVED PEGBOARD    Raw  T Drops   RH  81  51 0   LH 83  43 0    BDI-II  Raw:  0  Interpretation: Minimal    PRITESH  Raw:  0  Interpretation: Minimal    WMS-IV  Raw SS / %ile     LM I  24 10     LM II  15 8     LM Recog. 26  >75   WMS-III       Faces I 30 7     Faces II 32 8     Spatial Span 10 6    DCT E-score: 16

## 2022-07-27 NOTE — PROGRESS NOTES
Patient was seen for neuropsychological evaluation at the request of Dr. Carmen Patton, for the purposes of diagnostic clarification and treatment planning.  3 hrs 13 min of test administration and scoring were provided by this writer, Gretchen Singh.  Please see Dr. Porter Bains's report for a full interpretation of the findings.
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
## 2022-08-02 DIAGNOSIS — E78.2 MIXED HYPERLIPIDEMIA: Primary | ICD-10-CM

## 2022-08-03 NOTE — TELEPHONE ENCOUNTER
Routing refill request to provider for review/approval because:  Medication is reported/historical    Robb Puentes RN  [facility] Triage Nurse

## 2022-08-04 RX ORDER — ROSUVASTATIN CALCIUM 20 MG/1
20 TABLET, COATED ORAL DAILY
Qty: 30 TABLET | Refills: 0
Start: 2022-08-04 | End: 2022-08-24

## 2022-08-04 NOTE — TELEPHONE ENCOUNTER
Patient was prescribed rosuvastatin at April 2022 visit.  He was instructed to have fasting labs 1 month later but never did.  Please call patient and get a fasting lab appointment scheduled in the next week.  Medication previously sent to [facility] pharmacy and had refills available.  Therefore, patient should be able to refill medication now still at [facility] for 30-day supply.  In future labs at goal, will then consider changing to long-term prescription through mail order pharmacy.

## 2022-08-04 NOTE — TELEPHONE ENCOUNTER
Called patient, let him know of providers message. Patient has scheduled fasting   lab appointment for 8/6.     Patient did express a little confusion, stated he is currently getting all of his prescriptions refilled through mail order.

## 2022-08-06 ENCOUNTER — LAB (OUTPATIENT)
Dept: LAB | Facility: CLINIC | Age: 67
End: 2022-08-06
Payer: COMMERCIAL

## 2022-08-06 DIAGNOSIS — E78.5 HYPERLIPIDEMIA LDL GOAL <100: ICD-10-CM

## 2022-08-06 PROCEDURE — 82550 ASSAY OF CK (CPK): CPT

## 2022-08-06 PROCEDURE — 80061 LIPID PANEL: CPT

## 2022-08-06 PROCEDURE — 80076 HEPATIC FUNCTION PANEL: CPT

## 2022-08-06 PROCEDURE — 36415 COLL VENOUS BLD VENIPUNCTURE: CPT

## 2022-08-07 LAB
ALBUMIN SERPL-MCNC: 3.9 G/DL (ref 3.4–5)
ALP SERPL-CCNC: 48 U/L (ref 40–150)
ALT SERPL W P-5'-P-CCNC: 39 U/L (ref 0–70)
AST SERPL W P-5'-P-CCNC: 23 U/L (ref 0–45)
BILIRUB DIRECT SERPL-MCNC: 0.2 MG/DL (ref 0–0.2)
BILIRUB SERPL-MCNC: 0.5 MG/DL (ref 0.2–1.3)
CHOLEST SERPL-MCNC: 227 MG/DL
CK SERPL-CCNC: 130 U/L (ref 30–300)
FASTING STATUS PATIENT QL REPORTED: YES
HDLC SERPL-MCNC: 64 MG/DL
LDLC SERPL CALC-MCNC: 137 MG/DL
NONHDLC SERPL-MCNC: 163 MG/DL
PROT SERPL-MCNC: 7.3 G/DL (ref 6.8–8.8)
TRIGL SERPL-MCNC: 131 MG/DL

## 2022-08-17 DIAGNOSIS — E78.5 HYPERLIPIDEMIA LDL GOAL <100: Primary | ICD-10-CM

## 2022-08-19 NOTE — TELEPHONE ENCOUNTER
Patient was started on rosuvastatin in late April.  He was seen in clinic 5/13/2022 and instructed to schedule a fasting lab appointment in the next 1 to 2 weeks.  Patient did not do so.  Patient then requested a refill of rosuvastatin on 8/2/2022 which was filled for 30 days to cover him until he had labs done.  Patient did have fasting labs done on 8/6/2022 with elevated cholesterol numbers which were worse than previous which makes me think that he has not been taking rosuvastatin or at least not in the time that he had a labs drawn a couple weeks ago.  Please call patient to find out what is going on regarding his use of rosuvastatin and whether he is taking the medication currently and whether he was taking when he had labs drawn 2 weeks ago and then route back to me to review further

## 2022-08-19 NOTE — TELEPHONE ENCOUNTER
Can we increase this to a 90 day supply??    LDL Cholesterol Calculated   Date Value Ref Range Status   08/06/2022 137 (H) <=100 mg/dL Final   01/22/2021 108 (H) <100 mg/dL Final     Comment:     Above desirable:  100-129 mg/dl  Borderline High:  130-159 mg/dL  High:             160-189 mg/dL  Very high:       >189 mg/dl        3. Hyperlipidemia LDL goal <100  Patient changed to rosuvastatin 1 month ago.  Due for follow-up fasting lipids.  We will schedule appointment in the next 1 to 2 weeks (from visit 5-19-22)    Please refill as appropriate.  Thank you,    Estela Byers RN  [facility]

## 2022-08-22 NOTE — TELEPHONE ENCOUNTER
Patient was called, he was taking pravastatin 80 mg daily all up until now (might have only missed 1-2 days). He has never taken rosuvastatin because it was sent to [email or web address] and he does not use [email or web address], he uses [facility]. I don't see any communication about resending Rx to [facility].     Does Dr. Mcelroy want patient to try rosuvastatin for 3 months or so and recheck labs then? Would need a new Rx sent to [facility].

## 2022-08-24 RX ORDER — ROSUVASTATIN CALCIUM 20 MG/1
20 TABLET, COATED ORAL DAILY
Qty: 90 TABLET | Refills: 0 | Status: SHIPPED | OUTPATIENT
Start: 2022-08-24 | End: 2022-11-20

## 2022-08-24 NOTE — TELEPHONE ENCOUNTER
Relayed providers message to patient. Patient verbalized understanding and agrees to schedule fasting lab-only appt 10/5.

## 2022-08-24 NOTE — TELEPHONE ENCOUNTER
Prescription has been sent to [facility] as I normally will use a local pharmacy when for starting a new medication.  However patient wants to only use [facility], that is fine.  Patient to stop pravastatin  Prescription sent to [facility] for rosuvastatin 20 mg tablet, 1 tablet daily for cholesterol management.  Patient start rosuvastatin as soon as it arrives from [facility]  Patient to have fasting labs repeated in 5-6 weeks to recheck cholesterol, liver, CK labs with use of rosuvastatin.  Future lab ordered.  Assist patient in scheduling lab appointment

## 2022-09-12 ENCOUNTER — OFFICE VISIT (OUTPATIENT)
Dept: NEUROLOGY | Facility: CLINIC | Age: 67
End: 2022-09-12
Payer: COMMERCIAL

## 2022-09-12 VITALS
WEIGHT: 192.4 LBS | HEART RATE: 65 BPM | SYSTOLIC BLOOD PRESSURE: 169 MMHG | DIASTOLIC BLOOD PRESSURE: 90 MMHG | HEIGHT: 71 IN | BODY MASS INDEX: 26.94 KG/M2 | TEMPERATURE: 97.6 F

## 2022-09-12 DIAGNOSIS — R56.9 CONVULSIONS, UNSPECIFIED CONVULSION TYPE (H): Primary | ICD-10-CM
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
## 2022-09-12 NOTE — PATIENT INSTRUCTIONS
Continue Levetiracetam 1500 mg am and 1500 mg pm      Continue Carbamazepine  mg twice a day  (1 tablet twice a day)     Check carbamazepine and levetiracetam  levels     3 hours Video EEG     Neuropsychology before 12.2022    Follow up  6 months      Please call pharmacy and evalute who the carbamazepine mft was for Feb - May 2022 (we can add this to your adverse reaction list)     Carmen Patton MD

## 2022-10-03 PROBLEM — M25.512 CHRONIC LEFT SHOULDER PAIN: Status: RESOLVED | Noted: 2022-05-23 | Resolved: 2022-10-03

## 2022-10-03 PROBLEM — G89.29 CHRONIC LEFT SHOULDER PAIN: Status: RESOLVED | Noted: 2022-05-23 | Resolved: 2022-10-03

## 2022-10-03 NOTE — PROGRESS NOTES
"DISCHARGE REPORT    Progress reporting period is from 4-29-22- to 7-14-22. Pt completed 4 sessions over a 2.5 month time period. .       SUBJECTIVE  Subjective changes noted by patient: Reports sx's about the same. Reports performing his HEP but not sure if performing correctly.    Current pain level is up to 8/10.     Previous pain level was up 8/10.   Changes in function:  None  Adverse reaction to treatment or activity: ongoing issues with certain movements of the L UE.    OBJECTIVE  Objective: AROM: flexion: R 138 degrees; L 118 degrees-pain limited. Ext/IR: R-lower thoracic spine; L-lower lumbar spine with pain.     ASSESSMENT/PLAN  Updated problem list and treatment plan: Diagnosis 1:  L shoulder pain.    STG/LTGs have been met or progress has been made towards goals:  None  Assessment of Progress: The patient's condition is unchanged.  Self Management Plans:  Patient has been instructed in a home treatment program; unclear if memory issues-pt repeated at each visit if \"I am doing them correctly.\"  Leighton continues to require the following intervention to meet STG and LTG's:  No follow-up visits scheduled after last appt.    Recommendations:  Discharge.    Please refer to the daily flowsheet for treatment today, total treatment time and time spent performing 1:1 timed codes.        "

## 2022-10-05 ENCOUNTER — LAB (OUTPATIENT)
Dept: LAB | Facility: CLINIC | Age: 67
End: 2022-10-05
Payer: COMMERCIAL

## 2022-10-05 DIAGNOSIS — E78.5 HYPERLIPIDEMIA LDL GOAL <100: ICD-10-CM

## 2022-10-05 LAB
ALBUMIN SERPL-MCNC: 3.9 G/DL (ref 3.4–5)
ALP SERPL-CCNC: 50 U/L (ref 40–150)
ALT SERPL W P-5'-P-CCNC: 31 U/L (ref 0–70)
AST SERPL W P-5'-P-CCNC: 22 U/L (ref 0–45)
BILIRUB DIRECT SERPL-MCNC: 0.2 MG/DL (ref 0–0.2)
BILIRUB SERPL-MCNC: 0.7 MG/DL (ref 0.2–1.3)
CHOLEST SERPL-MCNC: 156 MG/DL
CK SERPL-CCNC: 206 U/L (ref 30–300)
FASTING STATUS PATIENT QL REPORTED: YES
HDLC SERPL-MCNC: 76 MG/DL
LDLC SERPL CALC-MCNC: 60 MG/DL
NONHDLC SERPL-MCNC: 80 MG/DL
PROT SERPL-MCNC: 7.6 G/DL (ref 6.8–8.8)
TRIGL SERPL-MCNC: 100 MG/DL

## 2022-10-05 PROCEDURE — 36415 COLL VENOUS BLD VENIPUNCTURE: CPT

## 2022-10-05 PROCEDURE — 80061 LIPID PANEL: CPT

## 2022-10-05 PROCEDURE — 82550 ASSAY OF CK (CPK): CPT

## 2022-10-05 PROCEDURE — 80076 HEPATIC FUNCTION PANEL: CPT

## 2022-10-22 ENCOUNTER — HEALTH MAINTENANCE LETTER (OUTPATIENT)
Age: 67
End: 2022-10-22

## 2022-11-18 DIAGNOSIS — E78.5 HYPERLIPIDEMIA LDL GOAL <100: ICD-10-CM

## 2022-11-20 RX ORDER — ROSUVASTATIN CALCIUM 20 MG/1
20 TABLET, COATED ORAL DAILY
Qty: 90 TABLET | Refills: 3 | Status: SHIPPED | OUTPATIENT
Start: 2022-11-20 | End: 2023-11-06

## 2023-03-01 DIAGNOSIS — G40.119 PARTIAL EPILEPSY WITH INTRACTABLE EPILEPSY (H): ICD-10-CM

## 2023-03-01 DIAGNOSIS — G40.909 SEIZURE SYNDROME (H): ICD-10-CM

## 2023-03-01 DIAGNOSIS — G40.909 SEIZURE DISORDER (H): ICD-10-CM

## 2023-03-04 RX ORDER — LEVETIRACETAM 1000 MG/1
1500 TABLET ORAL 2 TIMES DAILY
Qty: 270 TABLET | Refills: 2 | Status: SHIPPED | OUTPATIENT
Start: 2023-03-04 | End: 2023-11-30

## 2023-03-04 RX ORDER — CARBAMAZEPINE 400 MG/1
800 TABLET, EXTENDED RELEASE ORAL 2 TIMES DAILY
Qty: 360 TABLET | Refills: 2 | Status: SHIPPED | OUTPATIENT
Start: 2023-03-04 | End: 2023-03-13

## 2023-03-04 NOTE — TELEPHONE ENCOUNTER
LEVETIRACETAM TABS 1000MG  Last Written Prescription Date:   3/7/2022  Last Fill Quantity: 270,   # refills: 3  Last Office Visit :  9/12/2022  Future Office visit:   3/13/2023  Refer to Provider for review       CARBAMAZEPINE ER TABS 400MG  Last Written Prescription Date:   5/11/2022  Last Fill Quantity: 360,   # refills: 3  Last Office Visit :  9/12/2022  Future Office visit:   3/13/2023  Orders do not match.  Last filled by [facility] and set up on No print out.   Needing new orders by updated provider.    Misty Richmond RN  Central Triage Red Flags/Med Refills

## 2023-03-10 ENCOUNTER — TELEPHONE (OUTPATIENT)
Dept: NEUROLOGY | Facility: CLINIC | Age: 68
End: 2023-03-10

## 2023-03-10 NOTE — TELEPHONE ENCOUNTER
Chart reviewed.    Information found as follows:  Lamotrigine started 6/1995, discontinued ??, max dose 600mg/d, ineffective  Divalproex 1989 - 1991 failed side effects without efficacy  Dilantin 1980 - 1984, failed, efficacy  Mysoline 1963 - 1974, 1991 - 1996 Failed, efficacy  Gabapentin ~1999 - 2000, failed side effects  Carbamazepine 7/1984, side effects with resolution following switch to extended release versions.    [facility] placed to [facility], spoke with Sandra SANDOVAL  Information provided    Approval for Carbamazepine extended release given  1/1/2023 - 12/31/2023  Case# [id number]

## 2023-03-10 NOTE — TELEPHONE ENCOUNTER
Prior Authorization Retail Medication Request    Medication/Dose: Carbamazepine ER 400MG  ICD code (if different than what is on RX):    Previously Tried and Failed:  See Chart  Rationale:  See Chart    Insurance Name:    Insurance ID:        Pharmacy Information (if different than what is on RX)  Name:    Phone:

## 2023-03-10 NOTE — TELEPHONE ENCOUNTER
What is the concern that needs to be addressed by a nurse? Aide from [employer] called in today for a prior authorization for patient medication, Aide also states that she the authorization expires today at 3:45pm.     Name: Aide   Case number :[id number]  Call back number :[phone]    May a detailed message be left on voicemail? yes        Message routed to: p me mincep

## 2023-03-13 ENCOUNTER — OFFICE VISIT (OUTPATIENT)
Dept: NEUROLOGY | Facility: CLINIC | Age: 68
End: 2023-03-13
Payer: COMMERCIAL

## 2023-03-13 VITALS
SYSTOLIC BLOOD PRESSURE: 128 MMHG | BODY MASS INDEX: 26.46 KG/M2 | TEMPERATURE: 98.6 F | WEIGHT: 189 LBS | HEIGHT: 71 IN | DIASTOLIC BLOOD PRESSURE: 82 MMHG | HEART RATE: 78 BPM

## 2023-03-13 DIAGNOSIS — G40.909 SEIZURE SYNDROME (H): ICD-10-CM

## 2023-03-13 DIAGNOSIS — G40.119 PARTIAL EPILEPSY WITH INTRACTABLE EPILEPSY (H): ICD-10-CM

## 2023-03-13 LAB
LEVETIRACETAM SERPL-MCNC: 33 ΜG/ML (ref 10–40)
SODIUM SERPL-SCNC: 139 MMOL/L (ref 136–145)

## 2023-03-13 PROCEDURE — 80177 DRUG SCRN QUAN LEVETIRACETAM: CPT | Performed by: PSYCHIATRY & NEUROLOGY

## 2023-03-13 PROCEDURE — 84295 ASSAY OF SERUM SODIUM: CPT | Performed by: PSYCHIATRY & NEUROLOGY

## 2023-03-13 PROCEDURE — 80161 ASY CARBAMAZEPIN 10,11-EPXID: CPT | Performed by: PSYCHIATRY & NEUROLOGY

## 2023-03-13 RX ORDER — CARBAMAZEPINE 400 MG/1
800 TABLET, EXTENDED RELEASE ORAL 2 TIMES DAILY
Qty: 360 TABLET | Refills: 2 | Status: SHIPPED | OUTPATIENT
Start: 2023-03-13 | End: 2024-01-25

## 2023-03-13 ASSESSMENT — PAIN SCALES - GENERAL: PAINLEVEL: NO PAIN (0)
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
## 2023-03-13 NOTE — PATIENT INSTRUCTIONS
PLAN:       Continue Levetiracetam 1500 mg am and 1500 mg pm      Continue Carbamazepine  mg twice a day  (1 tablet twice a day) - Mr. Morales requires same mft (RISING) for carbamazepine.     Follow up 8 months      We need to complete prescription waiver for [id number]. Please call [facility] what paper work is required for MICKI nurse.      Talk  to primary care provider  - There are no Advance Care Planning documents on file. He states he gave one to [facility].    PA justification: Mr. Morales requires same mft for carbamazepine. Recently, his carbamazepine level increased to 22 without increase in carbamazepine dose. He was taking carbamazepine 800 mg twice a day for over 10 years, yet, his levels increased. He had severe vertigo, imbalance, confusion, ER visit and hospitalization. To avoid deterioration and risk of falls and seizure we need to make sure carbamazepine mft is the same.     Carmen Patton MD

## 2023-03-15 NOTE — TELEPHONE ENCOUNTER
Prior Authorization Not Needed per Insurance         Medication: Carbamazepine ER 400MG- PA NOT NEEDED   Insurance Company: [employer] - Phone [phone] Fax [phone]  Expected CoPay:      Pharmacy Filling the Rx: [facility] - [address]  Pharmacy Notified: No  Patient Notified: Yes    Called patient and patient stated that he received notice on medication coverage from insurance and also has received medication on 3/13/2023. Insurance also stated that PA is Not Needed and medication is covered. As Noted below, PA Approved with Effective Dates: 1/1/2023-12/31/2023 Case # [id number].

## 2023-03-20 LAB
CARBAMAZEPINE EP SERPL-MCNC: 0.8 UG/ML
CARBAMAZEPINE SERPL-MCNC: 6.6 UG/ML

## 2023-04-20 ENCOUNTER — PATIENT OUTREACH (OUTPATIENT)
Dept: CARE COORDINATION | Facility: CLINIC | Age: 68
End: 2023-04-20
Payer: COMMERCIAL

## 2023-06-01 ENCOUNTER — HEALTH MAINTENANCE LETTER (OUTPATIENT)
Age: 68
End: 2023-06-01

## 2023-11-06 ENCOUNTER — OFFICE VISIT (OUTPATIENT)
Dept: INTERNAL MEDICINE | Facility: CLINIC | Age: 68
End: 2023-11-06
Payer: COMMERCIAL

## 2023-11-06 ENCOUNTER — APPOINTMENT (OUTPATIENT)
Dept: MRI IMAGING | Facility: CLINIC | Age: 68
End: 2023-11-06
Attending: NURSE PRACTITIONER
Payer: COMMERCIAL

## 2023-11-06 ENCOUNTER — HOSPITAL ENCOUNTER (OUTPATIENT)
Dept: NEUROLOGY | Facility: CLINIC | Age: 68
Setting detail: OBSERVATION
Discharge: HOME | End: 2023-11-06
Attending: NURSE PRACTITIONER
Payer: COMMERCIAL

## 2023-11-06 ENCOUNTER — HOSPITAL ENCOUNTER (OUTPATIENT)
Facility: CLINIC | Age: 68
Setting detail: OBSERVATION
Discharge: HOME | End: 2023-11-07
Attending: EMERGENCY MEDICINE | Admitting: INTERNAL MEDICINE
Payer: COMMERCIAL

## 2023-11-06 VITALS
HEART RATE: 72 BPM | OXYGEN SATURATION: 97 % | TEMPERATURE: 97.2 F | HEIGHT: 71 IN | WEIGHT: 185.7 LBS | BODY MASS INDEX: 26 KG/M2 | DIASTOLIC BLOOD PRESSURE: 71 MMHG | SYSTOLIC BLOOD PRESSURE: 123 MMHG

## 2023-11-06 DIAGNOSIS — E78.5 HYPERLIPIDEMIA LDL GOAL <100: ICD-10-CM

## 2023-11-06 DIAGNOSIS — D69.6 THROMBOCYTOPENIA (H): ICD-10-CM

## 2023-11-06 DIAGNOSIS — Z12.5 SCREENING FOR PROSTATE CANCER: ICD-10-CM

## 2023-11-06 DIAGNOSIS — Z00.00 MEDICARE ANNUAL WELLNESS VISIT, SUBSEQUENT: ICD-10-CM

## 2023-11-06 DIAGNOSIS — R41.0 CONFUSION: ICD-10-CM

## 2023-11-06 DIAGNOSIS — R27.0 ATAXIA: ICD-10-CM

## 2023-11-06 DIAGNOSIS — Z23 NEEDS FLU SHOT: ICD-10-CM

## 2023-11-06 DIAGNOSIS — Z23 NEED FOR COVID-19 VACCINE: ICD-10-CM

## 2023-11-06 DIAGNOSIS — R56.9 CONVULSIONS, UNSPECIFIED CONVULSION TYPE (H): ICD-10-CM

## 2023-11-06 DIAGNOSIS — N52.9 ERECTILE DYSFUNCTION, UNSPECIFIED ERECTILE DYSFUNCTION TYPE: ICD-10-CM

## 2023-11-06 LAB
ALBUMIN SERPL BCG-MCNC: 4.3 G/DL (ref 3.5–5.2)
ALBUMIN UR-MCNC: NEGATIVE MG/DL
ALP SERPL-CCNC: 45 U/L (ref 40–129)
ALT SERPL W P-5'-P-CCNC: 25 U/L (ref 0–70)
AMPHETAMINES UR QL SCN: NORMAL
ANION GAP SERPL CALCULATED.3IONS-SCNC: 12 MMOL/L (ref 7–15)
APPEARANCE UR: CLEAR
AST SERPL W P-5'-P-CCNC: 21 U/L (ref 0–45)
BARBITURATES UR QL SCN: NORMAL
BASOPHILS # BLD AUTO: 0.1 10E3/UL (ref 0–0.2)
BASOPHILS NFR BLD AUTO: 1 %
BENZODIAZ UR QL SCN: NORMAL
BILIRUB SERPL-MCNC: 0.3 MG/DL
BILIRUB UR QL STRIP: NEGATIVE
BUN SERPL-MCNC: 11.2 MG/DL (ref 8–23)
BZE UR QL SCN: NORMAL
CALCIUM SERPL-MCNC: 8.8 MG/DL (ref 8.8–10.2)
CANNABINOIDS UR QL SCN: NORMAL
CARBAMAZEPINE SERPL-MCNC: 17.2 UG/ML (ref 4–12)
CHLORIDE SERPL-SCNC: 97 MMOL/L (ref 98–107)
COLOR UR AUTO: ABNORMAL
CREAT SERPL-MCNC: 1 MG/DL (ref 0.67–1.17)
DEPRECATED HCO3 PLAS-SCNC: 26 MMOL/L (ref 22–29)
EGFRCR SERPLBLD CKD-EPI 2021: 82 ML/MIN/1.73M2
EOSINOPHIL # BLD AUTO: 0 10E3/UL (ref 0–0.7)
EOSINOPHIL NFR BLD AUTO: 0 %
ERYTHROCYTE [DISTWIDTH] IN BLOOD BY AUTOMATED COUNT: 12 % (ref 10–15)
FENTANYL UR QL: NORMAL
GLUCOSE SERPL-MCNC: 134 MG/DL (ref 70–99)
GLUCOSE UR STRIP-MCNC: NEGATIVE MG/DL
HCT VFR BLD AUTO: 42.3 % (ref 40–53)
HGB BLD-MCNC: 14.3 G/DL (ref 13.3–17.7)
HGB UR QL STRIP: NEGATIVE
HOLD SPECIMEN: 0
HOLD SPECIMEN: NORMAL
IMM GRANULOCYTES # BLD: 0 10E3/UL
IMM GRANULOCYTES NFR BLD: 0 %
KETONES UR STRIP-MCNC: NEGATIVE MG/DL
LEUKOCYTE ESTERASE UR QL STRIP: NEGATIVE
LEVETIRACETAM SERPL-MCNC: 44.5 ΜG/ML (ref 10–40)
LYMPHOCYTES # BLD AUTO: 1 10E3/UL (ref 0.8–5.3)
LYMPHOCYTES NFR BLD AUTO: 20 %
MCH RBC QN AUTO: 31 PG (ref 26.5–33)
MCHC RBC AUTO-ENTMCNC: 33.8 G/DL (ref 31.5–36.5)
MCV RBC AUTO: 92 FL (ref 78–100)
MONOCYTES # BLD AUTO: 0.4 10E3/UL (ref 0–1.3)
MONOCYTES NFR BLD AUTO: 8 %
MUCOUS THREADS #/AREA URNS LPF: PRESENT /LPF
NEUTROPHILS # BLD AUTO: 3.5 10E3/UL (ref 1.6–8.3)
NEUTROPHILS NFR BLD AUTO: 71 %
NITRATE UR QL: NEGATIVE
NRBC # BLD AUTO: 0 10E3/UL
NRBC BLD AUTO-RTO: 0 /100
OPIATES UR QL SCN: NORMAL
PCP QUAL URINE (ROCHE): NORMAL
PH UR STRIP: 5.5 [PH] (ref 5–7)
PLATELET # BLD AUTO: 146 10E3/UL (ref 150–450)
POTASSIUM SERPL-SCNC: 4.1 MMOL/L (ref 3.4–5.3)
PROT SERPL-MCNC: 7 G/DL (ref 6.4–8.3)
RBC # BLD AUTO: 4.61 10E6/UL (ref 4.4–5.9)
RBC URINE: <1 /HPF
SODIUM SERPL-SCNC: 135 MMOL/L (ref 135–145)
SP GR UR STRIP: 1.02 (ref 1–1.03)
TROPONIN T SERPL HS-MCNC: 8 NG/L
UROBILINOGEN UR STRIP-MCNC: NORMAL MG/DL
VALPROATE SERPL-MCNC: <2.8 UG/ML
WBC # BLD AUTO: 5 10E3/UL (ref 4–11)
WBC URINE: <1 /HPF

## 2023-11-06 PROCEDURE — 84484 ASSAY OF TROPONIN QUANT: CPT | Performed by: EMERGENCY MEDICINE

## 2023-11-06 PROCEDURE — G0378 HOSPITAL OBSERVATION PER HR: HCPCS

## 2023-11-06 PROCEDURE — 86334 IMMUNOFIX E-PHORESIS SERUM: CPT | Performed by: STUDENT IN AN ORGANIZED HEALTH CARE EDUCATION/TRAINING PROGRAM

## 2023-11-06 PROCEDURE — 99222 1ST HOSP IP/OBS MODERATE 55: CPT | Mod: GC | Performed by: PSYCHIATRY & NEUROLOGY

## 2023-11-06 PROCEDURE — 84165 PROTEIN E-PHORESIS SERUM: CPT | Mod: 26 | Performed by: STUDENT IN AN ORGANIZED HEALTH CARE EDUCATION/TRAINING PROGRAM

## 2023-11-06 PROCEDURE — 84165 PROTEIN E-PHORESIS SERUM: CPT | Mod: TC | Performed by: STUDENT IN AN ORGANIZED HEALTH CARE EDUCATION/TRAINING PROGRAM

## 2023-11-06 PROCEDURE — 99207 PR INPT ADMISSION FROM CLINIC: CPT | Performed by: INTERNAL MEDICINE

## 2023-11-06 PROCEDURE — 36415 COLL VENOUS BLD VENIPUNCTURE: CPT | Performed by: NURSE PRACTITIONER

## 2023-11-06 PROCEDURE — 96374 THER/PROPH/DIAG INJ IV PUSH: CPT

## 2023-11-06 PROCEDURE — 99222 1ST HOSP IP/OBS MODERATE 55: CPT | Performed by: NURSE PRACTITIONER

## 2023-11-06 PROCEDURE — 36415 COLL VENOUS BLD VENIPUNCTURE: CPT | Performed by: EMERGENCY MEDICINE

## 2023-11-06 PROCEDURE — 999N000052 EEG AWAKE OR DROWSY ROUTINE

## 2023-11-06 PROCEDURE — 85025 COMPLETE CBC W/AUTO DIFF WBC: CPT | Performed by: EMERGENCY MEDICINE

## 2023-11-06 PROCEDURE — 84155 ASSAY OF PROTEIN SERUM: CPT | Performed by: PSYCHIATRY & NEUROLOGY

## 2023-11-06 PROCEDURE — 83036 HEMOGLOBIN GLYCOSYLATED A1C: CPT | Performed by: PSYCHIATRY & NEUROLOGY

## 2023-11-06 PROCEDURE — 80053 COMPREHEN METABOLIC PANEL: CPT | Performed by: EMERGENCY MEDICINE

## 2023-11-06 PROCEDURE — 80177 DRUG SCRN QUAN LEVETIRACETAM: CPT | Performed by: EMERGENCY MEDICINE

## 2023-11-06 PROCEDURE — 70551 MRI BRAIN STEM W/O DYE: CPT | Mod: 52

## 2023-11-06 PROCEDURE — 95816 EEG AWAKE AND DROWSY: CPT

## 2023-11-06 PROCEDURE — 99285 EMERGENCY DEPT VISIT HI MDM: CPT | Mod: 25

## 2023-11-06 PROCEDURE — 80307 DRUG TEST PRSMV CHEM ANLYZR: CPT | Performed by: EMERGENCY MEDICINE

## 2023-11-06 PROCEDURE — 81001 URINALYSIS AUTO W/SCOPE: CPT | Performed by: EMERGENCY MEDICINE

## 2023-11-06 PROCEDURE — 70544 MR ANGIOGRAPHY HEAD W/O DYE: CPT | Mod: XU

## 2023-11-06 PROCEDURE — G0439 PPPS, SUBSEQ VISIT: HCPCS | Performed by: INTERNAL MEDICINE

## 2023-11-06 PROCEDURE — 80156 ASSAY CARBAMAZEPINE TOTAL: CPT | Performed by: NURSE PRACTITIONER

## 2023-11-06 PROCEDURE — 86334 IMMUNOFIX E-PHORESIS SERUM: CPT | Mod: 26 | Performed by: STUDENT IN AN ORGANIZED HEALTH CARE EDUCATION/TRAINING PROGRAM

## 2023-11-06 PROCEDURE — 250N000011 HC RX IP 250 OP 636: Performed by: EMERGENCY MEDICINE

## 2023-11-06 PROCEDURE — 80164 ASSAY DIPROPYLACETIC ACD TOT: CPT | Performed by: EMERGENCY MEDICINE

## 2023-11-06 RX ORDER — ONDANSETRON 2 MG/ML
4 INJECTION INTRAMUSCULAR; INTRAVENOUS EVERY 6 HOURS PRN
Status: DISCONTINUED | OUTPATIENT
Start: 2023-11-06 | End: 2023-11-07 | Stop reason: HOSPADM

## 2023-11-06 RX ORDER — ROSUVASTATIN CALCIUM 20 MG/1
20 TABLET, COATED ORAL DAILY
Qty: 90 TABLET | Refills: 3 | Status: SHIPPED | OUTPATIENT
Start: 2023-11-06

## 2023-11-06 RX ORDER — ONDANSETRON 4 MG/1
4 TABLET, ORALLY DISINTEGRATING ORAL EVERY 6 HOURS PRN
Status: DISCONTINUED | OUTPATIENT
Start: 2023-11-06 | End: 2023-11-07 | Stop reason: HOSPADM

## 2023-11-06 RX ORDER — BISACODYL 10 MG
10 SUPPOSITORY, RECTAL RECTAL DAILY PRN
Status: DISCONTINUED | OUTPATIENT
Start: 2023-11-06 | End: 2023-11-07 | Stop reason: HOSPADM

## 2023-11-06 RX ORDER — PROCHLORPERAZINE 25 MG
12.5 SUPPOSITORY, RECTAL RECTAL EVERY 12 HOURS PRN
Status: DISCONTINUED | OUTPATIENT
Start: 2023-11-06 | End: 2023-11-07 | Stop reason: HOSPADM

## 2023-11-06 RX ORDER — LEVETIRACETAM 500 MG/1
1500 TABLET ORAL 2 TIMES DAILY
Status: DISCONTINUED | OUTPATIENT
Start: 2023-11-06 | End: 2023-11-07 | Stop reason: HOSPADM

## 2023-11-06 RX ORDER — PROCHLORPERAZINE MALEATE 5 MG
5 TABLET ORAL EVERY 6 HOURS PRN
Status: DISCONTINUED | OUTPATIENT
Start: 2023-11-06 | End: 2023-11-07 | Stop reason: HOSPADM

## 2023-11-06 RX ORDER — ACETAMINOPHEN 325 MG/1
650 TABLET ORAL EVERY 6 HOURS PRN
Status: DISCONTINUED | OUTPATIENT
Start: 2023-11-06 | End: 2023-11-07 | Stop reason: HOSPADM

## 2023-11-06 RX ORDER — LIDOCAINE 40 MG/G
CREAM TOPICAL
Status: DISCONTINUED | OUTPATIENT
Start: 2023-11-06 | End: 2023-11-07 | Stop reason: HOSPADM

## 2023-11-06 RX ORDER — PRAVASTATIN SODIUM 80 MG/1
1 TABLET ORAL EVERY 24 HOURS
COMMUNITY
End: 2023-11-06

## 2023-11-06 RX ORDER — AMOXICILLIN 250 MG
2 CAPSULE ORAL 2 TIMES DAILY PRN
Status: DISCONTINUED | OUTPATIENT
Start: 2023-11-06 | End: 2023-11-07 | Stop reason: HOSPADM

## 2023-11-06 RX ORDER — POLYETHYLENE GLYCOL 3350 17 G/17G
17 POWDER, FOR SOLUTION ORAL DAILY PRN
Status: DISCONTINUED | OUTPATIENT
Start: 2023-11-06 | End: 2023-11-07 | Stop reason: HOSPADM

## 2023-11-06 RX ORDER — LORAZEPAM 2 MG/ML
1 INJECTION INTRAMUSCULAR ONCE
Status: COMPLETED | OUTPATIENT
Start: 2023-11-06 | End: 2023-11-06

## 2023-11-06 RX ORDER — SILDENAFIL 100 MG/1
100 TABLET, FILM COATED ORAL DAILY PRN
Qty: 30 TABLET | Refills: 5 | Status: SHIPPED | OUTPATIENT
Start: 2023-11-06 | End: 2023-11-24

## 2023-11-06 RX ORDER — ACETAMINOPHEN 650 MG/1
650 SUPPOSITORY RECTAL EVERY 6 HOURS PRN
Status: DISCONTINUED | OUTPATIENT
Start: 2023-11-06 | End: 2023-11-07 | Stop reason: HOSPADM

## 2023-11-06 RX ORDER — LORAZEPAM 2 MG/ML
0.5 INJECTION INTRAMUSCULAR ONCE
Status: DISCONTINUED | OUTPATIENT
Start: 2023-11-06 | End: 2023-11-06

## 2023-11-06 RX ORDER — CARBAMAZEPINE 400 MG/1
800 TABLET, EXTENDED RELEASE ORAL 2 TIMES DAILY
Status: DISCONTINUED | OUTPATIENT
Start: 2023-11-06 | End: 2023-11-07 | Stop reason: HOSPADM

## 2023-11-06 RX ORDER — AMOXICILLIN 250 MG
1 CAPSULE ORAL 2 TIMES DAILY PRN
Status: DISCONTINUED | OUTPATIENT
Start: 2023-11-06 | End: 2023-11-07 | Stop reason: HOSPADM

## 2023-11-06 RX ADMIN — LORAZEPAM 1 MG: 2 INJECTION INTRAMUSCULAR; INTRAVENOUS at 13:11

## 2023-11-06 ASSESSMENT — ENCOUNTER SYMPTOMS
ARTHRALGIAS: 0
JOINT SWELLING: 0
NERVOUS/ANXIOUS: 0
EYE PAIN: 0
COUGH: 0
CONSTIPATION: 0
WEAKNESS: 0
HEMATURIA: 0
FEVER: 0
FREQUENCY: 0
CHILLS: 0
NAUSEA: 0
SHORTNESS OF BREATH: 0
PALPITATIONS: 0
MYALGIAS: 0
HEARTBURN: 0
PARESTHESIAS: 0
SORE THROAT: 0
DIZZINESS: 0
HEADACHES: 0
DIARRHEA: 0
ABDOMINAL PAIN: 0
DYSURIA: 0

## 2023-11-06 ASSESSMENT — PATIENT HEALTH QUESTIONNAIRE - PHQ9
SUM OF ALL RESPONSES TO PHQ QUESTIONS 1-9: 3
SUM OF ALL RESPONSES TO PHQ QUESTIONS 1-9: 3

## 2023-11-06 ASSESSMENT — ACTIVITIES OF DAILY LIVING (ADL)
ADLS_ACUITY_SCORE: 35
ADLS_ACUITY_SCORE: 33
ADLS_ACUITY_SCORE: 35
ADLS_ACUITY_SCORE: 35
CURRENT_FUNCTION: NO ASSISTANCE NEEDED
ADLS_ACUITY_SCORE: 33
ADLS_ACUITY_SCORE: 35

## 2023-11-06 NOTE — ED NOTES
Bed: ST03  Expected date:   Expected time:   Means of arrival:   Comments:  Sebastian - 523 - 68 M stroke alert eta 1113
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
## 2023-11-06 NOTE — H&P
[facility]  History and Physical - Hospitalist Service       Date of Admission:  11/6/2023  PRIMARY CARE PROVIDER:    Los Mcelroy    Assessment & Plan   Leighton Morales is a 68 year old male with a past medical history significant for seizure disorder, hyperlipidemia, thrombocytopenia who is being registered to observation on 11/06/23 for encephalopathy     Encephalopathy yet unclear if toxic or metabolic, no clear source based on lab data, MRI  and MRA brain limited but no definitive infarct, mass effect or midline shift and no proximal large arterial occlusion.  No history to suggest infectious etiology.  Remaining possibility includes toxic medication levels given prior similar episode approximately 1.5 years prior  - EEG  - We will consult general neuro regarding questionable need for repeat MRI  - Check Keppra and Tegretol levels  -Seizure precautions  - Neurochecks every 4 hours    Seizure disorder  *PTA regimen is Keppra and Tegretol  -Contingent upon the levels may need to hold doses    Hyperlipidemia  -Hold statin while observation status    Hyperglycemia  - Recheck in a.m., if still elevated check A1c    Thrombocytopenia, chronic usually runs in the 140s  -Check CBC as needed    KAMRAN  - CPAP at HS    Alcohol use totaling approximately 9 drinks per week  - Patient will require decrease/cessation counseling      Clinically Significant Risk Factors Present on Admission   None present                                  Diet:  Low-fat low-cholesterol 2 g sodium diet  DVT Prophylaxis: Pneumatic Compression Devices  Pinedo Catheter: Not present  Lines: None     Cardiac Monitoring: None  Code Status:  full code, presumed, not discussed w/ pt given encephalopathy unable to reach significant other by phone.  Please readdress         Disposition Plan      Entered: KARISSA Coleman CNP 11/06/2023, 2:42 PM     The patient's care was discussed with the Attending Physician, Dr. Parker Jim,  "Patient, and Patient's Family.      Karla Carlton, KARISSA CNP  [facility]  Securely message with [employer] (more info)  Text page via [facility] Paging/Directory     ______________________________________________________________________    Chief Complaint   Confusion, unbalanced    History is obtained from the patient and EMR    History of Present Illness   Leighton Morales is a 68 year old male with a past medical history significant for seizure disorder since childhood hyperlipidemia, thrombocytopenia who is being registered to observation for encephalopathy    Patient saw his PCP today for preventive visit and follow-up hyperlipidemia and at the end of the visit MD noted mental status changes, patient was lightheaded, weak, unable to stand without assistance.  Glucose was 129 after a snack but he was still symptomatic.  Vision was blurred and patient was confused with no seizure activity.  EMS was contacted for transport to local ED.  BP was 116/70.  Patient later indicated may have taken 2 doses of Keppra in the a.m. of admission.  Last known well time was 9:30 AM.    I've reviewed the ED course including VS (hypertensive), meds (lorazepam 1 mg to complete MRI)  diagnostics which were significant for glucose 134, platelets 146, valproic acid less than 2.8, brain MRI without contrast was negative for any LVO and MRA of the brain was negative for any mass, hemorrhage, etc. 10 point ROS neg aside from what is described above.  Hospitalist service was asked to admit the pt for further evaluation and management.     Patient's significant other corroborated the history.  Seizures are generally well controlled, \"staring type\" doesn't usually have GTC movement.  SO has never witnessed a seizure.  He [place] at [facility] [place]. Patton who reports some worsening of his short-term memory.  Patient denies confusion, significant other endorses that he is not quite his usual self and that he had a prior similar " episode with imbalance with toxic levels of his medications approximately 1.5 years prior.  He has a unique medication management system and there is concern that he may have taken 2 doses of his Keppra.    Past Medical History    I have reviewed this patient's medical history and updated it with pertinent information if needed.   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 Taking?   carBAMazepine (TEGRETOL XR) 400 MG 12 hr tablet 11/6/2023  No Yes   Sig: Take 2 tablets (800 mg) by mouth 2 times daily   levETIRAcetam (KEPPRA) 1000 MG tablet 11/6/2023 at took 1500 mg x2 11/6 AM  No Yes   Sig: Take 1.5 tablets (1,500 mg) by mouth 2 times daily   rosuvastatin (CRESTOR) 20 MG tablet 11/6/2023  No Yes   Sig: Take 1 tablet (20 mg) by mouth daily   sildenafil (VIAGRA) 100 MG tablet Unknown  No Yes   Sig: Take 1 tablet (100 mg) by mouth daily as needed (erectile dysfunction)      Facility-Administered Medications: None     Allergies   Allergies   Allergen Reactions    No Known Drug Allergy      Social history drinks 3 beers 2-3 times a week    Physical Exam   Vital Signs: Temp: 98.6  F (37  C) Temp src: Temporal BP: (!) 151/83 Pulse: 79   Resp: 21 SpO2: 98 % O2 Device: None (Room air)    Weight: 183 lbs 3.2 oz    Constitutional: vs as per EMR  General:  adult pt lying in bed without acute distress  Neuro: +follows commands wiggle toes and show 2 fingers bilat, face symmetric, tongue midline, speech fluent, no pronator drift  Eyes pupils equal round 3mm briskly  reactive bilat, sclera nonicteric, noninjected, conjunctiva pink,  Head, ENT & mouth: NC/AT,  mouth moist oral mucosa  Neck: supple  CV S1S2, no murmurs, normotensive   resp: CTAB upper and lower lobes  gi:normoactive bowel sounds, soft, nontender, nondisteded  Ext: no edema or mottling  Skin: no rashes on exposed skin  Lymph: defer  Musculoskeletal no bony joint deformities      Medical Decision Making       40 MINUTES SPENT BY ME on the date of service doing chart review, history, exam, documentation & further activities per the note.         Data   Data reviewed today: I reviewed all medications, new labs and imaging results over the last 24 hours. I personally reviewed the brain MRI image(s) showing as per HPI .      I have personally reviewed the following data over the past 24 hrs:    5.0  \   14.3   / 146 (L)     135 97 (L) 11.2 /  134 (H)   4.1 26 1.00 \     ALT: 25 AST: 21 AP: 45 TBILI: 0.3   ALB: 4.3 TOT PROTEIN: 7.0 LIPASE: N/A     Trop: 8 BNP: N/A       Imaging results reviewed over the past 24 hrs:   Recent Results (from the past 24 hour(s))   MR Brain w/o Contrast    Narrative    MR BRAIN WITHOUT CONTRAST, MRA BRAIN ([place]) WITHOUT  CONTRAST November 6, 2023 1:47 PM     HISTORY: Dizziness, right side incoordination.       COMPARISON: MRI 3/18/2022.    TECHNIQUE: Axial diffusion, with ADC map and time-of-flight MRA images  were obtained. Patient terminated exam at this point.    CONTRAST: None.    FINDINGS: Motion degraded axial diffusion weighted images are negative  for infarct. No appreciable mass, midline shift or mass effect. Axial  time-of-flight MRA images demonstrate patent proximal anterior, middle  and posterior cerebral arteries. No appreciable aneurysm.    Partially visualized right temporal lobe encephalomalacia.       Impression    IMPRESSION: Significant technical limitations secondary to motion and  patient terminating exam prematurely.  1. No appreciable intracranial  infarct, mass effect or midline shift.  2. No proximal large arterial occlusion.    BALTA GRIFFIN DO         SYSTEM ID:  [id number]   MRA Brain ([place]) wo Contrast    Narrative    MR BRAIN WITHOUT CONTRAST, MRA BRAIN ([place]) WITHOUT  CONTRAST November 6, 2023 1:47 PM     HISTORY: Dizziness, right side incoordination.       COMPARISON: MRI 3/18/2022.    TECHNIQUE: Axial diffusion, with ADC map and time-of-flight MRA images  were obtained. Patient terminated exam at this point.    CONTRAST: None.    FINDINGS: Motion degraded axial diffusion weighted images are negative  for infarct. No appreciable mass, midline shift or mass effect. Axial  time-of-flight MRA images demonstrate patent proximal anterior, middle  and posterior cerebral arteries. No appreciable aneurysm.    Partially visualized right temporal lobe encephalomalacia.       Impression    IMPRESSION: Significant technical limitations secondary to motion and  patient terminating exam prematurely.  1. No appreciable intracranial infarct, mass effect or midline shift.  2. No proximal large arterial occlusion.    BALTA GRIFFIN DO         SYSTEM ID:  [id number]

## 2023-11-06 NOTE — PHARMACY-ADMISSION MEDICATION HISTORY
Pharmacist Admission Medication History    Admission medication history is complete. The information provided in this note is only as accurate as the sources available at the time of the update.    Information Source(s): Patient and CareEverywhere/SureScripts via in-person    Pertinent Information:   Patient states he took a double dose of his Keppra on 11/6    Changes made to PTA medication list:  Added: None  Deleted: None  Changed: None    Medication Affordability:  Not including over the counter (OTC) medications, was there a time in the past 3 months when you did not take your medications as prescribed because of cost?: No    Allergies reviewed with patient and updates made in EHR: no    Medication History Completed By: MORRIS BARKLEY RPH 11/6/2023 1:09 PM    Prior to Admission medications    Medication Sig Last Dose Taking? Auth Provider Long Term End Date   carBAMazepine (TEGRETOL XR) 400 MG 12 hr tablet Take 2 tablets (800 mg) by mouth 2 times daily 11/6/2023 Yes Carmen Patton MD Yes    levETIRAcetam (KEPPRA) 1000 MG tablet Take 1.5 tablets (1,500 mg) by mouth 2 times daily 11/6/2023 at took 1500 mg x2 11/6 AM Yes Carmen Patton MD Yes    rosuvastatin (CRESTOR) 20 MG tablet Take 1 tablet (20 mg) by mouth daily 11/6/2023 Yes Los Mcelroy MD Yes    sildenafil (VIAGRA) 100 MG tablet Take 1 tablet (100 mg) by mouth daily as needed (erectile dysfunction) Unknown Yes Los Mcelroy MD Yes

## 2023-11-06 NOTE — ED TRIAGE NOTES
Patient presents to ER via EMS. Per report, patient was at a primary care appointment this morning for seizure medication management and was at his baseline. Roughly around 0930, MD at clinic noted that the patient was lightheaded and not answering questions appropriately. Blood sugar 157. 's for EMS. Patient endorses that he might have taken two doses of Keppra this morning.

## 2023-11-06 NOTE — ED NOTES
[facility]  ED Nurse Handoff Report    ED Chief complaint: Stroke Symptoms      ED Diagnosis:   Final diagnoses:   None       Code Status: Full Code    Allergies:   Allergies   Allergen Reactions    No Known Drug Allergy        Patient Story:  68 year old male with a history of seizures who presents with stroke symptoms. His last known well was at 0930 this morning. He was at an internal medicine appointment earlier this morning for seizure management and was initially at baseline at the beginning of the appointment. Then about shelter through, the provider noticed that he was having some trouble with his speech. Additionally, he attempted to stand up at one point, but fell backwards due to dizziness. Upon arrival, EMS notes that he was not answering questions appropriately for them.Prior to the appointment this morning, he might have taken double of his Keppra prescription.   Focused Assessment:    Results for orders placed or performed during the hospital encounter of 11/06/23   Comprehensive metabolic panel     Status: Abnormal   Result Value Ref Range    Sodium 135 135 - 145 mmol/L    Potassium 4.1 3.4 - 5.3 mmol/L    Carbon Dioxide (CO2) 26 22 - 29 mmol/L    Anion Gap 12 7 - 15 mmol/L    Urea Nitrogen 11.2 8.0 - 23.0 mg/dL    Creatinine 1.00 0.67 - 1.17 mg/dL    GFR Estimate 82 >60 mL/min/1.73m2    Calcium 8.8 8.8 - 10.2 mg/dL    Chloride 97 (L) 98 - 107 mmol/L    Glucose 134 (H) 70 - 99 mg/dL    Alkaline Phosphatase 45 40 - 129 U/L    AST 21 0 - 45 U/L    ALT 25 0 - 70 U/L    Protein Total 7.0 6.4 - 8.3 g/dL    Albumin 4.3 3.5 - 5.2 g/dL    Bilirubin Total 0.3 <=1.2 mg/dL   [place] Draw     Status: None    Narrative    The following orders were created for panel order [place] Draw.  Procedure                               Abnormality         Status                     ---------                               -----------         ------                     Extra Blue Top Tube[[id number]]                               Final result               Extra Red Top Tube[[id number]]                               Final result                 Please view results for these tests on the individual orders.   CBC with platelets and differential     Status: Abnormal   Result Value Ref Range    WBC Count 5.0 4.0 - 11.0 10e3/uL    RBC Count 4.61 4.40 - 5.90 10e6/uL    Hemoglobin 14.3 13.3 - 17.7 g/dL    Hematocrit 42.3 40.0 - 53.0 %    MCV 92 78 - 100 fL    MCH 31.0 26.5 - 33.0 pg    MCHC 33.8 31.5 - 36.5 g/dL    RDW 12.0 10.0 - 15.0 %    Platelet Count 146 (L) 150 - 450 10e3/uL    % Neutrophils 71 %    % Lymphocytes 20 %    % Monocytes 8 %    % Eosinophils 0 %    % Basophils 1 %    % Immature Granulocytes 0 %    NRBCs per 100 WBC 0 <1 /100    Absolute Neutrophils 3.5 1.6 - 8.3 10e3/uL    Absolute Lymphocytes 1.0 0.8 - 5.3 10e3/uL    Absolute Monocytes 0.4 0.0 - 1.3 10e3/uL    Absolute Eosinophils 0.0 0.0 - 0.7 10e3/uL    Absolute Basophils 0.1 0.0 - 0.2 10e3/uL    Absolute Immature Granulocytes 0.0 <=0.4 10e3/uL    Absolute NRBCs 0.0 10e3/uL   Extra Blue Top Tube     Status: None   Result Value Ref Range    Hold Specimen 0    Extra Red Top Tube     Status: None   Result Value Ref Range    Hold Specimen JIC    Troponin T, High Sensitivity     Status: Normal   Result Value Ref Range    Troponin T, High Sensitivity 8 <=22 ng/L   Valproic acid (Depakote level)     Status: Abnormal   Result Value Ref Range    Valproic acid <2.8 (L)   ug/mL   UA with Microscopic reflex to Culture     Status: Abnormal    Specimen: Urine, Midstream   Result Value Ref Range    Color Urine Light Yellow Colorless, Straw, Light Yellow, Yellow    Appearance Urine Clear Clear    Glucose Urine Negative Negative mg/dL    Bilirubin Urine Negative Negative    Ketones Urine Negative Negative mg/dL    Specific Gravity Urine 1.019 1.003 - 1.035    Blood Urine Negative Negative    pH Urine 5.5 5.0 - 7.0    Protein Albumin Urine Negative Negative mg/dL     Urobilinogen Urine Normal Normal, 2.0 mg/dL    Nitrite Urine Negative Negative    Leukocyte Esterase Urine Negative Negative    Mucus Urine Present (A) None Seen /LPF    RBC Urine <1 <=2 /HPF    WBC Urine <1 <=5 /HPF    Narrative    Urine Culture not indicated   Urine Drug Screen Panel     Status: Normal   Result Value Ref Range    Amphetamines Urine Screen Negative Screen Negative    Barbituates Urine Screen Negative Screen Negative    Benzodiazepine Urine Screen Negative Screen Negative    Cannabinoids Urine Screen Negative Screen Negative    Cocaine Urine Screen Negative Screen Negative    Fentanyl Qual Urine Screen Negative Screen Negative    Opiates Urine Screen Negative Screen Negative    PCP Urine Screen Negative Screen Negative   CBC with platelets + differential     Status: Abnormal    Narrative    The following orders were created for panel order CBC with platelets + differential.  Procedure                               Abnormality         Status                     ---------                               -----------         ------                     CBC with platelets and d...[[id number]]  Abnormal            Final result                 Please view results for these tests on the individual orders.   Urine Drug Screen     Status: Normal    Narrative    The following orders were created for panel order Urine Drug Screen.  Procedure                               Abnormality         Status                     ---------                               -----------         ------                     Urine Drug Screen Panel[[id number]]      Normal              Final result                 Please view results for these tests on the individual orders.         Treatments and/or interventions provided:   Patient's response to treatments and/or interventions:     To be done/followed up on inpatient unit:  monitor    Does this patient have any cognitive concerns?: no    Activity level - Baseline/Home:   Independent  Activity Level - Current:   Stand with Assist    Patient's Preferred language: English   Needed?: No    Isolation: None  Infection: Not Applicable  Patient tested for COVID 19 prior to admission: NO  Bariatric?: No    Vital Signs:   Vitals:    11/06/23 1124 11/06/23 1200 11/06/23 1300 11/06/23 1340   BP: (!) 145/95   (!) 151/83   Pulse: 87 79 78 79   Resp: 18 20 21    Temp:       TempSrc:       SpO2: 99% 100%  98%   Weight:       Height:           Cardiac Rhythm:     Was the PSS-3 completed:   Yes  What interventions are required if any?               Family Comments: wife at bedside  OBS brochure/video discussed/provided to patient/family: No              Name of person given brochure if not patient:               Relationship to patient:     For the majority of the shift this patient's behavior was Green.   Behavioral interventions performed were .    ED NURSE PHONE NUMBER: [phone]

## 2023-11-06 NOTE — ED PROVIDER NOTES
History     Chief Complaint:  Stroke Symptoms       The history is provided by the patient.      Leighton Morales is a 68 year old male with a history of seizures who presents with stroke symptoms. His last known well was at 0930 this morning. He was at an internal medicine appointment earlier this morning for seizure management and was initially at baseline at the beginning of the appointment. Then about retirement through, the provider noticed that he was having some trouble with his speech. Additionally, he attempted to stand up at one point, but fell backwards due to dizziness. EMS was called and he was brought to the ED. While en route, his blood sugar was 157 and blood pressure was 140's systolic. Upon arrival, EMS notes that he was not answering questions appropriately for them. He endorses feeling fatigued at bedside. Prior to the appointment this morning, he might have taken double of his Keppra prescription. He denies having a headache.     Independent Historian:   The EMS personnel     Review of External Notes:   None     Medications:    Tegretol   Keppra   Crestor   Viagra     Past Medical History:    Cavernous hemangioma of brain   ED   HLD   KAMRAN   Anxiety   BPH w/ LUTS     Past Surgical History:    L inguinal hernia surgery   Nasal fx repair   Temporal lobectomy   Dental extraction     Physical Exam   Patient Vitals for the past 24 hrs:   BP Temp Temp src Pulse Resp SpO2 Height Weight   11/06/23 1340 (!) 151/83 -- -- 79 -- 98 % -- --   11/06/23 1300 -- -- -- 78 21 -- -- --   11/06/23 1200 -- -- -- 79 20 100 % -- --   11/06/23 1124 (!) 145/95 -- -- 87 18 99 % -- --   11/06/23 1121 (!) 160/90 98.6  F (37  C) Temporal 91 22 99 % 1.829 m (6') 83.1 kg (183 lb 3.2 oz)        Physical Exam  Nursing note and vitals reviewed.    Constitutional:  Appears comfortable.    HENT:    Nose normal.  No discharge.      Oral mucosa is moist.  Eyes:    Conjunctivae are normal without injection.  Pupils are  equal.  Cardiovascular:  Normal rate, regular rhythm with normal S1 and S2.      Normal heart sounds and peripheral pulses 2+ and equal.       No murmur or laura.  Pulmonary:  Effort normal and breath sounds clear to auscultation bilaterally.    No respiratory distress.     GI:    Soft. No distension and no mass. No tenderness.      No rebound and no guarding. No flank pain.  No HSM.  Musculoskeletal:  Normal range of motion. No extremity deformity.     No edema and no tenderness.    Neurological:   GCS 15. NIH stroke scale score 0. O X 3.  No sensory deficit. Normal strength in all extremities.   Normal finger to nose. Normal heel-knee-shin. No hand drift. No leg drift.   Visual fields full. EOMs intact. No diplopia. No facial droop. No slurred speech.   Comprehension and expression of speech is normal. Mental status reveals occasionally a little slowing and delay in answering questions.  Skin:    Skin is warm and dry. No rash noted. No diaphoresis.      No erythema. No pallor.  No lesions.  Psychiatric:   Behavior is normal. Appropriate mood and affect.     At times seems just a little bit off mentally.    Emergency Department Course   ECG  ECG taken at 1119, ECG read at 1125  Sinus rhythm with 1st degree AV block   Left axis deviation   Abnormal ECG   Rate 84 bpm. GA interval 214 ms. QRS duration 90 ms. QT/QTc 352/415 ms. P-R-T axes 67 -41 54.     Imaging:  MRA Brain ([place] [facility] [place]) wo Contrast   Final Result   IMPRESSION: Significant technical limitations secondary to motion and   patient terminating exam prematurely.   1. No appreciable intracranial infarct, mass effect or midline shift.   2. No proximal large arterial occlusion.      BALTA GRIFFIN DO            SYSTEM ID:  [id number]      MR Brain w/o Contrast   Final Result   IMPRESSION: Significant technical limitations secondary to motion and   patient terminating exam prematurely.   1. No appreciable intracranial infarct, mass effect or midline shift.    2. No proximal large arterial occlusion.      BALTA ELIAS             SYSTEM ID:  [id number]         Results per radiology     Laboratory:  Labs Ordered and Resulted from Time of ED Arrival to Time of ED Departure   COMPREHENSIVE METABOLIC PANEL - Abnormal       Result Value    Sodium 135      Potassium 4.1      Carbon Dioxide (CO2) 26      Anion Gap 12      Urea Nitrogen 11.2      Creatinine 1.00      GFR Estimate 82      Calcium 8.8      Chloride 97 (*)     Glucose 134 (*)     Alkaline Phosphatase 45      AST 21      ALT 25      Protein Total 7.0      Albumin 4.3      Bilirubin Total 0.3     CBC WITH PLATELETS AND DIFFERENTIAL - Abnormal    WBC Count 5.0      RBC Count 4.61      Hemoglobin 14.3      Hematocrit 42.3      MCV 92      MCH 31.0      MCHC 33.8      RDW 12.0      Platelet Count 146 (*)     % Neutrophils 71      % Lymphocytes 20      % Monocytes 8      % Eosinophils 0      % Basophils 1      % Immature Granulocytes 0      NRBCs per 100 WBC 0      Absolute Neutrophils 3.5      Absolute Lymphocytes 1.0      Absolute Monocytes 0.4      Absolute Eosinophils 0.0      Absolute Basophils 0.1      Absolute Immature Granulocytes 0.0      Absolute NRBCs 0.0     VALPROIC ACID - Abnormal    Valproic acid <2.8 (*)    ROUTINE UA WITH MICROSCOPIC REFLEX TO CULTURE - Abnormal    Color Urine Light Yellow      Appearance Urine Clear      Glucose Urine Negative      Bilirubin Urine Negative      Ketones Urine Negative      Specific Gravity Urine 1.019      Blood Urine Negative      pH Urine 5.5      Protein Albumin Urine Negative      Urobilinogen Urine Normal      Nitrite Urine Negative      Leukocyte Esterase Urine Negative      Mucus Urine Present (*)     RBC Urine <1      WBC Urine <1     TROPONIN T, HIGH SENSITIVITY - Normal    Troponin T, High Sensitivity 8     URINE DRUG SCREEN PANEL - Normal    Amphetamines Urine Screen Negative      Barbituates Urine Screen Negative      Benzodiazepine Urine Screen Negative       Cannabinoids Urine Screen Negative      Cocaine Urine Screen Negative      Fentanyl Qual Urine Screen Negative      Opiates Urine Screen Negative      PCP Urine Screen Negative     KEPPRA (LEVETIRACETAM) LEVEL   CARBAMAZEPINE TOTAL      Emergency Department Course & Assessments:    Interventions:  Medications   LORazepam (ATIVAN) injection 1 mg (1 mg Intravenous $Given 11/6/23 1311)      Assessments:  1113 I obtained history and examined the patient as noted above.  1431 I rechecked the patient and explained findings. I discussed plan for admission to the hospital.    Independent Interpretation (X-rays, CTs, rhythm strip):  None     Consultations/Discussion of Management or Tests:  1122 I spoke with Dr. Rosas from Stroke Neurology.  1448 I spoke with Karla Carlton for Dr. Jim from Hospitalist Services, who accepts the patient for admission.  1457 I spoke with Dr. Rosas from Stroke Neurology.  1532 I spoke with Karla Carlton from Hospitalist Services.    Social Determinants of Health affecting care:   None    Disposition:  The patient was admitted to the hospital under the care of Dr. Jim.     Impression & Plan    Medical Decision Making:  Patient comes in with the onset of what sounds like some mild ataxia and a little bit of confusion at about 930.  I discussed the case immediately with stroke neuro as he had a normal exam for me and they felt going to MRI would be indicated.  They saw the patient as well and he was de-escalated.  The patient seemed off a little bit mentally but not significantly confused.  However he seemed restless and his wife said that while I was not in the room he would try to get out of the bed at times.  When he went to MRI he moved a lot and said he did not feel claustrophobic but just could not lay still.  He does not have akathisia.  Blood work came back fairly unremarkable blood sugar 134 troponin is normal comp panel is normal CBC is normal.  Platelets are slightly low at 146.  Drug  screen is negative urine is negative.  I talked with pharmacy about this possible taking 1500 mg of extra Keppra this morning and they said it certainly can cause some type of ataxia.  His wife states that when he got up to go the bathroom and she stood by and by the bed he was very unsteady and she thought he could fall if she was not there.  With the ataxia and being off a little bit mentally, I would like to bring the patient in the hospital for further evaluation.  I discussed it with stroke neuro again and they agreed and felt general neuro should see him.  Dr. Jim will be the admitting doctor and will put a consult in for general neuro.  May be an EEG to rule out an atypical seizure.  If it was related to the increased Keppra dose, his Keppra level is pending and if it is elevated would hold his Keppra and see if his mentation clears.  He might need a sitter.      Diagnosis:    ICD-10-CM    1. Ataxia  R27.0       2. Confusion  R41.0            Scribe Disclosure:  I, Morgan Walters, am serving as a scribe at 11:27 AM on 11/6/2023 to document services personally performed by Hellen Joshua MD based on my observations and the provider's statements to me.   11/6/2023   No att. providers found        Hellen Joshua MD  11/06/23 [id number]

## 2023-11-06 NOTE — CONSULTS
"[facility]    Stroke Consult Note    Reason for Consult:  Imbalance    Chief Complaint: Imbalance       HPI  Leighton Morales is a 68 year old male with h/o seizures on Keppra and Tegretol, who thinks he might have overdosed on one of them by mistake and subsequently felt off balance when he was at his PCP's office a few hours later. Wife at bedside also thinks he is agitated and not like his usual self. No other neurological deficits.    MRI brain was requested which showed no e/o acute stroke on DWI/ADC series.     Impression  Imbalance  Nystagmus  Encephalopathy/Agitation    No e/o stroke on MRI. Possibly toxicity from overdosing on anti-seizure meds; unclear       Recommendations   No further stroke work up needed; please consult eral Neurology for further neurological work up      Thank you for this consult.     The Stroke Staff is Dr. Nickreson.    Joshua Rosas MD  Vascular Neurology Fellow    To page me or covering stroke neurology team member, click here: AMCOM  Choose \"On Call\" tab at top, then select \"NEUROLOGY/ALL SITES\" from middle drop-down box, press Enter, then look for \"stroke\" or \"telestroke\" for your site.  _____________________________________________________    Clinically Significant Risk Factors Present on Admission                                  Past Medical History    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     Closed fracture of unspecified phalanx or phalanges of hand     Erectile dysfunction, unspecified erectile dysfunction type 12/29/2017    Hyperlipidemia     Nasal bones, closed fracture     KAMRAN on CPAP     Other affections of shoulder region, not elsewhere classified     Other convulsions      Medications   Home Meds  Prior to Admission medications    Medication Sig Start Date End Date Taking? Authorizing Provider   carBAMazepine (TEGRETOL XR) 400 MG 12 hr tablet Take 2 tablets (800 mg) by mouth 2 " times daily 3/13/23  Yes Carmen Patton MD   levETIRAcetam (KEPPRA) 1000 MG tablet Take 1.5 tablets (1,500 mg) by mouth 2 times daily 3/4/23  Yes Carmen Patton MD   rosuvastatin (CRESTOR) 20 MG tablet Take 1 tablet (20 mg) by mouth daily 11/6/23  Yes Los Mcelroy MD   sildenafil (VIAGRA) 100 MG tablet Take 1 tablet (100 mg) by mouth daily as needed (erectile dysfunction) 11/6/23  Yes Los Mcelroy MD       Scheduled Meds      Infusion Meds      Allergies   Allergies   Allergen Reactions    No Known Drug Allergy           PHYSICAL EXAMINATION   Temp:  [97.2  F (36.2  C)-98.6  F (37  C)] 98.6  F (37  C)  Pulse:  [72-91] 79  Resp:  [18-22] 21  BP: (123-160)/(71-95) 151/83  SpO2:  [97 %-100 %] 98 %    Neuro: Awake, alert, slow to follow commands, EOMI, left beating nystagmus on right gaze, grade 5/5 strength in prox muscles of b/l UE and LE, sensation intact b/ll, FNF intact b/lly.     Imaging  I personally reviewed all imaging; relevant findings per HPI.    Labs Data   CBC  Recent Labs   Lab 11/06/23  1120   WBC 5.0   RBC 4.61   HGB 14.3   HCT 42.3   *     Basic Metabolic Panel   Recent Labs   Lab 11/06/23  1120      POTASSIUM 4.1   CHLORIDE 97*   CO2 26   BUN 11.2   CR 1.00   *   DEREK 8.8     Liver Panel  Recent Labs   Lab 11/06/23  1120   PROTTOTAL 7.0   ALBUMIN 4.3   BILITOTAL 0.3   ALKPHOS 45   AST 21   ALT 25     INR  No lab results found.        Stroke Consult Data Data   This was a non-emergent, non-telestroke consult.

## 2023-11-06 NOTE — PATIENT INSTRUCTIONS
Continue Rosuvastatin for cholesterol  Call  [phone] or use Mychart to schedule a future lab appointment  fasting in next 1-2 weeks.   For fasting labs, please refrain from eating for 8 hours or more.   Drink 2 glasses of water before your lab appointment. It is fine to take your  oral medications on the morning of the lab test as usual   Get covid booster and 65+ flu vaccine at pharmacy in the next week when feeling better   2 weeks later, then get Prevnar-20 vaccine for pneumonia prevention at  pharmacy  In 4 weeks, then get td (tetanus) vaccine through pharmacy   May use Miralax 17g powder mixed with glass of water or other  liquid  every 1-3 days as needed to soften stool. May  get over the counter   Lab  for glucose today  See Neurology in  January 2024 as scheduled  Viagra 100mg tab. The dose of this to start is 100mg (1 tab)  taken as needed with sexual activity. Do not take more than once a day.   The medication is taken within 1/2-2 hours before sexual activity. Possible side effects include heartburn, headache, lightheadedness, vision changes. Make sure that you have had some fluids to drink  In the few hours prior to use so that you are not dehydrated as that can increase the risk for headaches and lightheadedness. Avoid alcohol when taking the medication. Never take Viagra if you are taking nitroglycerin medication. Stop if any vision changes   Evaluation in ER today at [facility] re: acute  confusion. Possible medication reaction/excess dosage consumed              Patient Education   Personalized Prevention Plan  You are due for the preventive services outlined below.  Your care team is available to assist you in scheduling these services.  If you have already completed any of these items, please share that information with your care team to update in your medical record.  Health Maintenance Due   Topic Date Due    RSV VACCINE (Pregnancy & 60+) (1 - 1-dose 60+ series) Never done    AORTIC  ANEURYSM SCREENING (SYSTEM ASSIGNED)  Never done    Pneumococcal Vaccine (2 - PCV) 01/22/2022    Annual Wellness Visit  04/25/2023    ANNUAL REVIEW OF HM ORDERS  04/25/2023    Flu Vaccine (1) 09/01/2023    COVID-19 Vaccine (6 - 2023-24 season) 09/01/2023    Diptheria Tetanus Pertussis (DTAP/TDAP/TD) Vaccine (2 - Td or Tdap) 10/29/2023     Your Health Risk Assessment indicates you feel you are not in good emotional health.    Recreation   Recreation is not limited to sports and team events. It includes any activity that provides relaxation, interest, enjoyment, and exercise. Recreation provides an outlet for physical, mental, and social energy. It can give a sense of worth and achievement. It can help you stay healthy.    Mental Exercise and Social Involvement  Mental and emotional health is as important as physical health. Keep in touch with friends and family. Stay as active as possible. Continue to learn and challenge yourself.   Things you can do to stay mentally active are:  Learn something new, like a foreign language or musical instrument.   Play SCRABBLE or do crossword puzzles. If you cannot find people to play these games with you at home, you can play them with others on your computer through the Internet.   Join a games club--anything from card games to chess or checkers or lawn bowling.   Start a new hobby.   Go back to school.   Volunteer.   Read.   Keep up with world events.

## 2023-11-07 VITALS
OXYGEN SATURATION: 100 % | WEIGHT: 177.47 LBS | DIASTOLIC BLOOD PRESSURE: 82 MMHG | BODY MASS INDEX: 24.85 KG/M2 | SYSTOLIC BLOOD PRESSURE: 113 MMHG | HEART RATE: 71 BPM | TEMPERATURE: 97.4 F | HEIGHT: 71 IN | RESPIRATION RATE: 18 BRPM

## 2023-11-07 LAB
ANION GAP SERPL CALCULATED.3IONS-SCNC: 10 MMOL/L (ref 7–15)
BUN SERPL-MCNC: 7.8 MG/DL (ref 8–23)
CALCIUM SERPL-MCNC: 8.8 MG/DL (ref 8.8–10.2)
CHLORIDE SERPL-SCNC: 100 MMOL/L (ref 98–107)
CREAT SERPL-MCNC: 1.03 MG/DL (ref 0.67–1.17)
DEPRECATED HCO3 PLAS-SCNC: 27 MMOL/L (ref 22–29)
EGFRCR SERPLBLD CKD-EPI 2021: 79 ML/MIN/1.73M2
GLUCOSE SERPL-MCNC: 101 MG/DL (ref 70–99)
HBA1C MFR BLD: 5.6 %
POTASSIUM SERPL-SCNC: 4.1 MMOL/L (ref 3.4–5.3)
SODIUM SERPL-SCNC: 137 MMOL/L (ref 135–145)
TOTAL PROTEIN SERUM FOR ELP: 6.9 G/DL (ref 6.4–8.3)
TSH SERPL DL<=0.005 MIU/L-ACNC: 2.46 UIU/ML (ref 0.3–4.2)
VIT B12 SERPL-MCNC: 386 PG/ML (ref 232–1245)

## 2023-11-07 PROCEDURE — 82607 VITAMIN B-12: CPT | Performed by: PSYCHIATRY & NEUROLOGY

## 2023-11-07 PROCEDURE — G0378 HOSPITAL OBSERVATION PER HR: HCPCS

## 2023-11-07 PROCEDURE — 250N000013 HC RX MED GY IP 250 OP 250 PS 637: Performed by: PSYCHIATRY & NEUROLOGY

## 2023-11-07 PROCEDURE — 99239 HOSP IP/OBS DSCHRG MGMT >30: CPT | Performed by: STUDENT IN AN ORGANIZED HEALTH CARE EDUCATION/TRAINING PROGRAM

## 2023-11-07 PROCEDURE — 36415 COLL VENOUS BLD VENIPUNCTURE: CPT | Performed by: NURSE PRACTITIONER

## 2023-11-07 PROCEDURE — 258N000003 HC RX IP 258 OP 636: Performed by: STUDENT IN AN ORGANIZED HEALTH CARE EDUCATION/TRAINING PROGRAM

## 2023-11-07 PROCEDURE — 80048 BASIC METABOLIC PNL TOTAL CA: CPT | Performed by: NURSE PRACTITIONER

## 2023-11-07 PROCEDURE — 84443 ASSAY THYROID STIM HORMONE: CPT | Performed by: PSYCHIATRY & NEUROLOGY

## 2023-11-07 RX ADMIN — LEVETIRACETAM 1500 MG: 500 TABLET, FILM COATED ORAL at 10:42

## 2023-11-07 RX ADMIN — SODIUM CHLORIDE 500 ML: 9 INJECTION, SOLUTION INTRAVENOUS at 10:31

## 2023-11-07 RX ADMIN — CARBAMAZEPINE 800 MG: 400 TABLET, EXTENDED RELEASE ORAL at 10:40

## 2023-11-07 ASSESSMENT — ACTIVITIES OF DAILY LIVING (ADL)
ADLS_ACUITY_SCORE: 33
DEPENDENT_IADLS:: INDEPENDENT
ADLS_ACUITY_SCORE: 33

## 2023-11-07 NOTE — DISCHARGE SUMMARY
[facility]  Hospitalist Discharge Summary      Date of Admission:  11/6/2023  Date of Discharge:  11/7/2023  Discharging Provider: Olinda Tyler DO  Discharge Service: Hospitalist Service    Discharge Diagnoses   See below    Clinically Significant Risk Factors          Follow-ups Needed After Discharge   Follow-up Appointments     Follow-up and recommended labs and tests       Follow up with neurology in a few weeks to discuss any changes to   medications            Unresulted Labs Ordered in the Past 30 Days of this Admission       Date and Time Order Name Status Description    11/7/2023  9:30 AM Protein Electrophoresis, Serum In process     11/7/2023  9:30 AM Total Protein, Serum for ELP In process     11/7/2023  9:24 AM Protein Immunofixation Serum In process     11/7/2023  9:24 AM Vitamin B12 In process         These results will be followed up by patient's primary neurologist    Discharge Disposition   Discharged to home  Condition at discharge: Stable    Hospital Course   Leighton Morales is a 68 year old male with a past medical history significant for seizure disorder, hyperlipidemia, thrombocytopenia who is being registered to observation on 11/06/23 for encephalopathy. Symptoms resolved with supportive care and patient discharged home.     Encephalopathy   Seizure disorder  yet unclear if toxic or metabolic, no clear source based on lab data, MRI  and MRA brain limited but no definitive infarct, mass effect or midline shift and no proximal large arterial occlusion.  No history to suggest infectious etiology.  Remaining possibility includes toxic medication levels given prior similar episode approximately 1.5 years prior    - EEG negative for seizure. Keppra and tegretol levels returned slightly elevated. Neurology evaluated and did not recommend changes to any medications. Close follow up with primary neurologist advised and better adherence to medications. Suspect patient took  exta doses.  recommended but patient declined. He discharge home the next day       Hyperlipidemia  -resume home statin     Hyperglycemia  - normal A1C     Thrombocytopenia, chronic usually runs in the 140s     KAMRAN  - CPAP at HS     Alcohol use totaling approximately 9 drinks per week  - Patient will require decrease/cessation counseling       Consultations This Hospital Stay   NEUROLOGY IP CONSULT  CARE MANAGEMENT / SOCIAL WORK IP CONSULT  OCCUPATIONAL THERAPY ADULT IP CONSULT    Code Status   Full Code    Time Spent on this Encounter   IOlinda DO, personally saw the patient today and spent greater than 30 minutes discharging this patient.       Olinda Tyler DO  [facility] EXTENDED RECOVERY AND SHORT STAY  [address] [place] [place] [address]  Phone: [phone]  ______________________________________________________________________    Physical Exam   Vital Signs: Temp: 97.4  F (36.3  C) Temp src: Oral BP: 113/82 Pulse: 71   Resp: 18 SpO2: 100 % O2 Device: None (Room air)    Weight: 177 lbs 7.52 oz       Primary Care Physician   Los Mcelroy    Discharge Orders      Reason for your hospital stay    You presented for confusion and concern for seizures at your doctors office. This was ruled out and its likely that you took too much of your seizure medications and were dehydrated due to fasting. Please arranged close follow up with your primary neurologist.     Follow-up and recommended labs and tests     Follow up with neurology in a few weeks to discuss any changes to medications     Activity    Your activity upon discharge: activity as tolerated     Diet    Follow this diet upon discharge: Orders Placed This Encounter      Combination Diet Moderate Consistent Carb (60 g CHO per Meal) Diet; 2 gm NA Diet; Low Saturated Fat Diet       Significant Results and Procedures   Results for orders placed or performed during the hospital encounter of 11/06/23   MRA Brain ([place]) wo Contrast    Narrative    MR BRAIN WITHOUT CONTRAST, MRA BRAIN ([place]) WITHOUT  CONTRAST November 6, 2023 1:47 PM     HISTORY: Dizziness, right side incoordination.       COMPARISON: MRI 3/18/2022.    TECHNIQUE: Axial diffusion, with ADC map and time-of-flight MRA images  were obtained. Patient terminated exam at this point.    CONTRAST: None.    FINDINGS: Motion degraded axial diffusion weighted images are negative  for infarct. No appreciable mass, midline shift or mass effect. Axial  time-of-flight MRA images demonstrate patent proximal anterior, middle  and posterior cerebral arteries. No appreciable aneurysm.    Partially visualized right temporal lobe encephalomalacia.       Impression    IMPRESSION: Significant technical limitations secondary to motion and  patient terminating exam prematurely.  1. No appreciable intracranial infarct, mass effect or midline shift.  2. No proximal large arterial occlusion.    BALTA GRIFFIN DO         SYSTEM ID:  [id number]   MR Brain w/o Contrast    Narrative    MR BRAIN WITHOUT CONTRAST, MRA BRAIN ([place]) WITHOUT  CONTRAST November 6, 2023 1:47 PM     HISTORY: Dizziness, right side incoordination.       COMPARISON: MRI 3/18/2022.    TECHNIQUE: Axial diffusion, with ADC map and time-of-flight MRA images  were obtained. Patient terminated exam at this point.    CONTRAST: None.    FINDINGS: Motion degraded axial diffusion weighted images are negative  for infarct. No appreciable mass, midline shift or mass effect. Axial  time-of-flight MRA images demonstrate patent proximal anterior, middle  and posterior cerebral arteries. No appreciable aneurysm.    Partially visualized right temporal lobe encephalomalacia.       Impression    IMPRESSION: Significant technical limitations secondary to motion and  patient terminating exam prematurely.  1. No appreciable intracranial infarct, mass effect or midline shift.  2. No proximal large arterial  occlusion.    BALTA GRIFFIN DO         SYSTEM ID:  [id number]       Discharge Medications   Discharge Medication List as of 11/7/2023 11:56 AM        CONTINUE these medications which have NOT CHANGED    Details   carBAMazepine (TEGRETOL XR) 400 MG 12 hr tablet Take 2 tablets (800 mg) by mouth 2 times daily, Disp-360 tablet, R-2, EDIL, E-PrescribeRising mft only.      levETIRAcetam (KEPPRA) 1000 MG tablet Take 1.5 tablets (1,500 mg) by mouth 2 times daily, Disp-270 tablet, R-2, E-Prescribe      rosuvastatin (CRESTOR) 20 MG tablet Take 1 tablet (20 mg) by mouth daily, Disp-90 tablet, R-3, E-Prescribe      sildenafil (VIAGRA) 100 MG tablet Take 1 tablet (100 mg) by mouth daily as needed (erectile dysfunction), Disp-30 tablet, R-5, Local Print           Allergies   Allergies   Allergen Reactions    No Known Drug Allergy

## 2023-11-07 NOTE — PROGRESS NOTES
A&Ox3-4, forgetful. Neuros intact. Ambulated around nurse's station and in hallway, tolerated well. Bolus given for orthos. Denies dizziness/lightheadedeness. Denies pain/discomfort/SOB/chest pain. Neuro saw, see recs. Pt spoke w/ CC and declined HH RN to help w/ medication set-up (see note). Reviewed discharge recommendations and AVS. Encouraged pt to follow up w/ PCP in addition to following up w/ neurology. Reviewed medications and dosages. Pt and girlfriend able to teach back information. Both denied further questions/concerns. Encouraged good food and fluid intake upon discharge. Pt discharged home via girlfriend.

## 2023-11-07 NOTE — PROGRESS NOTES
Observation goals  PRIOR TO DISCHARGE       Comments:   -diagnostic tests and consults completed and resulted- NOT MET  -vital signs normal or at patient baseline- MET  -returns to baseline functional status- NOT MET  -safe disposition plan has been identified- NOT MET  Nurse to notify provider when observation goals have been met and patient is ready for discharge.
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
## 2023-11-07 NOTE — CONSULTS
Care Management Initial Consult    General Information  Assessment completed with: Leighton Villarreal  Type of CM/SW Visit: Initial Assessment    Primary Care Provider verified and updated as needed: Yes   Readmission within the last 30 days: no previous admission in last 30 days      Reason for Consult: discharge planning  Advance Care Planning: Advance Care Planning Reviewed: no concerns identified          Communication Assessment  Patient's communication style: spoken language (English or Bilingual)    Hearing Difficulty or Deaf: no        Cognitive  Cognitive/Neuro/Behavioral: .WDL except, orientation  Level of Consciousness: alert  Arousal Level: opens eyes spontaneously  Orientation: disoriented to, time, situation  Mood/Behavior: calm, cooperative  Best Language: 0 - No aphasia  Speech: clear, spontaneous, logical    Living Environment:   People in home: alone     Current living Arrangements: house      Able to return to prior arrangements: yes       Family/Social Support:  Care provided by: self  Provides care for: no one  Marital Status: Single  Significant Other, Children          Description of Support System:           Current Resources:   Patient receiving home care services: No     Community Resources: None  Equipment currently used at home:    Supplies currently used at home: None    Employment/Financial:  Employment Status: retired        Financial Concerns:             Does the patient's insurance plan have a 3 day qualifying hospital stay waiver?  Yes     Which insurance plan 3 day waiver is available? Alternative insurance waiver    Will the waiver be used for post-acute placement? No    Lifestyle & Psychosocial Needs:  Social Determinants of Health     Food Insecurity: Low Risk  (11/6/2023)    Food Insecurity     Within the past 12 months, did you worry that your food would run out before you got money to buy more?: No     Within the past 12 months, did the food you bought just not last and you  didn t have money to get more?: No   Depression: Not at risk (11/6/2023)    PHQ-2     PHQ-2 Score: 0   Housing Stability: Low Risk  (11/6/2023)    Housing Stability     Do you have housing? : Yes     Are you worried about losing your housing?: No   Tobacco Use: Medium Risk (11/6/2023)    Patient History     Smoking Tobacco Use: Former     Smokeless Tobacco Use: Never     Passive Exposure: Not on file   Financial Resource Strain: Low Risk  (11/6/2023)    Financial Resource Strain     Within the past 12 months, have you or your family members you live with been unable to get utilities (heat, electricity) when it was really needed?: No   Alcohol Use: Not on file   Transportation Needs: Low Risk  (11/6/2023)    Transportation Needs     Within the past 12 months, has lack of transportation kept you from medical appointments, getting your medicines, non-medical meetings or appointments, work, or from getting things that you need?: No   Physical Activity: Not on file   Interpersonal Safety: Low Risk  (11/6/2023)    Interpersonal Safety     Do you feel physically and emotionally safe where you currently live?: Yes     Within the past 12 months, have you been hit, slapped, kicked or otherwise physically hurt by someone?: No     Within the past 12 months, have you been humiliated or emotionally abused in other ways by your partner or ex-partner?: No   Stress: Not on file   Social Connections: Not on file       Functional Status:  Prior to admission patient needed assistance:   Dependent ADLs:: Independent  Dependent IADLs:: Independent       Mental Health Status:      No concerns    Chemical Dependency Status:      unknown          Values/Beliefs:  Spiritual, Cultural Beliefs, Ep[employer]alian Practices, Values that affect care: no               Additional Information:  -writer met with patient and girlfriend, Gi. Writer went over Medicare outpatient observation Notice. Patient understands he will have a co-pay.  -writer spoke to  "moisest regarding a Home care Nurse to come to his house to help with his medications, 'I don't think I need that, I have to be better about setting out my medications and they won't change anything while I am here, I have been on the same things for years>\" Writer explained that this would be covered under his Medicare Insurance and the extra support and guidance with his medications are very helpful to patient's. Patient is still refusing as \"My dad had home care and I not at that point.she can help me\" Patient then pointed to his girlfriend who does not appear to understand this either. Writer told writer to think it over as it really does help to have someone watching out for you in the community.  -will follow for discharge planning.   Violetta Núñez RN      "

## 2023-11-07 NOTE — PLAN OF CARE
Goal Outcome Evaluation:  [id number]  PRIMARY Concern: Encephalopathy likely related to medications. Hx seizure disorder.  SAFETY RISK Concerns (fall risk, behaviors, etc.): Fall risk, impulsive at times      Isolation/Type: None  Tests/Procedures for NEXT shift: N/A  Consults? (Pending/following, signed-off?) Neurology following  Where is patient from? (Home, TCU, etc.): Home, lives alone  Other Important info for NEXT shift: Neuros intact. Currenlty holding Keppra and Tegretol.   Anticipated DC date & active delays: Discharge pending lab values and confusion.    SUMMARY NOTE:  Orientation/Cognitive: A&Ox2, disoriented to time and situation.  Observation Goals (Met/ Not Met): Not Met  Mobility Level/Assist Equipment: Asst 1 gb  Antibiotics & Plan (IV/po, length of tx left): None  Pain Management: Denies  Tele/VS/O2: VSS, on RA  ABNL Lab/BG: Tegretol 17.2; Keppra 44.5  Diet: Mod carb, 2gm NA, low fat  Bowel/Bladder: Continent  Skin Concerns: None  Drains/Devices: PIV S.L.  Patient Stated Goal for Today: Rest and get out of here        Observation goals  PRIOR TO DISCHARGE       Comments:   -diagnostic tests and consults completed and resulted- NOT MET  -vital signs normal or at patient baseline- MET  -returns to baseline functional status- NOT MET  -safe disposition plan has been identified- NOT MET  Nurse to notify provider when observation goals have been met and patient is ready for discharge.

## 2023-11-07 NOTE — PROGRESS NOTES
[facility] EEG #  portable, ordered by Karla Carlton CNP.      Pt is awake, confused but can follow simple commands.   Noted poor memory.   Doesn't remember what he is told to do or not to do.   When I walked into ED room with monitor, he had crawled off gurney by himself and was standing up, wandering around room.   His wife is present.    I have told him to stay on gurney and if he needs help to call RN with call light.    No activations.

## 2023-11-07 NOTE — PLAN OF CARE
[id number]  PRIMARY Concern: Encephalopathy likely related to medications. Hx seizure disorder.  SAFETY RISK Concerns (fall risk, behaviors, etc.): Fall risk, impulsive at times      Isolation/Type: None  Tests/Procedures for NEXT shift: N/A  Consults? (Pending/following, signed-off?) Neurology following  Where is patient from? (Home, TCU, etc.): Home, lives alone  Other Important info for NEXT shift: Neuros intact. Currenlty holding Keppra and Tegretol.   Anticipated DC date & active delays: Discharge pending lab values and confusion.    SUMMARY NOTE:  Orientation/Cognitive: A&Ox2, disoriented to time and situation.  Observation Goals (Met/ Not Met): Not Met  Mobility Level/Assist Equipment: Asst 1 gb  Antibiotics & Plan (IV/po, length of tx left): None  Pain Management: Denies  Tele/VS/O2: VSS, on RA  ABNL Lab/BG: Tegretol 17.2; Keppra 44.5  Diet: Mod carb, 2gm NA, low fat  Bowel/Bladder: Continent  Skin Concerns: None  Drains/Devices: PIV S.L.  Patient Stated Goal for Today: Rest and get out of here

## 2023-11-07 NOTE — PLAN OF CARE
"Provider Notification    Notified Person: DO    Notified Person Name: Dr. Tyler    Notification Date/Time: 23    Notification Interaction: [employer] messaging/telephone    Purpose of Notification:   orthos: layin/63 HR77 sittin/75 HR 82 Standin/59 HR 95. asymptomatic     neuro note is in, says to \"continue on home regimen of antiepileptics\" and she unheld the keppra and tegretol, do you want me to give?     Orders Received: yes give If pt normally takes meds in AM. 500mL bolus for orthos (see EMAR)    Comments: Pt ambulated around unit, tolerated well    "

## 2023-11-07 NOTE — PROGRESS NOTES
Admission/Transfer from: ED  2 RN skin assessment completed. Yes  Significant findings include: None, skin looks good.  WOC Nurse Consult Ordered? No

## 2023-11-07 NOTE — PROGRESS NOTES
Care Management Discharge Note    Discharge Date: 11/07/2023       Discharge Disposition: Home    Discharge Services: None    Discharge DME: None    Discharge Transportation: family or friend will provide    Private pay costs discussed: Not applicable    Does the patient's insurance plan have a 3 day qualifying hospital stay waiver?  Yes    PAS Confirmation Code:    Patient/family educated on Medicare website which has current facility and service quality ratings: no    Education Provided on the Discharge Plan: Yes  Persons Notified of Discharge Plans: RN and MD  Patient/Family in Agreement with the Plan: yes    Handoff Referral Completed: Yes    Additional Information:  Patient discharged and refused to have a home care RN. No further needs were identified.    Violetta Núñez RN

## 2023-11-08 ENCOUNTER — PATIENT OUTREACH (OUTPATIENT)
Dept: CARE COORDINATION | Facility: CLINIC | Age: 68
End: 2023-11-08
Payer: COMMERCIAL

## 2023-11-08 DIAGNOSIS — N52.9 ERECTILE DYSFUNCTION, UNSPECIFIED ERECTILE DYSFUNCTION TYPE: ICD-10-CM

## 2023-11-08 LAB
ALBUMIN SERPL ELPH-MCNC: 4.3 G/DL (ref 3.7–5.1)
ALPHA1 GLOB SERPL ELPH-MCNC: 0.3 G/DL (ref 0.2–0.4)
ALPHA2 GLOB SERPL ELPH-MCNC: 0.8 G/DL (ref 0.5–0.9)
B-GLOBULIN SERPL ELPH-MCNC: 0.6 G/DL (ref 0.6–1)
GAMMA GLOB SERPL ELPH-MCNC: 0.9 G/DL (ref 0.7–1.6)
M PROTEIN SERPL ELPH-MCNC: 0 G/DL
PROT PATTERN SERPL ELPH-IMP: NORMAL
PROT PATTERN SERPL IFE-IMP: NORMAL

## 2023-11-08 RX ORDER — SILDENAFIL 100 MG/1
100 TABLET, FILM COATED ORAL DAILY PRN
Qty: 30 TABLET | Refills: 5 | OUTPATIENT
Start: 2023-11-08

## 2023-11-08 NOTE — PROGRESS NOTES
"Clinic Care Coordination Contact  [facility]: Post-Discharge Note  SITUATION                                                      Admission:    Admission Date: 11/06/23   Reason for Admission: Ataxia  Discharge:   Discharge Date: 11/07/23  Discharge Diagnosis: Ataxia    BACKGROUND                                                      Per hospital discharge summary and inpatient provider notes:Leighton Morales is a 68 year old male with a past medical history significant for seizure disorder, hyperlipidemia, thrombocytopenia who is being registered to observation on 11/06/23 for encephalopathy. Symptoms resolved with supportive care and patient discharged home.         ASSESSMENT           Discharge Assessment  How are you doing now that you are home?: \" I am feeling great, eating \"  How are your symptoms? (Red Flag symptoms escalate to triage hotline per guidelines): Improved  Do you feel your condition is stable enough to be safe at home until your provider visit?: Yes  Does the patient have their discharge instructions? : Yes  Does the patient have questions regarding their discharge instructions? : No  Were you started on any new medications or were there changes to any of your previous medications? : Yes  Does the patient have all of their medications?: Yes  Do you have questions regarding any of your medications? : No  Do you have all of your needed medical supplies or equipment (DME)?  (i.e. oxygen tank, CPAP, cane, etc.): Yes  Discharge follow-up appointment scheduled within 14 calendar days? : No    Post-op ([facility] Only)  If the patient had a surgery or procedure, do they have any questions for a nurse?: No             PLAN                                                      Outpatient Plan:Follow-up Appointments     Follow-up and recommended labs and tests       Follow up with neurology in a few weeks to discuss any changes to   medications        Future Appointments   Date Time Provider Department " Center   1/29/2024  4:00 PM Carmen Patton MD [facility]         For any urgent concerns, please contact our 24 hour nurse triage line: [phone] ([phone])         NADIA Sylvester

## 2023-11-24 DIAGNOSIS — N52.9 ERECTILE DYSFUNCTION, UNSPECIFIED ERECTILE DYSFUNCTION TYPE: ICD-10-CM

## 2023-11-26 DIAGNOSIS — G40.909 SEIZURE DISORDER (H): ICD-10-CM

## 2023-11-28 RX ORDER — SILDENAFIL 100 MG/1
100 TABLET, FILM COATED ORAL DAILY PRN
Qty: 30 TABLET | Refills: 5 | Status: SHIPPED | OUTPATIENT
Start: 2023-11-28

## 2023-11-28 NOTE — TELEPHONE ENCOUNTER
Pt as given paper rx at last appt as med not covered by insurance was was told to take Rx to [facility] or [facility] and ask pharmacist to run Rx through [facility] with private pa. Paper Rx reprinted. Mail to pt if lost his other Rx and luisa;l pt and tell him to bring Rx to one of those pharmacies after receives it in mail if doesn't already have Rx I originally gave him to bring to one of the local pharmacies mentioned where cost will be lower with GoodRx coupon the pharmacy can pull up for pt. Rx in [place] basket

## 2023-11-28 NOTE — TELEPHONE ENCOUNTER
Pt did bring in the original Rx to [email or web address] for the sildenafil so he is good does not need reprinted copy mailed to him. Also, updated flu and covid shot that was given to pt on 11/26/203.

## 2023-11-30 ENCOUNTER — LAB (OUTPATIENT)
Dept: LAB | Facility: CLINIC | Age: 68
End: 2023-11-30
Payer: COMMERCIAL

## 2023-11-30 DIAGNOSIS — R41.0 CONFUSION: ICD-10-CM

## 2023-11-30 DIAGNOSIS — E78.5 HYPERLIPIDEMIA LDL GOAL <100: ICD-10-CM

## 2023-11-30 DIAGNOSIS — Z12.5 SCREENING FOR PROSTATE CANCER: ICD-10-CM

## 2023-11-30 DIAGNOSIS — Z00.00 MEDICARE ANNUAL WELLNESS VISIT, SUBSEQUENT: ICD-10-CM

## 2023-11-30 DIAGNOSIS — D69.6 THROMBOCYTOPENIA (H): ICD-10-CM

## 2023-11-30 DIAGNOSIS — R56.9 CONVULSIONS, UNSPECIFIED CONVULSION TYPE (H): ICD-10-CM

## 2023-11-30 LAB
ALBUMIN SERPL BCG-MCNC: 4.4 G/DL (ref 3.5–5.2)
ALP SERPL-CCNC: 46 U/L (ref 40–150)
ALT SERPL W P-5'-P-CCNC: 30 U/L (ref 0–70)
ANION GAP SERPL CALCULATED.3IONS-SCNC: 12 MMOL/L (ref 7–15)
AST SERPL W P-5'-P-CCNC: 27 U/L (ref 0–45)
BILIRUB SERPL-MCNC: 0.4 MG/DL
BUN SERPL-MCNC: 13.1 MG/DL (ref 8–23)
CALCIUM SERPL-MCNC: 8.9 MG/DL (ref 8.8–10.2)
CHLORIDE SERPL-SCNC: 101 MMOL/L (ref 98–107)
CHOLEST SERPL-MCNC: 186 MG/DL
CREAT SERPL-MCNC: 0.99 MG/DL (ref 0.67–1.17)
DEPRECATED HCO3 PLAS-SCNC: 26 MMOL/L (ref 22–29)
EGFRCR SERPLBLD CKD-EPI 2021: 83 ML/MIN/1.73M2
ERYTHROCYTE [DISTWIDTH] IN BLOOD BY AUTOMATED COUNT: 12.1 % (ref 10–15)
FASTING STATUS PATIENT QL REPORTED: YES
GLUCOSE SERPL-MCNC: 95 MG/DL (ref 70–99)
GLUCOSE SERPL-MCNC: 95 MG/DL (ref 70–99)
HCT VFR BLD AUTO: 43.3 % (ref 40–53)
HDLC SERPL-MCNC: 69 MG/DL
HGB BLD-MCNC: 14.9 G/DL (ref 13.3–17.7)
LDLC SERPL CALC-MCNC: 95 MG/DL
MCH RBC QN AUTO: 31.2 PG (ref 26.5–33)
MCHC RBC AUTO-ENTMCNC: 34.4 G/DL (ref 31.5–36.5)
MCV RBC AUTO: 91 FL (ref 78–100)
NONHDLC SERPL-MCNC: 117 MG/DL
PLATELET # BLD AUTO: 135 10E3/UL (ref 150–450)
POTASSIUM SERPL-SCNC: 4.1 MMOL/L (ref 3.4–5.3)
PROT SERPL-MCNC: 7.4 G/DL (ref 6.4–8.3)
PSA SERPL DL<=0.01 NG/ML-MCNC: 2.08 NG/ML (ref 0–4.5)
RBC # BLD AUTO: 4.78 10E6/UL (ref 4.4–5.9)
SODIUM SERPL-SCNC: 139 MMOL/L (ref 135–145)
TRIGL SERPL-MCNC: 109 MG/DL
WBC # BLD AUTO: 4.4 10E3/UL (ref 4–11)

## 2023-11-30 PROCEDURE — G0103 PSA SCREENING: HCPCS

## 2023-11-30 PROCEDURE — 80061 LIPID PANEL: CPT

## 2023-11-30 PROCEDURE — 80053 COMPREHEN METABOLIC PANEL: CPT

## 2023-11-30 PROCEDURE — 85027 COMPLETE CBC AUTOMATED: CPT

## 2023-11-30 PROCEDURE — 36415 COLL VENOUS BLD VENIPUNCTURE: CPT

## 2023-11-30 RX ORDER — LEVETIRACETAM 1000 MG/1
TABLET ORAL
Qty: 95 TABLET | Refills: 0 | Status: SHIPPED | OUTPATIENT
Start: 2023-11-30 | End: 2024-01-25

## 2023-11-30 NOTE — TELEPHONE ENCOUNTER
levETIRAcetam (KEPPRA) 1000 MG tablet   270 tablet 2 3/4/2023       Last Office Visit : 3-  Future Office visit:  1-\    Labs completed on : 11-7-2023   Creatinine  0.67 - 1.17 mg/dL 1.03

## 2024-01-25 DIAGNOSIS — G40.909 SEIZURE SYNDROME (H): ICD-10-CM

## 2024-01-25 DIAGNOSIS — G40.119 PARTIAL EPILEPSY WITH INTRACTABLE EPILEPSY (H): ICD-10-CM

## 2024-01-25 DIAGNOSIS — G40.909 SEIZURE DISORDER (H): ICD-10-CM

## 2024-01-25 RX ORDER — LEVETIRACETAM 1000 MG/1
TABLET ORAL
Qty: 95 TABLET | Refills: 0 | Status: SHIPPED | OUTPATIENT
Start: 2024-01-25 | End: 2024-01-29

## 2024-01-25 NOTE — TELEPHONE ENCOUNTER
carBAMazepine (TEGRETOL XR) 400 MG 12 hr tablet 360 tablet 2 3/13/2023       Anti-Seizure Meds Protocol  Uwouvo0401/25/2024 08:30 AM   Protocol Details Review Authorizing provider's last note.    Normal CBC on file in past 26 months    Normal platelet count on file in past 26 months    Carbamazepine level within therapeutic range in last 26 months        Component      Latest Ref Rn 11/30/2023  10:40 AM   WBC      4.0 - 11.0 10e3/uL 4.4    RBC Count      4.40 - 5.90 10e6/uL 4.78    Hemoglobin      13.3 - 17.7 g/dL 14.9    Hematocrit      40.0 - 53.0 % 43.3    MCV      78 - 100 fL 91    MCH      26.5 - 33.0 pg 31.2    MCHC      31.5 - 36.5 g/dL 34.4    RDW      10.0 - 15.0 % 12.1    Platelet Count      150 - 450 10e3/uL 135 (L)       Component      Latest Ref Rn 11/6/2023  3:24 PM   Carbamazepine      4.0 - 12.0 ug/mL 17.2 (HH)          levETIRAcetam (KEPPRA) 1000 MG tablet 95 tablet 0 11/30/2023   10/30/23-CREATININE--0.99  NORMAL        Last Office Visit: 3/13/23  Future Office visit:   1/29/24    Routing refill request to provider for review/approval because:  Lab levels abnormal FOR TEGRETOL      Indiana Ball RN  [facility] Red Flag Triage/MRT

## 2024-01-29 ENCOUNTER — OFFICE VISIT (OUTPATIENT)
Dept: NEUROLOGY | Facility: CLINIC | Age: 69
End: 2024-01-29
Payer: COMMERCIAL

## 2024-01-29 VITALS
TEMPERATURE: 97.1 F | DIASTOLIC BLOOD PRESSURE: 83 MMHG | HEART RATE: 79 BPM | OXYGEN SATURATION: 99 % | SYSTOLIC BLOOD PRESSURE: 158 MMHG

## 2024-01-29 DIAGNOSIS — G40.909 NONINTRACTABLE EPILEPSY WITHOUT STATUS EPILEPTICUS, UNSPECIFIED EPILEPSY TYPE (H): Primary | ICD-10-CM

## 2024-01-29 DIAGNOSIS — G40.909 SEIZURE DISORDER (H): ICD-10-CM

## 2024-01-29 DIAGNOSIS — G40.119 PARTIAL EPILEPSY WITH INTRACTABLE EPILEPSY (H): ICD-10-CM

## 2024-01-29 DIAGNOSIS — G40.909 SEIZURE SYNDROME (H): ICD-10-CM

## 2024-01-29 RX ORDER — LEVETIRACETAM 1000 MG/1
TABLET ORAL
Qty: 270 TABLET | Refills: 3 | Status: SHIPPED | OUTPATIENT
Start: 2024-01-29 | End: 2024-08-26

## 2024-01-29 RX ORDER — CARBAMAZEPINE 400 MG/1
800 TABLET, EXTENDED RELEASE ORAL 2 TIMES DAILY
Qty: 360 TABLET | Refills: 1 | Status: ON HOLD | OUTPATIENT
Start: 2024-01-29 | End: 2024-03-08

## 2024-01-29 ASSESSMENT — PAIN SCALES - GENERAL: PAINLEVEL: NO PAIN (0)

## 2024-01-29 NOTE — PROGRESS NOTES
"Attestation:   I, Carmen Patton MD,  personally examined and evaluated this patient on 1/29/2024. I discussed the complex patient care management with trainee and care team, and agree with the assessment and plan of care as documented in the trainee's note of January 29, 2024, BP (!) 158/83 (BP Location: Right arm, Patient Position: Sitting, Cuff Size: Adult Large)   Pulse 79   Temp 97.1  F (36.2  C) (Temporal)   SpO2 99% .  I personally reviewed medications, labs, imaging reports. I spent 30 minutes with the patient. During this time key medical decisions were made with review of medical chart prior to visit, visit with patient, counseling/education, and post visit work, including documentation on the day of visit. I addressed all questions the patient/caregiver raised in regards to the patient's medical care. This note was created with voice recognition software. Inadvertent grammatical errors, typographical errors, and \"sound a like\" substitutions may occur due to limitations of the software.  Read the note carefully and apply context when erroneous substitutions have occurred. Thank you.     Key findings are as follows:   Gi (partner). His last seizure 1999, he had ER visit with carbamazepine toxicity because he took medications on \"empty stomach\".  We reviewed his carbamazepine prescriptions and it seems he has been taking 800 mg twice a day carbamazepine extended release 400 mg 12-hour tablets for many years.   in March 2022 was lowered to 400 mg twice a day I suspect after a hospitalization due to toxicity.  We put him back to his prior dose because he was seizure-free on this dose for many years. He has taken carbamazepine 1600 mg per day since 2015.   His carbamazepine was 17.2 (11/6/2023), however 3/13/2023 his level was 6.6. In March 2022 his level was 12.3 and he had side effects.   While he thinks it is possible that there might have been some medication errors, he is using a pillbox in an effort " to take the medications regularly.  He has taken 800 mg twice a day for many years and remained seizure-free for many years.   Neuro Exam:   BP (!) 158/83 (BP Location: Right arm, Patient Position: Sitting, Cuff Size: Adult Large)   Pulse 79   Temp 97.1  F (36.2  C) (Temporal)   SpO2 99%    Wt Readings from Last 4 Encounters:   11/06/23 177 lb 7.5 oz (80.5 kg)   11/06/23 185 lb 11.2 oz (84.2 kg)   03/13/23 189 lb (85.7 kg)   09/12/22 192 lb 6.4 oz (87.3 kg)     Alert, orientated, speech is fluent, face symmetric, tongue midline, extra ocular movements in tact, no pronator drip, arm circumduction is symmetric, finger to nose normal.   Impression:  Localization-related epilepsy, controlled, etiology right cavernous malformation, status post lesionectomy in 1995. His last seizure was 1999. Mr. Morales requires same mft ([facility]) for carbamazepine.   Plan:   BP is high, please see primary care provider about HTN  Continue Carbamazepine  mg twice a day  (2 tablet twice a day) - Mr. Morales requires same mft ([facility]) for carbamazepine.   Check carbamazepine level   Follow up  1 year   Carmen Patton MD   Epilepsy Attending   [phone]

## 2024-01-29 NOTE — PATIENT INSTRUCTIONS
BP is high, please see primary care provider about HTN  Continue Carbamazepine  mg twice a day  (2 tablet twice a day) - Mr. Morales requires same mft (RISING) for carbamazepine.   Check carbamazepine level     Carmen Patton MD

## 2024-01-29 NOTE — LETTER
"2024       RE: Leighton Morales  : 1955   MRN: [id number]        Dear Colleague,    Thank you for referring your patient, Leighton Morales, to the [facility] at [facility]. Please see a copy of my visit note below.    [facility] Progress Note    Patient:  Leighton Morales  :  1955   Age:  67 year old   Today's Office Visit:  3/13/2023      Interval History:   Since last visit he is stable. Patient had a hospitalization on  where he was worked up for \"acute encephalopathy\". Patient reports the etiology of the events is that he want 18 hours without eating and took his medications on an empty stomach. Patient was at visit with his partner, Gi, who states symptoms were similar to prior events when medications level was too high. Per chart review, carbamazepine level was 17.2 on . Patient thinks there could have been the possibility that he took too much of his medication that day.  Per chart review, patient did not have any findings on brain MRI. Patient had repeat EEG which showed \"Abnormal study suggestive of moderate encephalopathy with amplitude asymmetry right temporal region. No electrographic seizures or epileptiform discharges were recorded \"     Patient feels like his mood has been alright, he is still quite upset from the passing of his father from 2022.  Patient does not report any AE from medications. Feels like he is on a good plan. Reports that last seizure might have been .  Currently, no AE from antiepileptic drugs, no double vision, no mood changes, no nausea, no vomiting, no abdominal pain, no rashes.      MEDICATIONS:   1.  Levetiracetam 1500 mg am and 1500 mg pm    2.  Carbamazepine  mg twice a day       EXAMINATION BP (!) 158/83 (BP Location: Right arm, Patient Position: Sitting, Cuff Size: Adult Large)   Pulse 79   Temp 97.1  F (36.2  C) (Temporal)   SpO2 99%      Wt Readings " "from Last 4 Encounters:   11/06/23 177 lb 7.5 oz (80.5 kg)   11/06/23 185 lb 11.2 oz (84.2 kg)   03/13/23 189 lb (85.7 kg)   09/12/22 192 lb 6.4 oz (87.3 kg)     BP (!) 158/83 (BP Location: Right arm, Patient Position: Sitting, Cuff Size: Adult Large)   Pulse 79   Temp 97.1  F (36.2  C) (Temporal)   SpO2 99%  Alert, orientated (date, place, age) speech is fluent, face is symmetric, extra-ocular movement in tact, no focal deficits noted.Gait is stable.    Impression and Plan received by Dr. Carmen Patton on 1/24/24    Impression:  \"Localization-related epilepsy, controlled, etiology right cavernous malformation, status post lesionectomy in 1995. His last seizure was 1999. Mr. Morales requires same mft (RISING) for carbamazepine.   Plan:   BP is high, please see primary care provider about HTN  Continue Carbamazepine  mg twice a day  (2 tablet twice a day) - Mr. Morales requires same mft (RISING) for carbamazepine.   Check carbamazepine level \"    Iankimberlyn Richmond  MS3               Attestation:   I, Carmen Patton MD,  personally examined and evaluated this patient on 1/29/2024. I discussed the complex patient care management with trainee and care team, and agree with the assessment and plan of care as documented in the trainee's note of January 29, 2024, BP (!) 158/83 (BP Location: Right arm, Patient Position: Sitting, Cuff Size: Adult Large)   Pulse 79   Temp 97.1  F (36.2  C) (Temporal)   SpO2 99% .  I personally reviewed medications, labs, imaging reports. I spent 30 minutes with the patient. During this time key medical decisions were made with review of medical chart prior to visit, visit with patient, counseling/education, and post visit work, including documentation on the day of visit. I addressed all questions the patient/caregiver raised in regards to the patient's medical care. This note was created with voice recognition software. Inadvertent grammatical errors, typographical errors, and \"sound a like\" " "substitutions may occur due to limitations of the software.  Read the note carefully and apply context when erroneous substitutions have occurred. Thank you.     Key findings are as follows:   Gi (partner). His last seizure 1999, he had ER visit with carbamazepine toxicity because he took medications on \"empty stomach\".  We reviewed his carbamazepine prescriptions and it seems he has been taking 800 mg twice a day carbamazepine extended release 400 mg 12-hour tablets for many years.   in March 2022 was lowered to 400 mg twice a day I suspect after a hospitalization due to toxicity.  We put him back to his prior dose because he was seizure-free on this dose for many years. He has taken carbamazepine 1600 mg per day since 2015.   His carbamazepine was 17.2 (11/6/2023), however 3/13/2023 his level was 6.6. In March 2022 his level was 12.3 and he had side effects.   While he thinks it is possible that there might have been some medication errors, he is using a pillbox in an effort to take the medications regularly.  He has taken 800 mg twice a day for many years and remained seizure-free for many years.   Neuro Exam:   BP (!) 158/83 (BP Location: Right arm, Patient Position: Sitting, Cuff Size: Adult Large)   Pulse 79   Temp 97.1  F (36.2  C) (Temporal)   SpO2 99%    Wt Readings from Last 4 Encounters:   11/06/23 177 lb 7.5 oz (80.5 kg)   11/06/23 185 lb 11.2 oz (84.2 kg)   03/13/23 189 lb (85.7 kg)   09/12/22 192 lb 6.4 oz (87.3 kg)     Alert, orientated, speech is fluent, face symmetric, tongue midline, extra ocular movements in tact, no pronator drip, arm circumduction is symmetric, finger to nose normal.   Impression:  Localization-related epilepsy, controlled, etiology right cavernous malformation, status post lesionectomy in 1995. His last seizure was 1999. Mr. Morales requires same mft (RISING) for carbamazepine.   Plan:   BP is high, please see primary care provider about HTN  Continue Carbamazepine  mg " twice a day  (2 tablet twice a day) - Mr. Morales requires same mft (RISING) for carbamazepine.   Check carbamazepine level   Follow up  1 year         Again, thank you for allowing me to participate in the care of your patient.      Sincerely,    Carmen Patton MD

## 2024-01-29 NOTE — PROGRESS NOTES
"[facility] Progress Note    Patient:  Leighton Morales  :  1955   Age:  67 year old   Today's Office Visit:  3/13/2023      Interval History:   Since last visit he is stable. Patient had a hospitalization on  where he was worked up for \"acute encephalopathy\". Patient reports the etiology of the events is that he want 18 hours without eating and took his medications on an empty stomach. Patient was at visit with his partner, Gi, who states symptoms were similar to prior events when medications level was too high. Per chart review, carbamazepine level was 17.2 on . Patient thinks there could have been the possibility that he took too much of his medication that day.  Per chart review, patient did not have any findings on brain MRI. Patient had repeat EEG which showed \"Abnormal study suggestive of moderate encephalopathy with amplitude asymmetry right temporal region. No electrographic seizures or epileptiform discharges were recorded \"     Patient feels like his mood has been alright, he is still quite upset from the passing of his father from 2022.  Patient does not report any AE from medications. Feels like he is on a good plan. Reports that last seizure might have been .  Currently, no AE from antiepileptic drugs, no double vision, no mood changes, no nausea, no vomiting, no abdominal pain, no rashes.      MEDICATIONS:   1.  Levetiracetam 1500 mg am and 1500 mg pm    2.  Carbamazepine  mg twice a day       EXAMINATION BP (!) 158/83 (BP Location: Right arm, Patient Position: Sitting, Cuff Size: Adult Large)   Pulse 79   Temp 97.1  F (36.2  C) (Temporal)   SpO2 99%      Wt Readings from Last 4 Encounters:   23 177 lb 7.5 oz (80.5 kg)   23 185 lb 11.2 oz (84.2 kg)   23 189 lb (85.7 kg)   22 192 lb 6.4 oz (87.3 kg)     BP (!) 158/83 (BP Location: Right arm, Patient Position: Sitting, Cuff Size: Adult Large)   Pulse 79   Temp 97.1  F (36.2  C) " "(Temporal)   SpO2 99%  Alert, orientated (date, place, age) speech is fluent, face is symmetric, extra-ocular movement in tact, no focal deficits noted.Gait is stable.    Impression and Plan received by Dr. Carmen Patton on 1/24/24    Impression:  \"Localization-related epilepsy, controlled, etiology right cavernous malformation, status post lesionectomy in 1995. His last seizure was 1999. Mr. Morales requires same mft (RISING) for carbamazepine.   Plan:   BP is high, please see primary care provider about HTN  Continue Carbamazepine  mg twice a day  (2 tablet twice a day) - Mr. Morales requires same mft (RISING) for carbamazepine.   Check carbamazepine level \"    Ian Richmond  MS3             "

## 2024-02-06 ENCOUNTER — TELEPHONE (OUTPATIENT)
Dept: NEUROLOGY | Facility: CLINIC | Age: 69
End: 2024-02-06

## 2024-02-07 NOTE — LETTER
[facility]  [address] [address]  [phone]      12/22/2018       [place] Mor[place]  [id number] [address] [place] [id number]        Dear Leighton,  Here are your most recent lab results. Unless commented on below, mild variation of results  outside the normal range are not clinically signicant.    Resulted Orders   Iron and iron binding capacity   Result Value Ref Range    Iron 99 35 - 180 ug/dL    Iron Binding Cap 268 240 - 430 ug/dL    Iron Saturation Index 37 15 - 46 %   Testosterone Free and Total   Result Value Ref Range    Testosterone Total 730 240 - 950 ng/dL      Comment:      This test was developed and its performance characteristics determined by the   [facility],  Special Chemistry Laboratory. It has   not been cleared or approved by the FDA. The laboratory is regulated under   CLIA as qualified to perform high-complexity testing. This test is used for   clinical purposes. It should not be regarded as investigational or for   research.      Sex Hormone Binding Globulin 80 11 - 80 nmol/L    Free Testosterone Calculated 8.56 4.7 - 24.4 ng/dL   Ferritin   Result Value Ref Range    Ferritin 167 26 - 388 ng/mL   HIV Antigen Antibody Combo   Result Value Ref Range    HIV Antigen Antibody Combo Nonreactive NR^Nonreactive          Comment:      HIV-1 p24 Ag & HIV-1/HIV-2 Ab Not Detected       Iron and Testosterone lab results were normal.  No evidence of HIV infection.  Continue management plans  as discussed at your most recent appointment.  With regards to your restless leg symptoms, if they remain bothersome, then I would discuss with your neurologist to confirm that he/she does not think they are related to focal seizure activity (which may not cause confusion afterwards) and, if not felt to be related to seizure activity,  the possibility of trying medications like Requip or Mirapex to suppress them    If you have further  questions/concerns regarding the results, I would ask that you bring them to your next follow-up appointment with me and I would be happy to review them with you further.      Sincerely,      Los Mcelroy MD  Internal Medicine       Detail Level: Zone Detail Level: Detailed

## 2024-02-14 ENCOUNTER — TELEPHONE (OUTPATIENT)
Dept: NEUROLOGY | Facility: CLINIC | Age: 69
End: 2024-02-14

## 2024-02-14 NOTE — TELEPHONE ENCOUNTER
What is the concern that needs to be addressed by a nurse?     Need additional information needed for PA on medication Carbamazepin 400mg tabs ER. ref# [id number]. Needs call back by tomorrow as case will  2-15-24 @9:30AM    May a detailed message be left on voicemail? NO    Date of last office visit: 24    Message routed to: [facility] rn pool

## 2024-03-05 ENCOUNTER — APPOINTMENT (OUTPATIENT)
Dept: CT IMAGING | Facility: CLINIC | Age: 69
DRG: 082 | End: 2024-03-05
Attending: EMERGENCY MEDICINE
Payer: COMMERCIAL

## 2024-03-05 ENCOUNTER — HOSPITAL ENCOUNTER (INPATIENT)
Facility: CLINIC | Age: 69
LOS: 2 days | Discharge: NURSING, REHAB OR LONG-TERM CARE | DRG: 082 | End: 2024-03-08
Attending: EMERGENCY MEDICINE | Admitting: STUDENT IN AN ORGANIZED HEALTH CARE EDUCATION/TRAINING PROGRAM
Payer: COMMERCIAL

## 2024-03-05 DIAGNOSIS — G40.909 SEIZURE SYNDROME (H): ICD-10-CM
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
## 2024-03-05 DIAGNOSIS — Z23 NEED FOR DIPHTHERIA-TETANUS-PERTUSSIS (TDAP) VACCINE: ICD-10-CM

## 2024-03-05 DIAGNOSIS — E78.2 MIXED HYPERLIPIDEMIA: ICD-10-CM

## 2024-03-05 DIAGNOSIS — I60.9 SUBARACHNOID HEMORRHAGE (H): ICD-10-CM

## 2024-03-05 DIAGNOSIS — F41.9 ANXIETY: ICD-10-CM

## 2024-03-05 DIAGNOSIS — G40.119 PARTIAL EPILEPSY WITH INTRACTABLE EPILEPSY (H): ICD-10-CM

## 2024-03-05 DIAGNOSIS — R26.81 UNSTEADINESS: ICD-10-CM

## 2024-03-05 DIAGNOSIS — R41.0 CONFUSION: ICD-10-CM

## 2024-03-05 DIAGNOSIS — R41.89 COGNITIVE IMPAIRMENT: Primary | ICD-10-CM

## 2024-03-05 LAB
ALBUMIN SERPL BCG-MCNC: 4.4 G/DL (ref 3.5–5.2)
ALBUMIN UR-MCNC: 30 MG/DL
ALP SERPL-CCNC: 50 U/L (ref 40–150)
ALT SERPL W P-5'-P-CCNC: 18 U/L (ref 0–70)
AMMONIA PLAS-SCNC: 17 UMOL/L (ref 16–60)
ANION GAP SERPL CALCULATED.3IONS-SCNC: 13 MMOL/L (ref 7–15)
APPEARANCE UR: CLEAR
AST SERPL W P-5'-P-CCNC: 21 U/L (ref 0–45)
ATRIAL RATE - MUSE: 70 BPM
BASOPHILS # BLD AUTO: 0 10E3/UL (ref 0–0.2)
BASOPHILS NFR BLD AUTO: 1 %
BILIRUB SERPL-MCNC: 0.2 MG/DL
BILIRUB UR QL STRIP: NEGATIVE
BUN SERPL-MCNC: 10.6 MG/DL (ref 8–23)
CALCIUM SERPL-MCNC: 9 MG/DL (ref 8.8–10.2)
CARBAMAZEPINE SERPL-MCNC: 16.9 UG/ML (ref 4–12)
CHLORIDE SERPL-SCNC: 101 MMOL/L (ref 98–107)
COLOR UR AUTO: YELLOW
CREAT SERPL-MCNC: 1.06 MG/DL (ref 0.67–1.17)
DEPRECATED HCO3 PLAS-SCNC: 25 MMOL/L (ref 22–29)
DIASTOLIC BLOOD PRESSURE - MUSE: NORMAL MMHG
EGFRCR SERPLBLD CKD-EPI 2021: 76 ML/MIN/1.73M2
EOSINOPHIL # BLD AUTO: 0.1 10E3/UL (ref 0–0.7)
EOSINOPHIL NFR BLD AUTO: 1 %
ERYTHROCYTE [DISTWIDTH] IN BLOOD BY AUTOMATED COUNT: 12.1 % (ref 10–15)
ETHANOL SERPL-MCNC: <0.01 G/DL
GLUCOSE SERPL-MCNC: 96 MG/DL (ref 70–99)
GLUCOSE UR STRIP-MCNC: NEGATIVE MG/DL
HCT VFR BLD AUTO: 40.4 % (ref 40–53)
HGB BLD-MCNC: 13.8 G/DL (ref 13.3–17.7)
HGB UR QL STRIP: NEGATIVE
IMM GRANULOCYTES # BLD: 0 10E3/UL
IMM GRANULOCYTES NFR BLD: 1 %
INTERPRETATION ECG - MUSE: NORMAL
KETONES UR STRIP-MCNC: 10 MG/DL
LEUKOCYTE ESTERASE UR QL STRIP: NEGATIVE
LEVETIRACETAM SERPL-MCNC: 33 ΜG/ML (ref 10–40)
LYMPHOCYTES # BLD AUTO: 0.9 10E3/UL (ref 0.8–5.3)
LYMPHOCYTES NFR BLD AUTO: 14 %
MAGNESIUM SERPL-MCNC: 2.1 MG/DL (ref 1.7–2.3)
MCH RBC QN AUTO: 30.9 PG (ref 26.5–33)
MCHC RBC AUTO-ENTMCNC: 34.2 G/DL (ref 31.5–36.5)
MCV RBC AUTO: 90 FL (ref 78–100)
MONOCYTES # BLD AUTO: 0.4 10E3/UL (ref 0–1.3)
MONOCYTES NFR BLD AUTO: 7 %
MUCOUS THREADS #/AREA URNS LPF: PRESENT /LPF
NEUTROPHILS # BLD AUTO: 4.6 10E3/UL (ref 1.6–8.3)
NEUTROPHILS NFR BLD AUTO: 76 %
NITRATE UR QL: NEGATIVE
NRBC # BLD AUTO: 0 10E3/UL
NRBC BLD AUTO-RTO: 0 /100
P AXIS - MUSE: 52 DEGREES
PH UR STRIP: 6 [PH] (ref 5–7)
PLATELET # BLD AUTO: 161 10E3/UL (ref 150–450)
POTASSIUM SERPL-SCNC: 3.4 MMOL/L (ref 3.4–5.3)
PR INTERVAL - MUSE: 204 MS
PROT SERPL-MCNC: 7.2 G/DL (ref 6.4–8.3)
QRS DURATION - MUSE: 96 MS
QT - MUSE: 374 MS
QTC - MUSE: 403 MS
R AXIS - MUSE: -35 DEGREES
RBC # BLD AUTO: 4.47 10E6/UL (ref 4.4–5.9)
RBC URINE: <1 /HPF
SODIUM SERPL-SCNC: 139 MMOL/L (ref 135–145)
SP GR UR STRIP: 1.04 (ref 1–1.03)
SQUAMOUS EPITHELIAL: <1 /HPF
SYSTOLIC BLOOD PRESSURE - MUSE: NORMAL MMHG
T AXIS - MUSE: 52 DEGREES
TROPONIN T SERPL HS-MCNC: 11 NG/L
UROBILINOGEN UR STRIP-MCNC: NORMAL MG/DL
VENTRICULAR RATE- MUSE: 70 BPM
WBC # BLD AUTO: 6.1 10E3/UL (ref 4–11)
WBC URINE: 2 /HPF

## 2024-03-05 PROCEDURE — 250N000013 HC RX MED GY IP 250 OP 250 PS 637: Performed by: STUDENT IN AN ORGANIZED HEALTH CARE EDUCATION/TRAINING PROGRAM

## 2024-03-05 PROCEDURE — 250N000011 HC RX IP 250 OP 636: Performed by: EMERGENCY MEDICINE

## 2024-03-05 PROCEDURE — 83921 ORGANIC ACID SINGLE QUANT: CPT | Performed by: PSYCHIATRY & NEUROLOGY

## 2024-03-05 PROCEDURE — G0378 HOSPITAL OBSERVATION PER HR: HCPCS

## 2024-03-05 PROCEDURE — 90715 TDAP VACCINE 7 YRS/> IM: CPT | Performed by: EMERGENCY MEDICINE

## 2024-03-05 PROCEDURE — 84484 ASSAY OF TROPONIN QUANT: CPT | Performed by: EMERGENCY MEDICINE

## 2024-03-05 PROCEDURE — 82077 ASSAY SPEC XCP UR&BREATH IA: CPT | Performed by: EMERGENCY MEDICINE

## 2024-03-05 PROCEDURE — 70450 CT HEAD/BRAIN W/O DYE: CPT

## 2024-03-05 PROCEDURE — 85025 COMPLETE CBC W/AUTO DIFF WBC: CPT | Performed by: EMERGENCY MEDICINE

## 2024-03-05 PROCEDURE — 99285 EMERGENCY DEPT VISIT HI MDM: CPT | Mod: 25

## 2024-03-05 PROCEDURE — 70486 CT MAXILLOFACIAL W/O DYE: CPT

## 2024-03-05 PROCEDURE — 83735 ASSAY OF MAGNESIUM: CPT | Performed by: EMERGENCY MEDICINE

## 2024-03-05 PROCEDURE — 80156 ASSAY CARBAMAZEPINE TOTAL: CPT | Performed by: EMERGENCY MEDICINE

## 2024-03-05 PROCEDURE — 80177 DRUG SCRN QUAN LEVETIRACETAM: CPT | Performed by: EMERGENCY MEDICINE

## 2024-03-05 PROCEDURE — 90471 IMMUNIZATION ADMIN: CPT | Performed by: EMERGENCY MEDICINE

## 2024-03-05 PROCEDURE — 99223 1ST HOSP IP/OBS HIGH 75: CPT | Performed by: STUDENT IN AN ORGANIZED HEALTH CARE EDUCATION/TRAINING PROGRAM

## 2024-03-05 PROCEDURE — 93005 ELECTROCARDIOGRAM TRACING: CPT

## 2024-03-05 PROCEDURE — 36415 COLL VENOUS BLD VENIPUNCTURE: CPT | Performed by: EMERGENCY MEDICINE

## 2024-03-05 PROCEDURE — 81001 URINALYSIS AUTO W/SCOPE: CPT | Performed by: EMERGENCY MEDICINE

## 2024-03-05 PROCEDURE — 82140 ASSAY OF AMMONIA: CPT | Performed by: EMERGENCY MEDICINE

## 2024-03-05 PROCEDURE — 72125 CT NECK SPINE W/O DYE: CPT

## 2024-03-05 PROCEDURE — 80053 COMPREHEN METABOLIC PANEL: CPT | Performed by: EMERGENCY MEDICINE

## 2024-03-05 RX ORDER — ONDANSETRON 4 MG/1
4 TABLET, ORALLY DISINTEGRATING ORAL EVERY 6 HOURS PRN
Status: DISCONTINUED | OUTPATIENT
Start: 2024-03-05 | End: 2024-03-08 | Stop reason: HOSPADM

## 2024-03-05 RX ORDER — AMOXICILLIN 250 MG
1 CAPSULE ORAL 2 TIMES DAILY PRN
Status: DISCONTINUED | OUTPATIENT
Start: 2024-03-05 | End: 2024-03-08 | Stop reason: HOSPADM

## 2024-03-05 RX ORDER — ACETAMINOPHEN 650 MG/1
650 SUPPOSITORY RECTAL EVERY 4 HOURS PRN
Status: DISCONTINUED | OUTPATIENT
Start: 2024-03-05 | End: 2024-03-08 | Stop reason: HOSPADM

## 2024-03-05 RX ORDER — ONDANSETRON 2 MG/ML
4 INJECTION INTRAMUSCULAR; INTRAVENOUS EVERY 6 HOURS PRN
Status: DISCONTINUED | OUTPATIENT
Start: 2024-03-05 | End: 2024-03-08 | Stop reason: HOSPADM

## 2024-03-05 RX ORDER — AMOXICILLIN 250 MG
2 CAPSULE ORAL 2 TIMES DAILY PRN
Status: DISCONTINUED | OUTPATIENT
Start: 2024-03-05 | End: 2024-03-08 | Stop reason: HOSPADM

## 2024-03-05 RX ORDER — ROSUVASTATIN CALCIUM 20 MG/1
20 TABLET, COATED ORAL DAILY
Status: DISCONTINUED | OUTPATIENT
Start: 2024-03-05 | End: 2024-03-08 | Stop reason: HOSPADM

## 2024-03-05 RX ORDER — LEVETIRACETAM 500 MG/1
1500 TABLET ORAL 2 TIMES DAILY
Status: DISCONTINUED | OUTPATIENT
Start: 2024-03-05 | End: 2024-03-08 | Stop reason: HOSPADM

## 2024-03-05 RX ORDER — CARBAMAZEPINE 400 MG/1
800 TABLET, EXTENDED RELEASE ORAL 2 TIMES DAILY
Status: DISCONTINUED | OUTPATIENT
Start: 2024-03-05 | End: 2024-03-06

## 2024-03-05 RX ORDER — ACETAMINOPHEN 325 MG/1
650 TABLET ORAL EVERY 4 HOURS PRN
Status: DISCONTINUED | OUTPATIENT
Start: 2024-03-05 | End: 2024-03-08 | Stop reason: HOSPADM

## 2024-03-05 RX ADMIN — CARBAMAZEPINE 800 MG: 400 TABLET, EXTENDED RELEASE ORAL at 22:31

## 2024-03-05 RX ADMIN — LEVETIRACETAM 1500 MG: 500 TABLET, FILM COATED ORAL at 22:32

## 2024-03-05 RX ADMIN — CLOSTRIDIUM TETANI TOXOID ANTIGEN (FORMALDEHYDE INACTIVATED), CORYNEBACTERIUM DIPHTHERIAE TOXOID ANTIGEN (FORMALDEHYDE INACTIVATED), BORDETELLA PERTUSSIS TOXOID ANTIGEN (GLUTARALDEHYDE INACTIVATED), BORDETELLA PERTUSSIS FILAMENTOUS HEMAGGLUTININ ANTIGEN (FORMALDEHYDE INACTIVATED), BORDETELLA PERTUSSIS PERTACTIN ANTIGEN, AND BORDETELLA PERTUSSIS FIMBRIAE 2/3 ANTIGEN 0.5 ML: 5; 2; 2.5; 5; 3; 5 INJECTION, SUSPENSION INTRAMUSCULAR at 21:02

## 2024-03-05 RX ADMIN — ROSUVASTATIN CALCIUM 20 MG: 20 TABLET, FILM COATED ORAL at 22:33

## 2024-03-05 ASSESSMENT — ACTIVITIES OF DAILY LIVING (ADL)
ADLS_ACUITY_SCORE: 37

## 2024-03-06 ENCOUNTER — APPOINTMENT (OUTPATIENT)
Dept: OCCUPATIONAL THERAPY | Facility: CLINIC | Age: 69
DRG: 082 | End: 2024-03-06
Attending: INTERNAL MEDICINE
Payer: COMMERCIAL

## 2024-03-06 ENCOUNTER — HOSPITAL ENCOUNTER (OUTPATIENT)
Dept: NEUROLOGY | Facility: CLINIC | Age: 69
Setting detail: OBSERVATION
Discharge: HOME | DRG: 082 | End: 2024-03-06
Attending: PSYCHIATRY & NEUROLOGY
Payer: COMMERCIAL

## 2024-03-06 ENCOUNTER — TELEPHONE (OUTPATIENT)
Dept: NEUROLOGY | Facility: CLINIC | Age: 69
End: 2024-03-06

## 2024-03-06 ENCOUNTER — APPOINTMENT (OUTPATIENT)
Dept: CT IMAGING | Facility: CLINIC | Age: 69
DRG: 082 | End: 2024-03-06
Attending: STUDENT IN AN ORGANIZED HEALTH CARE EDUCATION/TRAINING PROGRAM
Payer: COMMERCIAL

## 2024-03-06 ENCOUNTER — APPOINTMENT (OUTPATIENT)
Dept: MRI IMAGING | Facility: CLINIC | Age: 69
DRG: 082 | End: 2024-03-06
Attending: PSYCHIATRY & NEUROLOGY
Payer: COMMERCIAL

## 2024-03-06 ENCOUNTER — DOCUMENTATION ONLY (OUTPATIENT)
Dept: OTHER | Facility: CLINIC | Age: 69
End: 2024-03-06

## 2024-03-06 DIAGNOSIS — I60.9 SAH (SUBARACHNOID HEMORRHAGE) (H): Primary | ICD-10-CM

## 2024-03-06 PROBLEM — Z23 NEED FOR DIPHTHERIA-TETANUS-PERTUSSIS (TDAP) VACCINE: Status: ACTIVE | Noted: 2024-03-06

## 2024-03-06 LAB
ANION GAP SERPL CALCULATED.3IONS-SCNC: 14 MMOL/L (ref 7–15)
BUN SERPL-MCNC: 8 MG/DL (ref 8–23)
CALCIUM SERPL-MCNC: 8.8 MG/DL (ref 8.8–10.2)
CARBAMAZEPINE SERPL-MCNC: 15.6 UG/ML (ref 4–12)
CHLORIDE SERPL-SCNC: 100 MMOL/L (ref 98–107)
CREAT SERPL-MCNC: 0.84 MG/DL (ref 0.67–1.17)
DEPRECATED HCO3 PLAS-SCNC: 24 MMOL/L (ref 22–29)
EGFRCR SERPLBLD CKD-EPI 2021: >90 ML/MIN/1.73M2
ERYTHROCYTE [DISTWIDTH] IN BLOOD BY AUTOMATED COUNT: 12 % (ref 10–15)
GLUCOSE BLDC GLUCOMTR-MCNC: 99 MG/DL (ref 70–99)
GLUCOSE SERPL-MCNC: 102 MG/DL (ref 70–99)
HCT VFR BLD AUTO: 37.1 % (ref 40–53)
HGB BLD-MCNC: 12.9 G/DL (ref 13.3–17.7)
LEVETIRACETAM SERPL-MCNC: 22.6 ΜG/ML (ref 10–40)
MCH RBC QN AUTO: 31.1 PG (ref 26.5–33)
MCHC RBC AUTO-ENTMCNC: 34.8 G/DL (ref 31.5–36.5)
MCV RBC AUTO: 89 FL (ref 78–100)
PLATELET # BLD AUTO: 140 10E3/UL (ref 150–450)
POTASSIUM SERPL-SCNC: 3.3 MMOL/L (ref 3.4–5.3)
POTASSIUM SERPL-SCNC: 3.5 MMOL/L (ref 3.4–5.3)
RBC # BLD AUTO: 4.15 10E6/UL (ref 4.4–5.9)
SODIUM SERPL-SCNC: 138 MMOL/L (ref 135–145)
WBC # BLD AUTO: 5.7 10E3/UL (ref 4–11)

## 2024-03-06 PROCEDURE — 99232 SBSQ HOSP IP/OBS MODERATE 35: CPT | Performed by: INTERNAL MEDICINE

## 2024-03-06 PROCEDURE — 70450 CT HEAD/BRAIN W/O DYE: CPT

## 2024-03-06 PROCEDURE — 97165 OT EVAL LOW COMPLEX 30 MIN: CPT | Mod: GO

## 2024-03-06 PROCEDURE — 85027 COMPLETE CBC AUTOMATED: CPT | Performed by: STUDENT IN AN ORGANIZED HEALTH CARE EDUCATION/TRAINING PROGRAM

## 2024-03-06 PROCEDURE — 95816 EEG AWAKE AND DROWSY: CPT

## 2024-03-06 PROCEDURE — 36415 COLL VENOUS BLD VENIPUNCTURE: CPT | Performed by: STUDENT IN AN ORGANIZED HEALTH CARE EDUCATION/TRAINING PROGRAM

## 2024-03-06 PROCEDURE — 70551 MRI BRAIN STEM W/O DYE: CPT

## 2024-03-06 PROCEDURE — 250N000013 HC RX MED GY IP 250 OP 250 PS 637: Performed by: PSYCHIATRY & NEUROLOGY

## 2024-03-06 PROCEDURE — 120N000001 HC R&B MED SURG/OB

## 2024-03-06 PROCEDURE — 80048 BASIC METABOLIC PNL TOTAL CA: CPT | Performed by: STUDENT IN AN ORGANIZED HEALTH CARE EDUCATION/TRAINING PROGRAM

## 2024-03-06 PROCEDURE — 99418 PROLNG IP/OBS E/M EA 15 MIN: CPT | Performed by: PHYSICIAN ASSISTANT

## 2024-03-06 PROCEDURE — 84132 ASSAY OF SERUM POTASSIUM: CPT | Performed by: STUDENT IN AN ORGANIZED HEALTH CARE EDUCATION/TRAINING PROGRAM

## 2024-03-06 PROCEDURE — 250N000013 HC RX MED GY IP 250 OP 250 PS 637: Performed by: STUDENT IN AN ORGANIZED HEALTH CARE EDUCATION/TRAINING PROGRAM

## 2024-03-06 PROCEDURE — 80156 ASSAY CARBAMAZEPINE TOTAL: CPT | Performed by: STUDENT IN AN ORGANIZED HEALTH CARE EDUCATION/TRAINING PROGRAM

## 2024-03-06 PROCEDURE — 84132 ASSAY OF SERUM POTASSIUM: CPT | Performed by: INTERNAL MEDICINE

## 2024-03-06 PROCEDURE — 36415 COLL VENOUS BLD VENIPUNCTURE: CPT | Performed by: INTERNAL MEDICINE

## 2024-03-06 PROCEDURE — 99233 SBSQ HOSP IP/OBS HIGH 50: CPT | Performed by: PHYSICIAN ASSISTANT

## 2024-03-06 PROCEDURE — 250N000013 HC RX MED GY IP 250 OP 250 PS 637: Performed by: INTERNAL MEDICINE

## 2024-03-06 PROCEDURE — G0378 HOSPITAL OBSERVATION PER HR: HCPCS

## 2024-03-06 PROCEDURE — 82962 GLUCOSE BLOOD TEST: CPT

## 2024-03-06 PROCEDURE — 80177 DRUG SCRN QUAN LEVETIRACETAM: CPT | Performed by: STUDENT IN AN ORGANIZED HEALTH CARE EDUCATION/TRAINING PROGRAM

## 2024-03-06 RX ORDER — CARBAMAZEPINE 400 MG/1
400 TABLET, EXTENDED RELEASE ORAL 2 TIMES DAILY
Status: DISCONTINUED | OUTPATIENT
Start: 2024-03-06 | End: 2024-03-07

## 2024-03-06 RX ORDER — POTASSIUM CHLORIDE 1500 MG/1
40 TABLET, EXTENDED RELEASE ORAL ONCE
Status: COMPLETED | OUTPATIENT
Start: 2024-03-06 | End: 2024-03-06

## 2024-03-06 RX ADMIN — POTASSIUM CHLORIDE 40 MEQ: 1500 TABLET, EXTENDED RELEASE ORAL at 13:47

## 2024-03-06 RX ADMIN — LEVETIRACETAM 1500 MG: 500 TABLET, FILM COATED ORAL at 20:06

## 2024-03-06 RX ADMIN — CARBAMAZEPINE 400 MG: 400 TABLET, EXTENDED RELEASE ORAL at 20:06

## 2024-03-06 RX ADMIN — LEVETIRACETAM 1500 MG: 500 TABLET, FILM COATED ORAL at 13:34

## 2024-03-06 ASSESSMENT — ACTIVITIES OF DAILY LIVING (ADL)
ADLS_ACUITY_SCORE: 29
ADLS_ACUITY_SCORE: 43
ADLS_ACUITY_SCORE: 29
ADLS_ACUITY_SCORE: 45
ADLS_ACUITY_SCORE: 45
ADLS_ACUITY_SCORE: 37
ADLS_ACUITY_SCORE: 45
ADLS_ACUITY_SCORE: 37
ADLS_ACUITY_SCORE: 41
ADLS_ACUITY_SCORE: 45
ADLS_ACUITY_SCORE: 45
DEPENDENT_IADLS:: INDEPENDENT
ADLS_ACUITY_SCORE: 41
ADLS_ACUITY_SCORE: 41
ADLS_ACUITY_SCORE: 45
ADLS_ACUITY_SCORE: 45
ADLS_ACUITY_SCORE: 41
ADLS_ACUITY_SCORE: 45
ADLS_ACUITY_SCORE: 41
ADLS_ACUITY_SCORE: 29
ADLS_ACUITY_SCORE: 29
ADLS_ACUITY_SCORE: 45
ADLS_ACUITY_SCORE: 41
ADLS_ACUITY_SCORE: 41

## 2024-03-06 NOTE — PROGRESS NOTES
RECEIVING UNIT ED HANDOFF REVIEW    ED Nurse Handoff Report was reviewed by: Kristel Alvarez RN on March 5, 2024 at 11:04 PM

## 2024-03-06 NOTE — UTILIZATION REVIEW
"  Admission Status; Secondary Review Determination         Under the authority of the Utilization Management Committee, the utilization review process indicated a secondary review on the above patient.  The review outcome is based on review of the medical records, discussions with staff, and applying clinical experience noted on the date of the review.        (xxx)      Inpatient Status Appropriate - This patient's medical care is consistent with medical management for inpatient care and reasonable inpatient medical practice.      () Observation Status Appropriate - This patient does not meet hospital inpatient criteria and is placed in observation status. If this patient's primary payer is Medicare and was admitted as an inpatient, Condition Code 44 should be used and patient status changed to \"observation\".   () Admission Status NOT Appropriate - This patient's medical care is not consistent with medical management for Inpatient or Observation Status.          RATIONALE FOR DETERMINATION     Leighton Morales is a 67 yo male with a history of epilepsy, right cavernous malformation, KAMRAN, and HLD who was admitted on 3/5/2024 after a fall.  CT head showed mild amount of acute subarachnoid hemorrhage lateral aspect of the left temporal lobe.  Repeat CT stable.  There is concern over worsening confusion and inconsistent medication ingestion.  Neuro consulted and recommend EEG and MRI.   Medication adjustments in process.  He is not medically stable for discharge today and will require at least 1-2 days further hospitalization.  IP status is appropriate.  I spoke with Dr. Sol    The severity of illness, intensity of service provided, expected LOS and risk for adverse outcome make the care complex, high risk and appropriate for hospital admission.        The information on this document is developed by the utilization review team in order for the business office to ensure compliance.  This only denotes the appropriateness of " proper admission status and does not reflect the quality of care rendered.         The definitions of Inpatient Status and Observation Status used in making the determination above are those provided in the CMS Coverage Manual, Chapter 1 and Chapter 6, section 70.4.      Sincerely,     Kinga Diana MD  Physician Advisor   Utilization Review/ Case Management  [facility].

## 2024-03-06 NOTE — TELEPHONE ENCOUNTER
"Spoke to vicki to obtain history. He seems confused, he hit his head with fall and had SAH. He is agitated. He is at [facility]. Vicki states \"he has no health care directive\".     I did not disclose medical data, only obtained information on Mr. Morales.     Component      Latest Ref Rng 2022  8:33 AM 2022  3:10 PM 6/3/2022  12:33 PM 3/13/2023  10:40 AM 2023  3:24 PM   10, 11 Epoxide Level      ug/mL  2.1  2.1  0.8     Carbamazepine Total Level      4.0 - 12.0 ug/mL  4.5  10.7  6.6     Carbamazepine Level        mg/L 7.9        Carbamazepine      4.0 - 12.0 ug/mL     17.2 ()       Legend:  (HH) High Panic    She states that he may make medication errors. \"I looked at his pill box and all the medications were not correct. I am alarmed. There were over 100 bottles of prescription pills on his shelf and they were . The bottles were not empty. I am worried he was not taking medications, however, sometimes he is over taking medications. He is NOT taking his medications consistently\". Vicki states, \"I will help with his medications\". \"He is hallucinating, he is more paranoid\".     Impression:   Carbamazepine levels have been inconsistent.  I double checked on our last visit he was taking carbamazepine 800 mg twice a day which he stated he was.  However with my conversation with Vicki today I am concerned about his ability to take his medications consistently. There is non-adherence to antiepileptic drug.   Medication adherence has been a consistent problem over the last couple of years.  Unfortunately I did order labs to be completed in 2024 visit and he did not complete them.   Currently he is under the care of neurology at [facility].       Plan:   I encouraged pill dispensing machine.   I encouraged family help with medications to avoid mistakes   Check labs once a month to ensure adherence   [facility]   Talk to  about " health directive     Carmen Patton MD
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
## 2024-03-06 NOTE — PLAN OF CARE
Goal Outcome Evaluation:  Plan of Care Reviewed With: patient, child    Overall Patient Progress: improvingOverall Patient Progress: improving    Pt here with mild traumatic L temporal lobe SAH and tiny SDH, related to fall, encephalopathy. Upper lip laceration, DAVID, scabbing, swelling. A&O x2, confused to time and situation. VSS, on RA. LS clear. Not agreeable to cares, apprehensive in taking scheduled meds, with repeated reassurance encouragement pt finally agreed to take AM scheduled Keppra and one time K+ replacement dose, lab redraw scheduled for 1800.  New PIV placed. CMS and Neuro's intact except for confusion, unsteady gait. Up A1 w/ GB. Pt scoring yellow on Aggression Stop Light Tool, restless, confusion. Sitter at bedside. Reg diet, takes pills whole with water. Seizure precaution maintained.  EEG completed, see results. MRI completed, confirmed L SAH and tiny SD hematoma. Daughter at bedside. Discharge pending.

## 2024-03-06 NOTE — PHARMACY-ADMISSION MEDICATION HISTORY
"Pharmacist Admission Medication History    Admission medication history is complete. The information provided in this note is only as accurate as the sources available at the time of the update.    Information Source(s): Patient, Family member, and CareEverywhere/SureScripts via in-person    Pertinent Information: patient took scheduled medications 3/3/24, and took some medications 3/4/24, but family at bedside unsure which ones patient took. Patient states taking a multivitamin \"PRN\", family at bedside state patient was instructed not to take this but patient does have home supply. Did not add to PTA med list at this time.     Changes made to PTA medication list:  Added: None  Deleted: None  Changed: None    Allergies reviewed with patient and updates made in EHR: yes    Medication History Completed By: Esme Nelson [facility] 3/5/2024 8:43 PM    PTA Med List   Medication Sig Last Dose    carBAMazepine (TEGRETOL XR) 400 MG 12 hr tablet Take 2 tablets (800 mg) by mouth 2 times daily 3/3/2024 at unknown time    levETIRAcetam (KEPPRA) 1000 MG tablet Take 1.5 tablets (1,500 mg) by mouth 2 times daily - Oral 3/3/2024 at unknown time    rosuvastatin (CRESTOR) 20 MG tablet Take 1 tablet (20 mg) by mouth daily 3/3/2024 at unknown time    sildenafil (VIAGRA) 100 MG tablet Take 1 tablet (100 mg) by mouth daily as needed (erectile dysfunction) Unknown at PRN, has home supply       "

## 2024-03-06 NOTE — ED NOTES
Paged hospitalist about the critical result-awaits reply.  Informed  as well and he said to call hospitalist.

## 2024-03-06 NOTE — CONSULTS
NEUROSURGERY CONSULTATION NOTE      Neurosurgery was asked to see this patient by Ousmane Jensen DO for evaluation of subarachnoid blood.       CONSULTATION ASSESSMENT AND PLAN:    #1 subarachnoid hemorrhage along the left temporal lobe question of slight thin subdural hemorrhage stable over 2 CTs  2.  Recent falls  3.  Dementia  Repeat imaging is stable.  No surgical intervention currently indicated.  Plan:  - Continue to monitor neuro exam  - HOB > 30  - SBP <160 mmHg  - HOLD all ASA, NSAIDS, anticoagulation      HPI: Received a phone call from the emergency room regarding this patient.  History of epilepsy and right cavernous malformation status post laminectomy in 1995 likely dementia.  Per the record, patient lives alone and is inconsistent in taking his antiepileptic medications.  Patient presented to the emergency room today after a fall at home.  Patient is not on anticoagulation.  Recommending repeat CT which was completed earlier this morning.  Overall stable.  Patient is seen in his hospital room on seventh floor at [facility].  He has a sitter due to impulsivity and he had pulled out his IV line.  Patient denies headache, has not had emesis, is arousable and alert, but confused.    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     Closed fracture of unspecified phalanx or phalanges of hand     Erectile dysfunction, unspecified erectile dysfunction type 12/29/2017    Hyperlipidemia     Nasal bones, closed fracture     KAMRAN on CPAP     Other affections of shoulder region, not elsewhere classified     Other convulsions        Past Surgical History:   Procedure Laterality Date    [facility] [email or web address] NONSPECIFIC PROCEDURE  1960    L Inquinal hernia    [facility] [email or web address] NONSPECIFIC PROCEDURE  0190    Repair Nasal fracture    [facility] [email or web address] NONSPECIFIC PROCEDURE  727101    temporal lobectomy - excision  av malformation    [facility] [email or web address] NONSPECIFIC PROCEDURE      Dental Extraction Age 13       REVIEW OF  SYSTEMS:  As above and with the chart positives and negatives noted    MEDICATIONS:    No current outpatient medications on file.         ALLERGIES/SENSITIVITIES:     No Known Allergies    PERTINENT SOCIAL HISTORY:  Social History     Socioeconomic History    Marital status: Single   Tobacco Use    Smoking status: Former     Packs/day: .5     Types: Cigarettes     Quit date: 1978     Years since quittin.5    Smokeless tobacco: Never   Substance and Sexual Activity    Alcohol use: Yes     Comment:  1-2 beers  per week    Drug use: Never    Sexual activity: Yes     Partners: Female     Social Determinants of Health     Financial Resource Strain: Low Risk  (2023)    Financial Resource Strain     Within the past 12 months, have you or your family members you live with been unable to get utilities (heat, electricity) when it was really needed?: No   Food Insecurity: Low Risk  (2023)    Food Insecurity     Within the past 12 months, did you worry that your food would run out before you got money to buy more?: No     Within the past 12 months, did the food you bought just not last and you didn t have money to get more?: No   Transportation Needs: Low Risk  (2023)    Transportation Needs     Within the past 12 months, has lack of transportation kept you from medical appointments, getting your medicines, non-medical meetings or appointments, work, or from getting things that you need?: No   Interpersonal Safety: Low Risk  (2023)    Interpersonal Safety     Do you feel physically and emotionally safe where you currently live?: Yes     Within the past 12 months, have you been hit, slapped, kicked or otherwise physically hurt by someone?: No     Within the past 12 months, have you been humiliated or emotionally abused in other ways by your partner or ex-partner?: No   Housing Stability: Low Risk  (2023)    Housing Stability     Do you have housing? : Yes     Are you worried about losing your  housing?: No         FAMILY HISTORY:  Family History   Adopted: Yes        PHYSICAL EXAM:   Constitutional: BP (!) 139/96 (BP Location: Right arm)   Pulse 90   Temp 97.2  F (36.2  C) (Oral)   Resp 16   SpO2 96%      Mental Status: Oriented to self only, normal attention span     Cranial Nerves: CN1 to 12 grossly intact     Motor: No pronator drift of upper extremity. Normal bulk and tone all muscle groups of upper and lower extremities.    Strength: 5 out of 5 in all       Sensory: Sensation intact bilaterally to light touch.     Coordination; finger to nose, gait not tested      IMAGING:  I personally reviewed all radiographic images, agree with the findings noted below.   EXAM: CT HEAD W/O CONTRAST  LOCATION: [facility]  DATE: 3/6/2024     INDICATION: Six hour follow up CT for subarachnoid hemorrhage.  COMPARISON: 03/05/2024.  TECHNIQUE: Routine CT Head without IV contrast. Multiplanar reformats. Dose reduction techniques were used.     FINDINGS:  INTRACRANIAL CONTENTS: Stable small volume subarachnoid hemorrhage along the lateral left temporal lobe. Question trace overlying subdural hemorrhage measuring up to 1 mm in thickness. No adverse intracranial mass effect. Prominence of the CSF spaces   along the bilateral cerebral hemispheres are unchanged. No new or progressive intracranial hemorrhage. Chronic encephalomalacia and gliosis in the right temporal lobe deep to an overlying craniotomy. The gray-white differentiation is preserved. Mild   presumed chronic small vessel ischemic changes. Moderate generalized volume loss. No hydrocephalus.      VISUALIZED ORBITS/SINUSES/MASTOIDS: No intraorbital abnormality. No paranasal sinus mucosal disease. No middle ear or mastoid effusion.     BONES/SOFT TISSUES: No acute abnormality. Right-sided craniotomy changes.                                                                      IMPRESSION:  1.  Stable subarachnoid hemorrhage along the left  temporal lobe with question of a thin overlying subdural hemorrhage.  2.  No adverse intracranial mass effec

## 2024-03-06 NOTE — TELEPHONE ENCOUNTER
What is the concern that needs to be addressed by a nurse?   Daughter is calling and wanted staff to know to disregard any calls from patient as he is admit [facility]     May a detailed message be left on voicemail? yes    Date of last office visit: was to be today    Message routed to: nicolasa maki
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
## 2024-03-06 NOTE — PROVIDER NOTIFICATION
Writer paged Dr. Sol. Morning, could we get a diet ordered for patient, don't this there's any reason for him to be NPO. Please advise
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
## 2024-03-06 NOTE — PROVIDER NOTIFICATION
Writer spoke to Dr. Kim, Gen Neuro, inquired if patient needs PRN orders for Ativan for seizure activity. Dr. Kim informed writer, no need for PRN ativan since patient has not had a clinic seizure in a long time.
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
## 2024-03-06 NOTE — PROGRESS NOTES
Neurosurgery Progress     CT HEAD W/O CONTRAST - 3/6/2024    1.  Stable subarachnoid hemorrhage along the left temporal lobe with question of a thin overlying subdural hemorrhage.  2.  No adverse intracranial mass effect    Neuro stable.     TODAY'S PLAN:     -No surgical intervention indicated. We will sign off.   -Our team will facilitate a follow-up in one month approximately with an updated head CT.   -Advance activity as tolerated.  -Continue supportive and symptomatic treatment.  -Pain control measures.  -Advance diet as tolerated.    In the event that patient's symptoms worsen or change we would appreciate being contacted.   _______________________________     Mr. Morales is confused. Rambling on about credit cards. Very argumentative. In no distress however.       BP (!) 142/83 (BP Location: Right arm)   Pulse 81   Temp 98.7  F (37.1  C) (Oral)   Resp 18   SpO2 98%      Pt in bed. Appears comfortable and in no apparent distress, moving all extremities.   CN II-XII intact, alert but not appropriate with conversation.   Witnessed him UMAÑA.  Refusing to participate in physical exam.   Refusing to answer any questions.   All pertinent labs and updated imaging reviewed in EPIC.     Rodrigue Rashid PA-C   Neurosurgery
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
## 2024-03-06 NOTE — ED NOTES
Patient suddenly wake up from his sleep ,confused,sat at the edge of the gurney,pulled out his IV.  Re site his IV -secured and I told him to go back to sleep.
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
## 2024-03-06 NOTE — CONSULTS
Care Management Initial Consult    General Information  Assessment completed with: Nelsy, Genaro Grayson  Type of CM/SW Visit: Initial Assessment    Primary Care Provider verified and updated as needed: Yes   Readmission within the last 30 days: lack of support         Advance Care Planning: Advance Care Planning Reviewed: concerns discussed (daughter has a healthcare directive that he completed but it was not notarized before this hospitalization)        Communication Assessment  Patient's communication style: spoken language (English or Bilingual)        Cognitive  Cognitive/Neuro/Behavioral: .WDL except, orientation  Level of Consciousness: alert, confused  Arousal Level: opens eyes spontaneously  Orientation: disoriented to, time, situation (knows he in the hospital)  Mood/Behavior: calm  Best Language: 0 - No aphasia  Speech: clear    Living Environment:   People in home: alone     Current living Arrangements: house      Able to return to prior arrangements: yes    Family/Social Support:  Care provided by: self  Provides care for: no one  Marital Status: Single  Partner, Children          Description of Support System: Supportive, Involved    Support Assessment: Adequate family and caregiver support    Current Resources:   Patient receiving home care services: No     Community Resources: None  Equipment currently used at home:    Supplies currently used at home: None    Employment/Financial:  Employment Status: retired        Financial Concerns: none   Referral to Financial Worker: No     Does the patient's insurance plan have a 3 day qualifying hospital stay waiver?  Yes   Which insurance plan 3 day waiver is available? Alternative insurance waiver  Will the waiver be used for post-acute placement? Undetermined at this time    Lifestyle & Psychosocial Needs:  Social Determinants of Health     Food Insecurity: Low Risk  (11/6/2023)    Food Insecurity     Within the past 12 months, did you worry that your  food would run out before you got money to buy more?: No     Within the past 12 months, did the food you bought just not last and you didn t have money to get more?: No   Depression: Not at risk (1/29/2024)    PHQ-2     PHQ-2 Score: 0   Housing Stability: Low Risk  (11/6/2023)    Housing Stability     Do you have housing? : Yes     Are you worried about losing your housing?: No   Tobacco Use: Medium Risk (1/29/2024)    Patient History     Smoking Tobacco Use: Former     Smokeless Tobacco Use: Never     Passive Exposure: Not on file   Financial Resource Strain: Low Risk  (11/6/2023)    Financial Resource Strain     Within the past 12 months, have you or your family members you live with been unable to get utilities (heat, electricity) when it was really needed?: No   Alcohol Use: Not on file   Transportation Needs: Low Risk  (11/6/2023)    Transportation Needs     Within the past 12 months, has lack of transportation kept you from medical appointments, getting your medicines, non-medical meetings or appointments, work, or from getting things that you need?: No   Physical Activity: Not on file   Interpersonal Safety: Low Risk  (11/6/2023)    Interpersonal Safety     Do you feel physically and emotionally safe where you currently live?: Yes     Within the past 12 months, have you been hit, slapped, kicked or otherwise physically hurt by someone?: No     Within the past 12 months, have you been humiliated or emotionally abused in other ways by your partner or ex-partner?: No   Stress: Not on file   Social Connections: Not on file     Functional Status:  Prior to admission patient needed assistance:   Dependent ADLs:: Independent  Dependent IADLs:: Independent    Mental Health Status:  Mental Health Status: No Current Concerns       Chemical Dependency Status:  Chemical Dependency Status: No Current Concerns           Values/Beliefs:  Spiritual, Cultural Beliefs, Adventism Practices, Values that affect care: no              Additional Information:  SW consulted for discharge planning and completed chart review. Per ED Provider notes, patient comes with history of seizures and dementia. He presented via EMS for falls. Patient is disoriented to place and situation; SW met with daughter to introduce themselves, confirm information in the chart, and discuss process of discharge planning. Daughter Renuka reports a difficult relationship with the patient and it was not until recently that she gotten involved in his life again due to his recent hospitalization. She confirmed that patient resides at home alone with no services/supports. She thought he was doing well independently but now has found that he is likely not taking meds as prescribed and there are some large bills he has not tended to. He was recently at [facility] and discharged home last weekend with no services. Discussed different levels of care upon discharge (home with HHC, TCU, [facility]). She had questions about his decision making, stating worries that he will want to go home and he will continue to fail there and return to the hospital. Patient had completed a healthcare directive this week but did not get a notary in time for it to be verified before this hospitalization. SW provided a business card for a mobile notary in the event that patient's cognition clears.     ADD: 1600  Met with patient's daughter, son, and partner in the hallway to provide daughter with [facility] directive short form print out and information for [employer].     Taniya Ayala, JOSE, SW   Social Work   [facility]

## 2024-03-07 ENCOUNTER — APPOINTMENT (OUTPATIENT)
Dept: OCCUPATIONAL THERAPY | Facility: CLINIC | Age: 69
DRG: 082 | End: 2024-03-07
Attending: INTERNAL MEDICINE
Payer: COMMERCIAL

## 2024-03-07 ENCOUNTER — APPOINTMENT (OUTPATIENT)
Dept: PHYSICAL THERAPY | Facility: CLINIC | Age: 69
DRG: 082 | End: 2024-03-07
Attending: INTERNAL MEDICINE
Payer: COMMERCIAL

## 2024-03-07 LAB
POTASSIUM SERPL-SCNC: 3.4 MMOL/L (ref 3.4–5.3)
POTASSIUM SERPL-SCNC: 4 MMOL/L (ref 3.4–5.3)

## 2024-03-07 PROCEDURE — 97161 PT EVAL LOW COMPLEX 20 MIN: CPT | Mod: GP

## 2024-03-07 PROCEDURE — 36415 COLL VENOUS BLD VENIPUNCTURE: CPT | Performed by: STUDENT IN AN ORGANIZED HEALTH CARE EDUCATION/TRAINING PROGRAM

## 2024-03-07 PROCEDURE — 36415 COLL VENOUS BLD VENIPUNCTURE: CPT | Performed by: PSYCHIATRY & NEUROLOGY

## 2024-03-07 PROCEDURE — 250N000013 HC RX MED GY IP 250 OP 250 PS 637: Performed by: STUDENT IN AN ORGANIZED HEALTH CARE EDUCATION/TRAINING PROGRAM

## 2024-03-07 PROCEDURE — 99232 SBSQ HOSP IP/OBS MODERATE 35: CPT | Performed by: INTERNAL MEDICINE

## 2024-03-07 PROCEDURE — 250N000013 HC RX MED GY IP 250 OP 250 PS 637: Performed by: PSYCHIATRY & NEUROLOGY

## 2024-03-07 PROCEDURE — 97116 GAIT TRAINING THERAPY: CPT | Mod: GP

## 2024-03-07 PROCEDURE — 97535 SELF CARE MNGMENT TRAINING: CPT | Mod: GO

## 2024-03-07 PROCEDURE — 84132 ASSAY OF SERUM POTASSIUM: CPT | Performed by: STUDENT IN AN ORGANIZED HEALTH CARE EDUCATION/TRAINING PROGRAM

## 2024-03-07 PROCEDURE — 120N000001 HC R&B MED SURG/OB

## 2024-03-07 PROCEDURE — 80156 ASSAY CARBAMAZEPINE TOTAL: CPT | Performed by: PSYCHIATRY & NEUROLOGY

## 2024-03-07 RX ORDER — CARBAMAZEPINE 400 MG/1
400 TABLET, EXTENDED RELEASE ORAL 2 TIMES DAILY
Status: DISCONTINUED | OUTPATIENT
Start: 2024-03-08 | End: 2024-03-08 | Stop reason: HOSPADM

## 2024-03-07 RX ADMIN — ROSUVASTATIN CALCIUM 20 MG: 20 TABLET, FILM COATED ORAL at 08:22

## 2024-03-07 RX ADMIN — LEVETIRACETAM 1500 MG: 500 TABLET, FILM COATED ORAL at 20:20

## 2024-03-07 RX ADMIN — LEVETIRACETAM 1500 MG: 500 TABLET, FILM COATED ORAL at 08:21

## 2024-03-07 RX ADMIN — CARBAMAZEPINE 400 MG: 400 TABLET, EXTENDED RELEASE ORAL at 08:22

## 2024-03-07 RX ADMIN — ACETAMINOPHEN 650 MG: 325 TABLET, FILM COATED ORAL at 11:52

## 2024-03-07 ASSESSMENT — ACTIVITIES OF DAILY LIVING (ADL)
ADLS_ACUITY_SCORE: 25
ADLS_ACUITY_SCORE: 26
ADLS_ACUITY_SCORE: 25
ADLS_ACUITY_SCORE: 25

## 2024-03-07 NOTE — PROGRESS NOTES
"MD Notification    Notified Person: MD    Notified Person Name: DR. Matos     Notification Date/Time: 3/6 10:12pm     Notification Interaction: kuldeep    Purpose of Notification: \"FYI: critical lab result, pt Carbamazepine level was 15.6. please advise. \"    Orders Received: no intervention    "

## 2024-03-07 NOTE — PROGRESS NOTES
Neurology Progress Note      Subjective    Patient feels much better today, says he appreciates this time to get well.  No real complaints (except was disappointed when I told him it seems highly unlikely he will be leaving the hospital today).      No nystagmus, though appears mildly unsteady with walking.        Diagnostic Work-up Review:      MRI Brain without Contrast  IMPRESSION:     1. Left temporal subarachnoid hemorrhage, similar to prior CT given  differences in technique.  2. Tiny left frontotemporal convexity subdural hematoma.    EEG  Conclusion:     Abnormal EEG. There is generalized theta slowing over both hemispheres. This can be seen in mild encephalopathy vs neurodegenrative state. Clinical correlation is recommended.           Impression:  68 year old male admitted with confusion.  He has had a number of 'episodes' of similar symptoms per daughter (such as noted in admission from last November).  In the past, these episodes seemed to be a result of medication toxicity.  It is only recently that it was realized how poorly he has been managing his medications at home.  And in this case as well, he presented with a very elevated carbamazepine level in the ER.    Because daughter had noted some differences in severity with this case, an MRI and EEG were ordered.  The areas of subarachnoid and subdural hemorrhage are likely the result of a fall.  This is probably not playing a very large role, because he presented to another ER with similar confusion and no hemorrhage on CT head at that time.  The EEG and MRI were unrevealing otherwise.    I have restarted his carbamazepine at half his home regimen (though of note, he had been on this dose previously about a year ago and seemed to remain seizure free on this dose).  Unfortunately, there is a bit of lag time for the results of cbz levels to return.  It had not decreased much, perhaps being the ER formulation.  Will continue checking levels, I would  That was ordered through GI.  Did she contact that office?   assume this will normalize on his new dose.  Clinically, he has some unsteadiness but no significant nystagmus.      The primary issue at this point is appropriate placement because this issue has happened before and I assume will keep happening without more resources for monitoring drug administration (and for whatever other assistance is deemed appropriate for iADLs).  I was happy to hear him say that he realizes he should no longer be driving, I agree with this.          Recommendations:  - For SAH, neurosurgery states follow up in their clinic in 1 month  - Will order carbamazepine level for this evening, awaiting its normalization - I am holding the dose this evening  - no driving  - Primary issue is finding appropriate placement, appreciate PT/ OT / SW involvement    He will need follow up with his outpatient neurologist    If levels normalize, will plan on signing off - Dr. Newman will assume the service tomorrow if there are any issues    I spent 39 minutes on the date of encounter with the patient and before and after the visit on activities detailed in the above note which may include reviewing the EMR, documenting clinical information, and communicating with other health care professionals.    Carlos Batista MD  [facility] [phone]

## 2024-03-07 NOTE — PROGRESS NOTES
Care Management Follow Up    Length of Stay (days): 1    Expected Discharge Date: 03/08/2024     Concerns to be Addressed: discharge planning       Patient plan of care discussed at interdisciplinary rounds: Yes    Anticipated Discharge Disposition:  TCU    Patient/family educated on Medicare website which has current facility and service quality ratings:  yes  Education Provided on the Discharge Plan:  yes  Patient/Family in Agreement with the Plan:  daughter in agreement     Referrals Placed by CM/SW:  none at this time   Private pay costs discussed: Not applicable    Additional Information:  MIKI called daughter Renuka to discuss recommendations for TCU. Discussed that the patient had the sitter and that there may be barriers to getting a bed at preferred locations for TCU. Suggested that she find 5-10 facilities that she would be agreeable to. She will be at the hospital today at 1100, SW will go over TCU list with her.      ADD: 1200  MIKI met with patient, his son and daughter, and son-in-law in the room to discuss that the current recommendation is for TCU. Discussed that patient has been progressing each day and recommendations do change. Spoke about medicare coverage. They have TCU lists and are interested in [facility], [facility], [facility], and [facility] and they will select several other preferences. Referrals will be made tomorrow morning. Patient is agreeable to the plan.     Family selected a few more options: [facility] and [facility].    JOSE Colvin, [facility]   Social Work   [facility]

## 2024-03-07 NOTE — PLAN OF CARE
Pt here with SAH, s/p fall with fx nasal nose. A&Ox2, disoriented to time and situation. Neuros are stable, generalized weakness, follow commands. VSS. Up with A1/GB. Denies pain. Sitter at bedside. Pt scoring yellow on the Aggression Stop Light Tool. Discharge pending.

## 2024-03-07 NOTE — PROGRESS NOTES
03/07/24 0900   Appointment Info   Signing Clinician's Name / Credentials (PT) Julia Weiler, SPT   Student Supervision Direct supervision provided;Direct Patient Contact Provided  (Cassidy Jha, PT, DPT)   Living Environment   People in Home alone   Current Living Arrangements house   Home Accessibility stairs to enter home;stairs within home   Number of Stairs, Main Entrance 4   Stair Railings, Main Entrance railing on right side (ascending)   Number of Stairs, Within Home, Primary greater than 10 stairs   Stair Railings, Within Home, Primary railings on both sides of stairs   Transportation Anticipated family or friend will provide   Living Environment Comments Lives in a home at baseline, 12 steps down to the finished basement which he uses. Does not recieve assistance with any cares.   Self-Care   Usual Activity Tolerance good   Current Activity Tolerance moderate   Regular Exercise Yes   Activity/Exercise Type walking   Fall history within last six months yes   Number of times patient has fallen within last six months 1   Activity/Exercise/Self-Care Comment At least 1 fall known, lead to this hospitilization   General Information   Onset of Illness/Injury or Date of Surgery 03/05/24   Referring Physician Kavya Sol MD   Patient/Family Therapy Goals Statement (PT) Patient considering downsizing living space or having help, family interested in these options as well.   Pertinent History of Current Problem (include personal factors and/or comorbidities that impact the POC) Patient is a 68 year old male admitted to the hospital on 3/5/24 from a fall that lead to a small subarachnoid hemmorhage. PMH includes epilepsy, KAMRAN on CPAP, hyperlipidemia.   General Observations Patient overall agreeable and pleasant in todays session, somewhat disoriented to place/date, son in room at end of session.   Cognition   Affect/Mental Status (Cognition) confused   Orientation Status (Cognition) oriented to;other (see  comments)  (Oriented to place and month but not year)   Follows Commands (Cognition) follows two-step commands;repetition of directions required   Safety Deficit (Cognition) moderate deficit;insight into deficits/self-awareness;awareness of need for assistance   Cognitive Status Comments Patient instructed in use of call light if wanting to get up or needing help. Pt reports understanding- however within 1 minute of PT leaving room pts chair alarm going off.   Pain Assessment   Patient Currently in Pain Yes, see Vital Sign flowsheet   Integumentary/Edema   Integumentary/Edema Comments Cuts and bruising on lips from fall   Posture    Posture Forward head position;Protracted shoulders   Range of Motion (ROM)   Range of Motion ROM is WFL   Strength (Manual Muscle Testing)   Strength (Manual Muscle Testing) Deficits observed during functional mobility   Strength Comments Patient has gross 4-5/5 strength LE weakness impacting stability of some functional movements.   Bed Mobility   Bed Mobility other (see comments)  (Unable to assess in session)   Transfers   Transfers sit-stand transfer   Transfer Safety Concerns Noted decreased sequencing ability;decreased balance during turns   Impairments Contributing to Impaired Transfers impaired balance;impaired postural control;decreased strength   Sit-Stand Transfer   Sit-Stand [place] (Transfers) verbal cues;supervision   Assistive Device (Sit-Stand Transfers) walker, front-wheeled   Gait/Stairs (Locomotion)   [place] Level (Gait) contact guard   Assistive Device (Gait) walker, front-wheeled   Distance in Feet (Gait) 15' for eval   Pattern (Gait) step-through   Deviations/Abnormal Patterns (Gait) base of support, narrow;stride length decreased   Balance   Balance other (describe)   Balance Comments Impaired dynamic balance. FGA completed, scored 17/30 demonstrating increased risk for falls. See seperate note for details.   Sensory Examination   Sensory Perception  patient reports no sensory changes   Coordination   Coordination other (see comments)   Coordination Comments Noted some difficulty with bilateral hand coordination when taking stopper out of coffee cup.   Clinical Impression   Criteria for Skilled Therapeutic Intervention Yes, treatment indicated   PT Diagnosis (PT) Impaired mobility   Influenced by the following impairments impaired strength, impaired balance   Functional limitations due to impairments functional transfers, gait   Clinical Presentation (PT Evaluation Complexity) evolving   Clinical Presentation Rationale varied orientation/cognition, unsure of discharge location   Clinical Decision Making (Complexity) low complexity   Planned Therapy Interventions (PT) balance training;bed mobility training;gait training;home exercise program;motor coordination training;neuromuscular re-education;patient/family education;postural re-education;stair training;strengthening;transfer training;progressive activity/exercise   Risk & Benefits of therapy have been explained evaluation/treatment results reviewed;care plan/treatment goals reviewed;risks/benefits reviewed;current/potential barriers reviewed;participants voiced agreement with care plan;participants included;patient;son   PT Total Evaluation Time   PT Eval, Low Complexity Minutes (46499) 15   Physical Therapy Goals   PT Frequency 5x/week   PT Predicted Duration/Target Date for Goal Attainment 03/12/24   PT Goals Bed Mobility;Transfers;Gait;Stairs   PT: Bed Mobility Independent;Supine to/from sit;Rolling;Bridging   PT: Transfers Modified independent;Assistive device;Sit to/from stand;Bed to/from chair   PT: Gait Modified independent;Rolling walker;Assistive device;100 feet   PT: Stairs Independent;Greater than 10 stairs;Rail on both sides   Interventions   Interventions Quick Adds Gait Training   Gait Training   Gait Training Minutes (64046) 10   Symptoms Noted During/After Treatment (Gait Training) none    Treatment Detail/Skilled Intervention Patient ambulated 60' CGA with FWW, and 110' x 2 CGA with no AD. Patient requires verbal cueing to keep walker close and not push down so hard on walker when using it, patient able to make change and maintain. Pt does not use AD at baseline. When walking without AD patient has decreased gait speed, step length, and demonstrates more narrow FAVIOLA. Encouraged pt to use FWW for most mobility at this time while recovering strength and balance. Patient also navigated 4 stairs with B railings, CGA, and 4 stairs with 1 railing, CGA. Patient has alternating step pattern on stairs and has no LOBs. At end of session pt up in chair with call alarm in reach, chair alarm on, and son visiting.   PT Discharge Planning   PT Plan Dynamic balance challenges, progress tolerance for stairs (12 with B railings, and 4 with 1 rail).   PT Discharge Recommendation (DC Rec) Transitional Care Facility   PT Rationale for DC Rec Patient currently below baseline mobility. At baseline pt lives in a home alone with no assistance or ADs. Currently pt requiring use of AD due to increased risk of falls. Pt CGA for transfers and gait with FWW. Able to navigate 4 stairs with 1 rail. Limitted by balance impairements primarily and at an increased risk of falling if he were to return home alone at this time.   PT Brief overview of current status Ax1 with FWW for gait and transfers   PT Equipment Needed at Discharge walker, rolling   Total Session Time   Timed Code Treatment Minutes 10   Total Session Time (sum of timed and untimed services) 25

## 2024-03-07 NOTE — PROGRESS NOTES
03/07/24 0925   Signing Clinician's Name / Credentials   Signing clinician's name / credentials Julia Weiler, SPT   Functional Gait Assessment (RICCARDO Roche, NICHELLE Coto, et al. (2004))   1. GAIT LEVEL SURFACE 2   2. CHANGE IN GAIT SPEED 3   3. GAIT WITH HORIZONTAL HEAD TURNS 3   4. GAIT WITH VERTICAL HEAD TURNS 3   5. GAIT AND PIVOT TURN 1   6. STEP OVER OBSTACLE 1   7. GAIT WITH NARROW BASE OF SUPPORT 0   8. GAIT WITH EYES CLOSED 1   9. AMBULATING BACKWARDS 1   10. STEPS 2   Total Functional Gait Assessment Score   TOTAL SCORE: (MAXIMUM SCORE 30) 17       Functional Gait Assessment (FGA): The FGA assesses postural stability during various walking tasks.     Gait assistive device used: None     Scores of <22 /30 have been correlated with predicting falls in community-dwelling older adults according to Melchor & Bhavik 2010.   Scores of <18 /30 have been correlated with increased risk for falls in patients with Parkinsons Disease according to Javi Ivy, Jacobsen et al 2014.  Minimal Detectable Change for patients with acute/chronic stroke = 4.2 according to Thidesiree & Erasmoschel 2009  Minimal Detectable Change for patients with vestibular disorder = 8 according to Melchor & Bhavik 2010     Assessment (rationale for performing, application to patient s function & care plan): Performed to assess balance with gait. Scored at 17/30 . Pt will benefit from continued skilled PT Intervention to progress balance and reduce risk for falls.  (Minutes billed as physical performance test): 0 (during initial Eval)

## 2024-03-07 NOTE — PLAN OF CARE
Reason for Admission: SAH, small SDH, and seizure    Cognitive/Mentation: A/Ox 2  Neuros/CMS: Intact ex. Confusion   VS: stable.  GI: Continent.  : Continent.  Pulmonary: LS clear.  Pain: denies.     Drains/Lines:   Skin: bruising on mouth  Activity: Assist x one .  Diet: reg with thin liquids. Takes pills whole.     Therapies recs: pending   Discharge: pending    Aggression Stoplight Tool: yellow, confusion
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
## 2024-03-08 ENCOUNTER — APPOINTMENT (OUTPATIENT)
Dept: PHYSICAL THERAPY | Facility: CLINIC | Age: 69
DRG: 082 | End: 2024-03-08
Attending: INTERNAL MEDICINE
Payer: COMMERCIAL

## 2024-03-08 VITALS
OXYGEN SATURATION: 99 % | DIASTOLIC BLOOD PRESSURE: 67 MMHG | HEIGHT: 71 IN | WEIGHT: 180 LBS | BODY MASS INDEX: 25.2 KG/M2 | TEMPERATURE: 97.6 F | SYSTOLIC BLOOD PRESSURE: 112 MMHG | RESPIRATION RATE: 17 BRPM | HEART RATE: 69 BPM

## 2024-03-08 LAB
CARBAMAZEPINE SERPL-MCNC: 4.8 UG/ML (ref 4–12)
CARBAMAZEPINE SERPL-MCNC: 6.7 UG/ML (ref 4–12)
CARBAMAZEPINE SERPL-MCNC: 7.5 UG/ML (ref 4–12)

## 2024-03-08 PROCEDURE — 99239 HOSP IP/OBS DSCHRG MGMT >30: CPT | Performed by: INTERNAL MEDICINE

## 2024-03-08 PROCEDURE — 97116 GAIT TRAINING THERAPY: CPT | Mod: GP

## 2024-03-08 PROCEDURE — 80156 ASSAY CARBAMAZEPINE TOTAL: CPT | Performed by: PSYCHIATRY & NEUROLOGY

## 2024-03-08 PROCEDURE — 250N000013 HC RX MED GY IP 250 OP 250 PS 637: Performed by: PSYCHIATRY & NEUROLOGY

## 2024-03-08 PROCEDURE — 250N000013 HC RX MED GY IP 250 OP 250 PS 637: Performed by: STUDENT IN AN ORGANIZED HEALTH CARE EDUCATION/TRAINING PROGRAM

## 2024-03-08 PROCEDURE — 97112 NEUROMUSCULAR REEDUCATION: CPT | Mod: GP

## 2024-03-08 PROCEDURE — 36415 COLL VENOUS BLD VENIPUNCTURE: CPT | Performed by: PSYCHIATRY & NEUROLOGY

## 2024-03-08 RX ORDER — CARBAMAZEPINE 400 MG/1
400 TABLET, EXTENDED RELEASE ORAL 2 TIMES DAILY
DISCHARGE
Start: 2024-03-08 | End: 2024-08-26

## 2024-03-08 RX ADMIN — CARBAMAZEPINE 400 MG: 400 TABLET, EXTENDED RELEASE ORAL at 08:31

## 2024-03-08 RX ADMIN — ROSUVASTATIN CALCIUM 20 MG: 20 TABLET, FILM COATED ORAL at 08:31

## 2024-03-08 RX ADMIN — LEVETIRACETAM 1500 MG: 500 TABLET, FILM COATED ORAL at 08:30

## 2024-03-08 ASSESSMENT — ACTIVITIES OF DAILY LIVING (ADL)
ADLS_ACUITY_SCORE: 25

## 2024-03-08 NOTE — PLAN OF CARE
Patient discharged. Discontinued IV. Discharge paperwork reviewed, patient indicates understanding of all follow up instructions and appointments. All questions answered. Patient indicates understanding of medication schedule. Patient states they have all belongings. Pt family here to drive him to TCU.

## 2024-03-08 NOTE — PLAN OF CARE
PMH: Epilepsy, KAMRAN on CPAP, HLD.     Admit: 3/5 after a fall at home, found to have SAH. In same encounter, also found to have carbamazepine toxicity.     Pain/comfort: Denies pain.     Assessment: Alert & oriented x3-4, disoriented to time. Vital signs stable on room air. Neuro status assessed & monitored, WDL except: generalized weakness. Continent of bowel & bladder. Anterior nose & upper lip laceration -- scabbed with surrounding ecchymosis/edema. Plan: discharge to TCU.     Assist of 1 GB and RW. Reg/thin diet, meds whole with water. L PIV - SL.    Vitals/neuro assessment per unit protocol. Medications administered as ordered. Education regarding plan of care.     No Meyer RN on 3/7/2024 at 9:37 PM

## 2024-03-08 NOTE — PROGRESS NOTES
Care Management Discharge Note    Discharge Date: 03/08/2024       Discharge Disposition:  [facility] TCU  Discharge Services:  therapies  Discharge Transportation: family    Private pay costs discussed: private room/amenity fees and transportation costs    Does the patient's insurance plan have a 3 day qualifying hospital stay waiver?  Yes   Which insurance plan 3 day waiver is available? Alternative insurance waiver  Will the waiver be used for post-acute placement? Yes    PAS Confirmation Code: [id number]  Patient/family educated on Medicare website which has current facility and service quality ratings:  yes    Education Provided on the Discharge Plan:  yes  Persons Notified of Discharge Plans: daughter, facility, bedside RN  Patient/Family in Agreement with the Plan:  yes    Handoff Referral Completed: No    Additional Information:  SW met with hospitalist and confirmed patient is medically ready to discharge today. Received a call from Candida at [facility] that they can offer a double room today. Called daughter and confirmed this plan, to a double room, anytime after 1100. She is on her way to the hospital and asks that she and her brother let the patient know what the plan is for today.     Received a call back from daughter saying she can't come up to the hospital today but her brother is here and said he will transport. Patient and son updated by doctor about discharge plan for today. Patient's son will transport patient to [facility] TCU today at 1200. Confirmed discharge plan with Candida at [facility]. Patient will need to bring his own CPAP machine, communicated this to daughter and she will work on having someone get it from his house to drop off tonight before bed. Orders sent.     PAS-RR    D: Per DHS regulation, MIKI completed and submitted PAS-RR to [place] [employer] Direct Connect via the Senior LinkAge Line.  PAS-RR confirmation # is : [id number]    P: Further questions may be directed to Senior LinkAge Line at  #[phone], option #4 for [facility]- staff.      JOSE Colvin, [facility]   Social Work   [facility]

## 2024-03-08 NOTE — PLAN OF CARE
Physical Therapy Discharge Summary    Reason for therapy discharge:    Discharged to transitional care facility.    Progress towards therapy goal(s). See goals on Care Plan in [facility] electronic health record for goal details.  Goals partially met.  Barriers to achieving goals:   discharge from facility.    Therapy recommendation(s):    Continued therapy is recommended.  Rationale/Recommendations:  PT at TCU to progress balance and safety with mobility prior to returning home alone.

## 2024-03-08 NOTE — PLAN OF CARE
Occupational Therapy Discharge Summary    Reason for therapy discharge:    Discharged to transitional care facility.    Progress towards therapy goal(s). See goals on Care Plan in [facility] electronic health record for goal details.  Goals partially met.  Barriers to achieving goals:   discharge from facility.    Therapy recommendation(s):      Continued therapy is recommended.  Rationale/Recommendations:   .OT Rationale for DC Rec: Pt below baseline. Pt's daughter reports and per chart review, has had decline in cognitive function over past two years. Pt living alone, medication non-compliance, multiple recent hospitalizations. Anticipate that pt needs more supportive living environment post d/c.    Writing therapist did not treat pt, note written based on previous treating therapist notes and recommendations. Please see chart and flow sheet for additional details.

## 2024-03-08 NOTE — PLAN OF CARE
Pt here with SAH, s/p fall with fx nasal nose. A&Ox2, disoriented to time and situation. Neuros are stable, generalized weakness, follow commands. VSS. Up with A1/SB, refuses belt. Continent of B&B. Denies pain. Pt scoring yellow on the Aggression Stop Light Tool. Discharge pending.

## 2024-03-11 ENCOUNTER — DOCUMENTATION ONLY (OUTPATIENT)
Dept: GERIATRICS | Facility: CLINIC | Age: 69
End: 2024-03-11
Payer: COMMERCIAL

## 2024-03-11 ENCOUNTER — PATIENT OUTREACH (OUTPATIENT)
Dept: CARE COORDINATION | Facility: CLINIC | Age: 69
End: 2024-03-11

## 2024-03-11 ENCOUNTER — TRANSITIONAL CARE UNIT VISIT (OUTPATIENT)
Dept: GERIATRICS | Facility: CLINIC | Age: 69
End: 2024-03-11
Payer: COMMERCIAL

## 2024-03-11 VITALS
TEMPERATURE: 98 F | HEART RATE: 76 BPM | OXYGEN SATURATION: 97 % | DIASTOLIC BLOOD PRESSURE: 71 MMHG | BODY MASS INDEX: 25.06 KG/M2 | RESPIRATION RATE: 18 BRPM | HEIGHT: 71 IN | SYSTOLIC BLOOD PRESSURE: 109 MMHG | WEIGHT: 179 LBS

## 2024-03-11 DIAGNOSIS — G40.909 SEIZURE DISORDER (H): ICD-10-CM

## 2024-03-11 DIAGNOSIS — G47.33 OSA ON CPAP: ICD-10-CM

## 2024-03-11 DIAGNOSIS — R41.89 COGNITIVE IMPAIRMENT: ICD-10-CM

## 2024-03-11 DIAGNOSIS — G93.40 ENCEPHALOPATHY: ICD-10-CM

## 2024-03-11 DIAGNOSIS — R53.81 PHYSICAL DECONDITIONING: ICD-10-CM

## 2024-03-11 DIAGNOSIS — I60.9 SUBARACHNOID HEMORRHAGE (H): Primary | ICD-10-CM

## 2024-03-11 PROCEDURE — 99310 SBSQ NF CARE HIGH MDM 45: CPT | Performed by: NURSE PRACTITIONER

## 2024-03-11 NOTE — PROGRESS NOTES
[facility] GERIATRICS    PRIMARY CARE PROVIDER AND CLINIC:  Los Mcelroy MD, [address] [place] [address] / [facility] [address]  Chief Complaint   Patient presents with    Hospital F/U      [place] Medical Record Number:  [id number]  Place of Service where encounter took place:  [facility] ([facility]) [25]    Leighton Morales  is a 68 year old  (1955), admitted to the above facility from  [facility]. Hospital stay 3/5/24 through 3/8/24..   HPI:    PMH epilepsy, KAMRAN on CPAP , HLD who presented after a fall.   Traumatic left temporal lobe subarachnoid hemorrhage  Tiny avulsion fracture anterior spine maxilla  Fall at home  Non surgical intervention, f/u one month head CT  Non intractable epilepsy  Carbamazepine toxicity  Found to have supratherapeutic carbamazepine levels, medication dose decreased  F/u with general neurology as OP   Acute toxic and metabolic encephalopathy  Has had multiple admissions due to encephalopathy, felt to be from medication toxicity and cognitive impairment, patient had called 911 stating there was an intruder in the house, when police arrived he fell, landing on his face, no evidence of intrusion found.   On exam today: patient walking around in room independently without a device, denies pain, H/A, dizziness, CP, palpitations, SOB, N/V/D or constipation, states he slept fair last night, no other concerns noted.     CODE STATUS/ADVANCE DIRECTIVES DISCUSSION:  Prior  CPR/Full code   ALLERGIES: No Known Allergies   PAST MEDICAL HISTORY:   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     Closed fracture of unspecified phalanx or phalanges of hand     Erectile dysfunction, unspecified erectile dysfunction type 12/29/2017    Hyperlipidemia     Nasal bones, closed fracture     KAMRAN on CPAP     Other affections of shoulder region, not elsewhere classified     Other convulsions       PAST SURGICAL HISTORY:   has a past  "surgical history that includes NONSPECIFIC PROCEDURE (1960); NONSPECIFIC PROCEDURE (0190); NONSPECIFIC PROCEDURE (621484); and NONSPECIFIC PROCEDURE.  FAMILY HISTORY: family history is not on file. He was adopted.  SOCIAL HISTORY:   reports that he quit smoking about 45 years ago. His smoking use included cigarettes. He smoked an average of 0.5 packs per day. He has never used smokeless tobacco. He reports current alcohol use. He reports that he does not use drugs.  Patient's living condition: lives alone    Post Discharge Medication Reconciliation Status:   MED REC REQUIRED  Post Medication Reconciliation Status: discharge medications reconciled and changed, per note/orders       Current Outpatient Medications   Medication Sig    carBAMazepine (TEGRETOL XR) 400 MG 12 hr tablet Take 1 tablet (400 mg) by mouth 2 times daily    levETIRAcetam (KEPPRA) 1000 MG tablet Take 1.5 tablets (1,500 mg) by mouth 2 times daily - Oral    rosuvastatin (CRESTOR) 20 MG tablet Take 1 tablet (20 mg) by mouth daily    sildenafil (VIAGRA) 100 MG tablet Take 1 tablet (100 mg) by mouth daily as needed (erectile dysfunction)     No current facility-administered medications for this visit.       ROS:  10 point ROS of systems including Constitutional, Eyes, Respiratory, Cardiovascular, Gastroenterology, Genitourinary, Integumentary, Musculoskeletal, Psychiatric were all negative except for pertinent positives noted in my HPI.    Vitals:  /71   Pulse 76   Temp 98  F (36.7  C)   Resp 18   Ht 1.803 m (5' 11\")   Wt 81.2 kg (179 lb)   SpO2 97%   BMI 24.97 kg/m    Exam:  GENERAL APPEARANCE:  Alert, in no distress  ENT:  Mouth and posterior oropharynx normal, moist mucous membranes, normal hearing acuity  EYES:  EOM, conjunctivae, lids, pupils and irises normal, PERRL  RESP:  respiratory effort and palpation of chest normal, lungs clear to auscultation , no respiratory distress  CV:  Palpation and auscultation of heart done , regular " rate and rhythm, no murmur, rub, or gallop, no edema  ABDOMEN:  normal bowel sounds, soft, nontender, no hepatosplenomegaly or other masses  M/S:   patient sitting up on edge of bed  SKIN:  Inspection of skin and subcutaneous tissue baseline, abrasion to top lip, no signs of infection  NEURO:   speech wnl  PSYCH:  affect and mood normal    Lab/Diagnostic data:  Recent labs in [facility] reviewed by me today.  and Most Recent 3 CBC's:  Recent Labs   Lab Test 03/06/24  0639 03/05/24 1857 11/30/23  1040   WBC 5.7 6.1 4.4   HGB 12.9* 13.8 14.9   MCV 89 90 91   * 161 135*     Most Recent 3 BMP's:  Recent Labs   Lab Test 03/07/24  1202 03/06/24  1908 03/06/24 0639 03/06/24  0034 03/05/24 1857 11/30/23  1040   NA  --   --  138  --  139 139   POTASSIUM 4.0 3.5 3.4  3.3*  --  3.4 4.1   CHLORIDE  --   --  100  --  101 101   CO2  --   --  24  --  25 26   BUN  --   --  8.0  --  10.6 13.1   CR  --   --  0.84  --  1.06 0.99   ANIONGAP  --   --  14  --  13 12   DEREK  --   --  8.8  --  9.0 8.9   GLC  --   --  102* 99 96 95  95       ASSESSMENT/PLAN:    (I60.9) Subarachnoid hemorrhage (H)  (primary encounter diagnosis)  (R53.81) Physical deconditioning  Comment: acute/ongoing  After a fall at home  Plan: PT and OT, non surgical,     (G93.40) Encephalopathy  (R41.89) Cognitive impairment  Comment: acute/ongoing  Plan: PT and OT,  to assist with discharge planning    (G47.33) KAMRAN on CPAP  Comment: ongoing  Plan: monitor SaO2 at rest and with activity, Cpap with home settings    (G40.909) Seizure disorder (H)  Comment: ongoing  Found to have carbamazepine toxicity, dose decreased   Plan: continue carbamazepine 400mg BID, levetiracetam 1500mg BID      Orders:  CBC, BMP and carbamazepine level on Monday      Total time spent with patient visit at the skilled nursing facility was 45 min including patient visit and review of past records. Reviewed neurology, internal medicine progress notes , lab and imaging results,  discussed POC and medications at bedside with patient    Electronically signed by:  Tonya Lynn Haase, APRN CNP

## 2024-03-11 NOTE — LETTER
[facility]   To:   [facility]                Please give to facility    From:Sarita Ponce/ ESTHER Care Coordinator   [facility]   P: [phone]  [email or web address]    Patient Name:  Leighton Morales YOB: 1955   Admit date: 3/5/24      *Information Needed:  Please contact me when the patient will discharge (or if they will move to long term care)- include the discharge date, disposition, and main diagnosis   If the patient is discharged with home care services, please provide the name of the agency    Also- Please inform me if a care conference is being held.   Phone, Fax or Email with information                              Thank you, Sarita Ponce

## 2024-03-11 NOTE — LETTER
3/11/2024        RE: Leighton Morales  [address] [address] [facility] [address]        [facility] GERIATRICS    PRIMARY CARE PROVIDER AND CLINIC:  Los Mcelroy MD, [address]  / [facility] [address]  Chief Complaint   Patient presents with     Hospital F/U      [place] Medical Record Number:  [id number]  Place of Service where encounter took place:  [facility] ([facility]) [25]    Leighton Morales  is a 68 year old  (1955), admitted to the above facility from  [facility]. Hospital stay 3/5/24 through 3/8/24..   HPI:    PMH epilepsy, KAMRAN on CPAP , HLD who presented after a fall.   Traumatic left temporal lobe subarachnoid hemorrhage  Tiny avulsion fracture anterior spine maxilla  Fall at home  Non surgical intervention, f/u one month head CT  Non intractable epilepsy  Carbamazepine toxicity  Found to have supratherapeutic carbamazepine levels, medication dose decreased  F/u with general neurology as OP   Acute toxic and metabolic encephalopathy  Has had multiple admissions due to encephalopathy, felt to be from medication toxicity and cognitive impairment, patient had called 911 stating there was an intruder in the house, when police arrived he fell, landing on his face, no evidence of intrusion found.   On exam today: patient walking around in room independently without a device, denies pain, H/A, dizziness, CP, palpitations, SOB, N/V/D or constipation, states he slept fair last night, no other concerns noted.     CODE STATUS/ADVANCE DIRECTIVES DISCUSSION:  Prior  CPR/Full code   ALLERGIES: No Known Allergies   PAST MEDICAL HISTORY:   Past Medical History:   Diagnosis Date     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      Closed fracture of unspecified phalanx or phalanges of hand      Erectile dysfunction, unspecified erectile dysfunction type 12/29/2017     Hyperlipidemia      Nasal bones, closed fracture      KAMRAN on CPAP      Other affections of shoulder region,  "not elsewhere classified      Other convulsions       PAST SURGICAL HISTORY:   has a past surgical history that includes NONSPECIFIC PROCEDURE (1960); NONSPECIFIC PROCEDURE (0190); NONSPECIFIC PROCEDURE (111095); and NONSPECIFIC PROCEDURE.  FAMILY HISTORY: family history is not on file. He was adopted.  SOCIAL HISTORY:   reports that he quit smoking about 45 years ago. His smoking use included cigarettes. He smoked an average of 0.5 packs per day. He has never used smokeless tobacco. He reports current alcohol use. He reports that he does not use drugs.  Patient's living condition: lives alone    Post Discharge Medication Reconciliation Status:   MED REC REQUIRED  Post Medication Reconciliation Status: discharge medications reconciled and changed, per note/orders       Current Outpatient Medications   Medication Sig     carBAMazepine (TEGRETOL XR) 400 MG 12 hr tablet Take 1 tablet (400 mg) by mouth 2 times daily     levETIRAcetam (KEPPRA) 1000 MG tablet Take 1.5 tablets (1,500 mg) by mouth 2 times daily - Oral     rosuvastatin (CRESTOR) 20 MG tablet Take 1 tablet (20 mg) by mouth daily     sildenafil (VIAGRA) 100 MG tablet Take 1 tablet (100 mg) by mouth daily as needed (erectile dysfunction)     No current facility-administered medications for this visit.       ROS:  10 point ROS of systems including Constitutional, Eyes, Respiratory, Cardiovascular, Gastroenterology, Genitourinary, Integumentary, Musculoskeletal, Psychiatric were all negative except for pertinent positives noted in my HPI.    Vitals:  /71   Pulse 76   Temp 98  F (36.7  C)   Resp 18   Ht 1.803 m (5' 11\")   Wt 81.2 kg (179 lb)   SpO2 97%   BMI 24.97 kg/m    Exam:  GENERAL APPEARANCE:  Alert, in no distress  ENT:  Mouth and posterior oropharynx normal, moist mucous membranes, normal hearing acuity  EYES:  EOM, conjunctivae, lids, pupils and irises normal, PERRL  RESP:  respiratory effort and palpation of chest normal, lungs clear to " auscultation , no respiratory distress  CV:  Palpation and auscultation of heart done , regular rate and rhythm, no murmur, rub, or gallop, no edema  ABDOMEN:  normal bowel sounds, soft, nontender, no hepatosplenomegaly or other masses  M/S:   patient sitting up on edge of bed  SKIN:  Inspection of skin and subcutaneous tissue baseline, abrasion to top lip, no signs of infection  NEURO:   speech wnl  PSYCH:  affect and mood normal    Lab/Diagnostic data:  Recent labs in [facility] reviewed by me today.  and Most Recent 3 CBC's:  Recent Labs   Lab Test 03/06/24  0639 03/05/24 1857 11/30/23  1040   WBC 5.7 6.1 4.4   HGB 12.9* 13.8 14.9   MCV 89 90 91   * 161 135*     Most Recent 3 BMP's:  Recent Labs   Lab Test 03/07/24  1202 03/06/24  1908 03/06/24  0639 03/06/24  0034 03/05/24 1857 11/30/23  1040   NA  --   --  138  --  139 139   POTASSIUM 4.0 3.5 3.4  3.3*  --  3.4 4.1   CHLORIDE  --   --  100  --  101 101   CO2  --   --  24  --  25 26   BUN  --   --  8.0  --  10.6 13.1   CR  --   --  0.84  --  1.06 0.99   ANIONGAP  --   --  14  --  13 12   DEREK  --   --  8.8  --  9.0 8.9   GLC  --   --  102* 99 96 95  95       ASSESSMENT/PLAN:    (I60.9) Subarachnoid hemorrhage (H)  (primary encounter diagnosis)  (R53.81) Physical deconditioning  Comment: acute/ongoing  After a fall at home  Plan: PT and OT, non surgical,     (G93.40) Encephalopathy  (R41.89) Cognitive impairment  Comment: acute/ongoing  Plan: PT and OT,  to assist with discharge planning    (G47.33) KAMRAN on CPAP  Comment: ongoing  Plan: monitor SaO2 at rest and with activity, Cpap with home settings    (G40.909) Seizure disorder (H)  Comment: ongoing  Found to have carbamazepine toxicity, dose decreased   Plan: continue carbamazepine 400mg BID, levetiracetam 1500mg BID      Orders:  CBC, BMP and carbamazepine level on Monday      Total time spent with patient visit at the skilled nursing facility was 45 min including patient visit and review of  past records. Reviewed neurology, internal medicine progress notes , lab and imaging results, discussed POC and medications at bedside with patient    Electronically signed by:  Tonya Lynn Haase, APRN CNP                   Sincerely,        Tonya Lynn Haase, APRN CNP

## 2024-03-11 NOTE — PROGRESS NOTES
Clinic Care Coordination Contact  Care Coordination Transition Communication    Clinical Data: Patient was hospitalized at [facility] from 5/5/24 to 3/8/24 with diagnosis of   Mild acute traumatic left temporal lobe subarachnoid hemorrhage without mass effect  Tiny left fronto-temporal subdural hematoma  Tiny avulsion fracture at the anterior spine of maxilla  Chronic appearing Fracture of nasal bones  Mechanical fall at home.     Assessment: Patient has transitioned to TCU/ARU for short term rehabilitation:    Facility Name: [facility]  Transition Communication:  Notified facility of Primary Care- Care Coordination support via Epic fax.    Plan: Care Coordinator will await notification from facility staff informing of patient's discharge plans/needs. Care Coordinator will review chart and outreach to facility staff every 4 weeks and as needed.     ESTHER Muñoz   Care Coordination Team  [phone]

## 2024-03-12 VITALS
BODY MASS INDEX: 25.06 KG/M2 | RESPIRATION RATE: 18 BRPM | HEIGHT: 71 IN | TEMPERATURE: 97.5 F | HEART RATE: 82 BPM | WEIGHT: 179 LBS | DIASTOLIC BLOOD PRESSURE: 74 MMHG | SYSTOLIC BLOOD PRESSURE: 107 MMHG | OXYGEN SATURATION: 96 %

## 2024-03-12 LAB — METHYLMALONATE SERPL-SCNC: 0.33 UMOL/L (ref 0–0.4)

## 2024-03-12 NOTE — PROGRESS NOTES
"[facility] GERIATRICS    Chief Complaint   Patient presents with    RECHECK     HPI:  Leighton Morales is a 68 year old  (1955), who is being seen today for an episodic care visit at: [facility]) [25]. Today's concern is:   Traumatic SAH patient sitting up in chair in room, denies headache, vision changes, dizziness, he is working with therapy walking > 150 feet using a RW with SBA  Cognitive impairment: BIMS 10/15, SBT 10/28, CPT of 4.5/5.6 indicating moderate cognitive impairment  Discussed at length with nursing, discussed discharge planning, patient lives alone and has had issues with medication compliance resulting in hospitalizations. Family support and involvement is variable, complicated family dynamics.   KAMRAN: SaO2 96% on room air, denies SOB, cough, congestion  Seizure d/o: no seizures noted.     Allergies, and PMH/PSH reviewed in EPIC today.  REVIEW OF SYSTEMS:  10 point ROS of systems including Constitutional, Eyes, Respiratory, Cardiovascular, Gastroenterology, Genitourinary, Integumentary, Musculoskeletal, Psychiatric were all negative except for pertinent positives noted in my HPI.    Objective:   /74   Pulse 82   Temp 97.5  F (36.4  C)   Resp 18   Ht 1.803 m (5' 11\")   Wt 81.2 kg (179 lb)   SpO2 96%   BMI 24.97 kg/m    GENERAL APPEARANCE:  Alert, in no distress  ENT:  Mouth and posterior oropharynx normal, moist mucous membranes, normal hearing acuity  EYES:  EOM, conjunctivae, lids, pupils and irises normal, PERRL  RESP:  respiratory effort and palpation of chest normal, lungs clear to auscultation , no respiratory distress  CV:  Palpation and auscultation of heart done , regular rate and rhythm, no murmur, rub, or gallop, no edema  ABDOMEN:  normal bowel sounds, soft, nontender, no hepatosplenomegaly or other masses  M/S:   patient sitting up in chair at bedside  SKIN:  Inspection of skin and subcutaneous tissue baseline  NEURO:   speech wnl  PSYCH:  affect and " mood normal    Recent labs in [facility] reviewed by me today.  and Most Recent 3 CBC's:  Recent Labs   Lab Test 03/06/24  0639 03/05/24 1857 11/30/23  1040   WBC 5.7 6.1 4.4   HGB 12.9* 13.8 14.9   MCV 89 90 91   * 161 135*     Most Recent 3 BMP's:  Recent Labs   Lab Test 03/07/24  1202 03/06/24  1908 03/06/24  0639 03/06/24  0034 03/05/24 1857 11/30/23  1040   NA  --   --  138  --  139 139   POTASSIUM 4.0 3.5 3.4  3.3*  --  3.4 4.1   CHLORIDE  --   --  100  --  101 101   CO2  --   --  24  --  25 26   BUN  --   --  8.0  --  10.6 13.1   CR  --   --  0.84  --  1.06 0.99   ANIONGAP  --   --  14  --  13 12   DEREK  --   --  8.8  --  9.0 8.9   GLC  --   --  102* 99 96 95  95       Assessment/Plan:  (I60.9) Subarachnoid hemorrhage (H)  (primary encounter diagnosis)  (R53.81) Physical deconditioning  Comment: acute/ongoing, no change  After a fall at home  Plan: PT and OT, non surgical,      (G93.40) Encephalopathy  (R41.89) Cognitive impairment  Comment: acute/ongoing, no change  BIMS 10/15, SBT 10/28, CPT 4.5/5.6 moderate impairment  Plan: PT and OT,  to assist with discharge planning  Patient lives at home alone, variable family involvement     (G47.33) KAMRAN on CPAP  Comment: ongoing, no change  Plan: monitor SaO2 at rest and with activity, Cpap with home settings     (G40.909) Seizure disorder (H)  Comment: ongoing, no change  Found to have carbamazepine toxicity, dose decreased   Plan: continue carbamazepine 400mg BID, levetiracetam 1500mg BID       MED REC REQUIRED  Post Medication Reconciliation Status: medication reconcilation previously completed during another office visit      Orders:  No new orders    Total time with patient visit: 45 minutes including discussions about the POC and care coordination with the patient. Greater than 50% of total time spent with counseling and coordinating care due to reviewed lab findings and medicaitons, discussed POC and therapy with patient at bedside,  discussed POC and discharge planning at length with nursing     Electronically signed by: Tonya Lynn Haase, APRN CNP

## 2024-03-13 ENCOUNTER — TRANSITIONAL CARE UNIT VISIT (OUTPATIENT)
Dept: GERIATRICS | Facility: CLINIC | Age: 69
End: 2024-03-13
Payer: COMMERCIAL

## 2024-03-13 DIAGNOSIS — G40.909 SEIZURE DISORDER (H): ICD-10-CM

## 2024-03-13 DIAGNOSIS — G93.40 ENCEPHALOPATHY: ICD-10-CM

## 2024-03-13 DIAGNOSIS — I60.9 SUBARACHNOID HEMORRHAGE (H): Primary | ICD-10-CM

## 2024-03-13 DIAGNOSIS — R53.81 PHYSICAL DECONDITIONING: ICD-10-CM

## 2024-03-13 DIAGNOSIS — R41.89 COGNITIVE IMPAIRMENT: ICD-10-CM

## 2024-03-13 DIAGNOSIS — G47.33 OSA ON CPAP: ICD-10-CM

## 2024-03-13 PROCEDURE — 99310 SBSQ NF CARE HIGH MDM 45: CPT | Performed by: NURSE PRACTITIONER

## 2024-03-13 NOTE — LETTER
"    3/13/2024        RE: Leighton Morales  [address] [facility] [address]        [facility] GERIATRICS    Chief Complaint   Patient presents with     RECHECK     HPI:  Leighton Morales is a 68 year old  (1955), who is being seen today for an episodic care visit at: [facility]) [25]. Today's concern is:   Traumatic SAH patient sitting up in chair in room, denies headache, vision changes, dizziness, he is working with therapy walking > 150 feet using a RW with SBA  Cognitive impairment: BIMS 10/15, SBT 10/28, CPT of 4.5/5.6 indicating moderate cognitive impairment  Discussed at length with nursing, discussed discharge planning, patient lives alone and has had issues with medication compliance resulting in hospitalizations. Family support and involvement is variable, complicated family dynamics.   KAMRAN: SaO2 96% on room air, denies SOB, cough, congestion  Seizure d/o: no seizures noted.     Allergies, and PMH/PSH reviewed in EPIC today.  REVIEW OF SYSTEMS:  10 point ROS of systems including Constitutional, Eyes, Respiratory, Cardiovascular, Gastroenterology, Genitourinary, Integumentary, Musculoskeletal, Psychiatric were all negative except for pertinent positives noted in my HPI.    Objective:   /74   Pulse 82   Temp 97.5  F (36.4  C)   Resp 18   Ht 1.803 m (5' 11\")   Wt 81.2 kg (179 lb)   SpO2 96%   BMI 24.97 kg/m    GENERAL APPEARANCE:  Alert, in no distress  ENT:  Mouth and posterior oropharynx normal, moist mucous membranes, normal hearing acuity  EYES:  EOM, conjunctivae, lids, pupils and irises normal, PERRL  RESP:  respiratory effort and palpation of chest normal, lungs clear to auscultation , no respiratory distress  CV:  Palpation and auscultation of heart done , regular rate and rhythm, no murmur, rub, or gallop, no edema  ABDOMEN:  normal bowel sounds, soft, nontender, no hepatosplenomegaly or other masses  M/S:   patient sitting up in chair at bedside  SKIN:  Inspection " of skin and subcutaneous tissue baseline  NEURO:   speech wnl  PSYCH:  affect and mood normal    Recent labs in EPIC reviewed by me today.  and Most Recent 3 CBC's:  Recent Labs   Lab Test 03/06/24  0639 03/05/24 1857 11/30/23  1040   WBC 5.7 6.1 4.4   HGB 12.9* 13.8 14.9   MCV 89 90 91   * 161 135*     Most Recent 3 BMP's:  Recent Labs   Lab Test 03/07/24  1202 03/06/24  1908 03/06/24  0639 03/06/24  0034 03/05/24 1857 11/30/23  1040   NA  --   --  138  --  139 139   POTASSIUM 4.0 3.5 3.4  3.3*  --  3.4 4.1   CHLORIDE  --   --  100  --  101 101   CO2  --   --  24  --  25 26   BUN  --   --  8.0  --  10.6 13.1   CR  --   --  0.84  --  1.06 0.99   ANIONGAP  --   --  14  --  13 12   DEREK  --   --  8.8  --  9.0 8.9   GLC  --   --  102* 99 96 95  95       Assessment/Plan:  (I60.9) Subarachnoid hemorrhage (H)  (primary encounter diagnosis)  (R53.81) Physical deconditioning  Comment: acute/ongoing, no change  After a fall at home  Plan: PT and OT, non surgical,      (G93.40) Encephalopathy  (R41.89) Cognitive impairment  Comment: acute/ongoing, no change  BIMS 10/15, SBT 10/28, CPT 4.5/5.6 moderate impairment  Plan: PT and OT,  to assist with discharge planning  Patient lives at home alone, variable family involvement     (G47.33) KAMRAN on CPAP  Comment: ongoing, no change  Plan: monitor SaO2 at rest and with activity, Cpap with home settings     (G40.909) Seizure disorder (H)  Comment: ongoing, no change  Found to have carbamazepine toxicity, dose decreased   Plan: continue carbamazepine 400mg BID, levetiracetam 1500mg BID       MED REC REQUIRED  Post Medication Reconciliation Status: medication reconcilation previously completed during another office visit      Orders:  No new orders    Total time with patient visit: 45 minutes including discussions about the POC and care coordination with the patient. Greater than 50% of total time spent with counseling and coordinating care due to reviewed lab  findings and medicaitons, discussed POC and therapy with patient at bedside, discussed POC and discharge planning at length with nursing     Electronically signed by: Tonya Lynn Haase, APRN CNP         Sincerely,        Tonya Lynn Haase, APRN CNP

## 2024-03-14 ENCOUNTER — DISCHARGE SUMMARY NURSING HOME (OUTPATIENT)
Dept: GERIATRICS | Facility: CLINIC | Age: 69
End: 2024-03-14
Payer: COMMERCIAL

## 2024-03-14 VITALS
SYSTOLIC BLOOD PRESSURE: 95 MMHG | BODY MASS INDEX: 25.06 KG/M2 | HEART RATE: 78 BPM | TEMPERATURE: 97.6 F | OXYGEN SATURATION: 97 % | DIASTOLIC BLOOD PRESSURE: 64 MMHG | RESPIRATION RATE: 18 BRPM | HEIGHT: 71 IN | WEIGHT: 179 LBS

## 2024-03-14 DIAGNOSIS — R53.81 PHYSICAL DECONDITIONING: ICD-10-CM

## 2024-03-14 DIAGNOSIS — I60.9 SUBARACHNOID HEMORRHAGE (H): Primary | ICD-10-CM

## 2024-03-14 DIAGNOSIS — G40.909 SEIZURE DISORDER (H): ICD-10-CM

## 2024-03-14 DIAGNOSIS — G93.40 ENCEPHALOPATHY: ICD-10-CM

## 2024-03-14 DIAGNOSIS — R41.89 COGNITIVE IMPAIRMENT: ICD-10-CM

## 2024-03-14 DIAGNOSIS — G47.33 OSA ON CPAP: ICD-10-CM

## 2024-03-14 PROCEDURE — 99316 NF DSCHRG MGMT 30 MIN+: CPT | Performed by: NURSE PRACTITIONER

## 2024-03-14 NOTE — PROGRESS NOTES
[facility] GERIATRICS DISCHARGE SUMMARY  PATIENT'S NAME: Leighton Morales  YOB: 1955  MEDICAL RECORD NUMBER:  [id number]  Place of Service where encounter took place:  [facility]) [25]    PRIMARY CARE PROVIDER AND CLINIC RESPONSIBLE AFTER TRANSFER:   Los Mcelroy MD, [address] [place] [id number]    [facility] [place] [place] Provider     Transferring providers: Tonya Lynn Haase, KARISSA SUERO, Dr Tosin MD  Recent Hospitalization/ED:  [facility] Hospital stay 3/5/24 to 3/8/24.  Date of SNF Admission: March 08, 2024  Date of SNF (anticipated) Discharge: March 22, 2024  Discharged to: previous independent home  Cognitive Scores: BIMS: 10/15, CPT: 4.5/5.6, and Short blessed: 10/28  Physical Function: Ambulating >150 ft with RW  DME: Walker    CODE STATUS/ADVANCE DIRECTIVES DISCUSSION:  Prior   ALLERGIES: Patient has no known allergies.    NURSING FACILITY COURSE   Medication Changes/Rationale:   See assessment and plan    Summary of nursing facility stay:   Patient progressed to walking > 150 feet using a RW, independent with ADL's, found to have moderate cognitive impairment as above, patient lives alone with some help from family but there are family dynamics that play a role in the support he gets at home. Patient will discharge to home with home PT, OT  and RN     Discharge Medications:  MED REC REQUIRED  Post Medication Reconciliation Status: discharge medications reconciled and changed, per note/orders       Current Outpatient Medications   Medication Sig Dispense Refill    carBAMazepine (TEGRETOL XR) 400 MG 12 hr tablet Take 1 tablet (400 mg) by mouth 2 times daily      levETIRAcetam (KEPPRA) 1000 MG tablet Take 1.5 tablets (1,500 mg) by mouth 2 times daily - Oral 270 tablet 3    rosuvastatin (CRESTOR) 20 MG tablet Take 1 tablet (20 mg) by mouth daily 90 tablet 3    sildenafil (VIAGRA) 100 MG tablet Take 1 tablet (100 mg) by mouth daily as needed (erectile dysfunction) 30  "tablet 5          Controlled medications:   not applicable/none     Past Medical History:   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     Closed fracture of unspecified phalanx or phalanges of hand     Erectile dysfunction, unspecified erectile dysfunction type 12/29/2017    Hyperlipidemia     Nasal bones, closed fracture     KAMRAN on CPAP     Other affections of shoulder region, not elsewhere classified     Other convulsions      Physical Exam:   Vitals: BP 95/64   Pulse 78   Temp 97.6  F (36.4  C)   Resp 18   Ht 1.803 m (5' 11\")   Wt 81.2 kg (179 lb)   SpO2 97%   BMI 24.97 kg/m    BMI: Body mass index is 24.97 kg/m .  GENERAL APPEARANCE:  Alert, in no distress  ENT:  Mouth and posterior oropharynx normal, moist mucous membranes, normal hearing acuity  EYES:  EOM, conjunctivae, lids, pupils and irises normal, PERRL  RESP:  respiratory effort and palpation of chest normal, lungs clear to auscultation , no respiratory distress  CV:  Palpation and auscultation of heart done , regular rate and rhythm, no murmur, rub, or gallop, no edema  ABDOMEN:  normal bowel sounds, soft, nontender, no hepatosplenomegaly or other masses  M/S:   patient sitting up in chair at bedside  SKIN:  Inspection of skin and subcutaneous tissue baseline  NEURO:   speech wnl  PSYCH:  affect and mood normal     SNF labs: Recent labs in [facility] reviewed by me today.  and Most Recent 3 CBC's:  Recent Labs   Lab Test 03/06/24  0639 03/05/24 1857 11/30/23  1040   WBC 5.7 6.1 4.4   HGB 12.9* 13.8 14.9   MCV 89 90 91   * 161 135*     Most Recent 3 BMP's:  Recent Labs   Lab Test 03/07/24  1202 03/06/24  1908 03/06/24  0639 03/06/24  0034 03/05/24  1857 11/30/23  1040   NA  --   --  138  --  139 139   POTASSIUM 4.0 3.5 3.4  3.3*  --  3.4 4.1   CHLORIDE  --   --  100  --  101 101   CO2  --   --  24  --  25 26   BUN  --   --  8.0  --  10.6 13.1   CR  --   --  0.84  --  1.06 0.99   ANIONGAP  --  "  --  14  --  13 12   DEREK  --   --  8.8  --  9.0 8.9   GLC  --   --  102* 99 96 95  95       Assessment/Plan:  (I60.9) Subarachnoid hemorrhage (H)  (primary encounter diagnosis)  (R53.81) Physical deconditioning  Comment: acute/ongoing  After a fall at home  Plan: DC to home with home PT, OT and RN for medication management and home safety non surgical,      (G93.40) Encephalopathy  (R41.89) Cognitive impairment  Comment: acute/ongoing  BIMS 10/15, SBT 10/28, CPT 4.5/5.6 moderate impairment  Plan: Discharge to home with home RN, PT and OT for medication management, cognitive training, home safety  Patient lives at home alone, variable family involvement     (G47.33) KAMRAN on CPAP  Comment: ongoing  Plan:  Cpap with home settings     (G40.909) Seizure disorder (H)  Comment: ongoing  Found to have carbamazepine toxicity, dose decreased   Plan: continue carbamazepine 400mg BID, levetiracetam 1500mg BID  F/u with PCP    DISCHARGE PLAN:  Follow up labs: No labs orders/due  Medical Follow Up:      Follow up with primary care provider in 1-2 weeks  [facility] scheduled appointments:  Appointments in Next Year      Mar 14, 2024  9:00 AM  Discharge Summary with Tonya Lynn Haase, APRN CNP  [facility] ([facility] ) [phone]     Mar 22, 2024  2:30 PM  (Arrive by 2:15 PM)  ED/Hospital Follow Up with Los Mcelroy MD  [facility] ([facility] ) [phone]     Apr 16, 2024 11:40 AM  (Arrive by 11:25 AM)  CT HEAD W/O CONTRAST with EICCT1  [facility] ([facility]) [phone]     Apr 17, 2024  1:30 PM  (Arrive by 1:15 PM)  Return Adult Neurosurg with Tiffany Puente NP  [facility] ([facility] ) [phone]     Feb 03, 2025  4:00 PM  (Arrive by 3:45 PM)  Return Seizure with Carmen Patton MD   [facility] ([facility]) [phone]           Discharge Services: Home Care:  Occupational Therapy, Physical Therapy, and Registered Nurse  Discharge Instructions Verbalized to Patient at Discharge:   None    TOTAL DISCHARGE TIME:   Greater than 30 minutes  Electronically signed by:  Tonya Lynn Haase, APRN CNP     Home care Face to Face documentation done in [facility] attached to Home care orders for [facility]. , Documentation of Face to Face and Certification for Home Health Services    I certify that patient: Leighton Morales is under my care and that I, or a nurse practitioner or physician's assistant working with me, had a face-to-face encounter that meets the physician face-to-face encounter requirements with this patient on: 3/14/2024.    This encounter with the patient was in whole, or in part, for the following medical condition, which is the primary reason for home health care: cognitive impairment, seizure d/o  .    I certify that, based on my findings, the following services are medically necessary home health services: Nursing, Occupational Therapy, and Physical Therapy.    My clinical findings support the need for the above services because: Nurse is needed: To assess medication managment after changes in medications or other medical regimen.., Occupational Therapy Services are needed to assess and treat cognitive ability and address ADL safety due to impairment in ADL training, cognitive impairment., and Physical Therapy Services are needed to assess and treat the following functional impairments: strengthening, enduance, home safety.    Further, I certify that my clinical findings support that this patient is homebound (i.e. absences from home require considerable and taxing effort and are for medical reasons or Confucianism services or infrequently or of short duration when for other reasons) because: Requires assistance of another person or specialized equipment to  access medical services because patient: Requires supervision of another for safe transfer...    Based on the above findings. I certify that this patient is confined to the home and needs intermittent skilled nursing care, physical therapy and/or speech therapy.  The patient is under my care, and I have initiated the establishment of the plan of care.  This patient will be followed by a physician who will periodically review the plan of care.  Physician/Provider to provide follow up care: Los Mcelroy    Attending hospital physician (the Medicare certified PECOS provider): Tonya Lynn Haase, APRN CNP  Physician Signature: See electronic signature associated with these discharge orders.  Date: 3/17/2024

## 2024-03-14 NOTE — LETTER
3/14/2024        RE: Leighton Morales  [address]  [place] [address]        [facility] GERIATRICS DISCHARGE SUMMARY  PATIENT'S NAME: Leighton Morales  YOB: 1955  MEDICAL RECORD NUMBER:  [id number]  Place of Service where encounter took place:  [facility]) [25]    PRIMARY CARE PROVIDER AND CLINIC RESPONSIBLE AFTER TRANSFER:   Los Mcelroy MD, [address]  / [facility][1] [id number]    [facility] [place] Provider     Transferring providers: Tonya Lynn Haase, APRN CNP, Dr Tosin MD  Recent Hospitalization/ED:  [facility] stay 3/5/24 to 3/8/24.  Date of SNF Admission: March 08, 2024  Date of SNF (anticipated) Discharge: March 22, 2024  Discharged to: previous independent home  Cognitive Scores: BIMS: 10/15, CPT: 4.5/5.6, and Short blessed: 10/28  Physical Function: Ambulating >150 ft with RW  DME: Walker    CODE STATUS/ADVANCE DIRECTIVES DISCUSSION:  Prior   ALLERGIES: Patient has no known allergies.    NURSING FACILITY COURSE   Medication Changes/Rationale:   See assessment and plan    Summary of nursing facility stay:   Patient progressed to walking > 150 feet using a RW, independent with ADL's, found to have moderate cognitive impairment as above, patient lives alone with some help from family but there are family dynamics that play a role in the support he gets at home. Patient will discharge to home with home PT, OT  and RN     Discharge Medications:  MED REC REQUIRED  Post Medication Reconciliation Status: discharge medications reconciled and changed, per note/orders       Current Outpatient Medications   Medication Sig Dispense Refill     carBAMazepine (TEGRETOL XR) 400 MG 12 hr tablet Take 1 tablet (400 mg) by mouth 2 times daily       levETIRAcetam (KEPPRA) 1000 MG tablet Take 1.5 tablets (1,500 mg) by mouth 2 times daily - Oral 270 tablet 3     rosuvastatin (CRESTOR) 20 MG tablet Take 1 tablet (20 mg) by mouth daily 90 tablet 3     sildenafil (VIAGRA)  "100 MG tablet Take 1 tablet (100 mg) by mouth daily as needed (erectile dysfunction) 30 tablet 5          Controlled medications:   not applicable/none     Past Medical History:   Past Medical History:   Diagnosis Date     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      Closed fracture of unspecified phalanx or phalanges of hand      Erectile dysfunction, unspecified erectile dysfunction type 12/29/2017     Hyperlipidemia      Nasal bones, closed fracture      KAMRAN on CPAP      Other affections of shoulder region, not elsewhere classified      Other convulsions      Physical Exam:   Vitals: BP 95/64   Pulse 78   Temp 97.6  F (36.4  C)   Resp 18   Ht 1.803 m (5' 11\")   Wt 81.2 kg (179 lb)   SpO2 97%   BMI 24.97 kg/m    BMI: Body mass index is 24.97 kg/m .  GENERAL APPEARANCE:  Alert, in no distress  ENT:  Mouth and posterior oropharynx normal, moist mucous membranes, normal hearing acuity  EYES:  EOM, conjunctivae, lids, pupils and irises normal, PERRL  RESP:  respiratory effort and palpation of chest normal, lungs clear to auscultation , no respiratory distress  CV:  Palpation and auscultation of heart done , regular rate and rhythm, no murmur, rub, or gallop, no edema  ABDOMEN:  normal bowel sounds, soft, nontender, no hepatosplenomegaly or other masses  M/S:   patient sitting up in chair at bedside  SKIN:  Inspection of skin and subcutaneous tissue baseline  NEURO:   speech wnl  PSYCH:  affect and mood normal     SNF labs: Recent labs in [facility] reviewed by me today.  and Most Recent 3 CBC's:  Recent Labs   Lab Test 03/06/24  0639 03/05/24 1857 11/30/23  1040   WBC 5.7 6.1 4.4   HGB 12.9* 13.8 14.9   MCV 89 90 91   * 161 135*     Most Recent 3 BMP's:  Recent Labs   Lab Test 03/07/24  1202 03/06/24  1908 03/06/24  0639 03/06/24  0034 03/05/24 1857 11/30/23  1040   NA  --   --  138  --  139 139   POTASSIUM 4.0 3.5 3.4  3.3*  --  3.4 4.1   CHLORIDE  --   --  100  --  101 101   CO2  --   --  24  " --  25 26   BUN  --   --  8.0  --  10.6 13.1   CR  --   --  0.84  --  1.06 0.99   ANIONGAP  --   --  14  --  13 12   DEREK  --   --  8.8  --  9.0 8.9   GLC  --   --  102* 99 96 95  95       Assessment/Plan:  (I60.9) Subarachnoid hemorrhage (H)  (primary encounter diagnosis)  (R53.81) Physical deconditioning  Comment: acute/ongoing  After a fall at home  Plan: DC to home with home PT, OT and RN for medication management and home safety non surgical,      (G93.40) Encephalopathy  (R41.89) Cognitive impairment  Comment: acute/ongoing  BIMS 10/15, SBT 10/28, CPT 4.5/5.6 moderate impairment  Plan: Discharge to home with home RN, PT and OT for medication management, cognitive training, home safety  Patient lives at home alone, variable family involvement     (G47.33) KAMRAN on CPAP  Comment: ongoing  Plan:  Cpap with home settings     (G40.909) Seizure disorder (H)  Comment: ongoing  Found to have carbamazepine toxicity, dose decreased   Plan: continue carbamazepine 400mg BID, levetiracetam 1500mg BID  F/u with PCP    DISCHARGE PLAN:  Follow up labs: No labs orders/due  Medical Follow Up:      Follow up with primary care provider in 1-2 weeks  [facility] scheduled appointments:  Appointments in Next Year      Mar 14, 2024  9:00 AM  Discharge Summary with Tonya Lynn Haase, APRN CNP  [facility] ([facility] ) [phone]     Mar 22, 2024  2:30 PM  (Arrive by 2:15 PM)  ED/Hospital Follow Up with Los Mcelroy MD  [facility] ([facility] ) [phone]     Apr 16, 2024 11:40 AM  (Arrive by 11:25 AM)  CT HEAD W/O CONTRAST with EICCT1  [facility] ([facility]) [phone]     Apr 17, 2024  1:30 PM  (Arrive by 1:15 PM)  Return Adult Neurosurg with Tiffany Puente NP  [facility] ([facility] ) [phone]      Feb 03, 2025  4:00 PM  (Arrive by 3:45 PM)  Return Seizure with MD MICHELE Arriaza [facility] [place] [facility] ( [facility]) [phone]           Discharge Services: Home Care:  Occupational Therapy, Physical Therapy, and Registered Nurse  Discharge Instructions Verbalized to Patient at Discharge:   None    TOTAL DISCHARGE TIME:   Greater than 30 minutes  Electronically signed by:  Tonya Lynn Haase, APRN CNP     Home care Face to Face documentation done in [facility] attached to Home care orders for [facility]. , Documentation of Face to Face and Certification for Home Health Services    I certify that patient: Leighton Morales is under my care and that I, or a nurse practitioner or physician's assistant working with me, had a face-to-face encounter that meets the physician face-to-face encounter requirements with this patient on: 3/14/2024.    This encounter with the patient was in whole, or in part, for the following medical condition, which is the primary reason for home health care: cognitive impairment, seizure d/o  .    I certify that, based on my findings, the following services are medically necessary home health services: Nursing, Occupational Therapy, and Physical Therapy.    My clinical findings support the need for the above services because: Nurse is needed: To assess medication managment after changes in medications or other medical regimen.., Occupational Therapy Services are needed to assess and treat cognitive ability and address ADL safety due to impairment in ADL training, cognitive impairment., and Physical Therapy Services are needed to assess and treat the following functional impairments: strengthening, enduance, home safety.    Further, I certify that my clinical findings support that this patient is homebound (i.e. absences from home require considerable and taxing effort and are for medical reasons or Bahai services or infrequently or of short duration when  for other reasons) because: Requires assistance of another person or specialized equipment to access medical services because patient: Requires supervision of another for safe transfer...    Based on the above findings. I certify that this patient is confined to the home and needs intermittent skilled nursing care, physical therapy and/or speech therapy.  The patient is under my care, and I have initiated the establishment of the plan of care.  This patient will be followed by a physician who will periodically review the plan of care.  Physician/Provider to provide follow up care: Los Mcelroy    Attending hospital physician (the Medicare certified PECOS provider): Tonya Lynn Haase, APRN CNP  Physician Signature: See electronic signature associated with these discharge orders.  Date: 3/17/2024                Sincerely,        Tonya Lynn Haase, APRN CNP

## 2024-03-18 ENCOUNTER — TELEPHONE (OUTPATIENT)
Dept: GERIATRICS | Facility: CLINIC | Age: 69
End: 2024-03-18
Payer: COMMERCIAL

## 2024-03-18 NOTE — TELEPHONE ENCOUNTER
[facility] Lab Note     Provider: Tonya Haase, APRN CNP  Facility: [facility] Facility Type:  TCU    No Known Allergies    Labs Reviewed by provider: BMP and CBC     Verbal Order/Direction given by Provider: Recheck BMP on 3/21/24.    Provider giving Order:  Romeo Leahy MD    Verbal Order given to: Reyna Cervantes RN

## 2024-03-22 ENCOUNTER — TELEPHONE (OUTPATIENT)
Dept: INTERNAL MEDICINE | Facility: CLINIC | Age: 69
End: 2024-03-22

## 2024-03-22 ENCOUNTER — OFFICE VISIT (OUTPATIENT)
Dept: INTERNAL MEDICINE | Facility: CLINIC | Age: 69
End: 2024-03-22
Payer: COMMERCIAL

## 2024-03-22 VITALS
HEART RATE: 78 BPM | WEIGHT: 177.6 LBS | DIASTOLIC BLOOD PRESSURE: 70 MMHG | OXYGEN SATURATION: 100 % | SYSTOLIC BLOOD PRESSURE: 108 MMHG | TEMPERATURE: 98.2 F | BODY MASS INDEX: 24.77 KG/M2

## 2024-03-22 DIAGNOSIS — R56.9 CONVULSIONS, UNSPECIFIED CONVULSION TYPE (H): Primary | ICD-10-CM

## 2024-03-22 DIAGNOSIS — I60.9 SUBARACHNOID HEMORRHAGE (H): ICD-10-CM

## 2024-03-22 DIAGNOSIS — R41.3 MEMORY LOSS: ICD-10-CM

## 2024-03-22 DIAGNOSIS — D64.9 ANEMIA, UNSPECIFIED TYPE: ICD-10-CM

## 2024-03-22 DIAGNOSIS — E78.5 HYPERLIPIDEMIA LDL GOAL <100: ICD-10-CM

## 2024-03-22 DIAGNOSIS — G47.33 OSA (OBSTRUCTIVE SLEEP APNEA): ICD-10-CM

## 2024-03-22 LAB — HGB BLD-MCNC: 13.6 G/DL (ref 13.3–17.7)

## 2024-03-22 PROCEDURE — 80177 DRUG SCRN QUAN LEVETIRACETAM: CPT | Performed by: INTERNAL MEDICINE

## 2024-03-22 PROCEDURE — 83540 ASSAY OF IRON: CPT | Performed by: INTERNAL MEDICINE

## 2024-03-22 PROCEDURE — 99214 OFFICE O/P EST MOD 30 MIN: CPT | Performed by: INTERNAL MEDICINE

## 2024-03-22 PROCEDURE — 85018 HEMOGLOBIN: CPT | Performed by: INTERNAL MEDICINE

## 2024-03-22 PROCEDURE — 36415 COLL VENOUS BLD VENIPUNCTURE: CPT | Performed by: INTERNAL MEDICINE

## 2024-03-22 PROCEDURE — 80156 ASSAY CARBAMAZEPINE TOTAL: CPT | Performed by: INTERNAL MEDICINE

## 2024-03-22 PROCEDURE — 83550 IRON BINDING TEST: CPT | Performed by: INTERNAL MEDICINE
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
## 2024-03-22 NOTE — TELEPHONE ENCOUNTER
Called and spoke to Mady. Relayed message from the provider below. Mady expressed understanding and had no additional questions at this time.     Jody Madsen RN

## 2024-03-22 NOTE — TELEPHONE ENCOUNTER
Home Care is calling regarding an established patient with [facility].       Requesting orders from: Los Mcelroy  Provider is following patient: No       Orders Requested  Delay in start of care to date: 3/25/24 due to staffing      Information was gathered and will be sent to provider for review.  RN will contact Home Care with information after provider review.  Confirmed ok to leave a detailed message with call back.  Contact information confirmed and updated as needed.    Maria Esther Woodard RN

## 2024-03-23 LAB
CARBAMAZEPINE SERPL-MCNC: 7 UG/ML (ref 4–12)
IRON BINDING CAPACITY (ROCHE): 218 UG/DL (ref 240–430)
IRON SATN MFR SERPL: 17 % (ref 15–46)
IRON SERPL-MCNC: 38 UG/DL (ref 61–157)
LEVETIRACETAM SERPL-MCNC: 26 ΜG/ML (ref 10–40)

## 2024-03-25 ENCOUNTER — TELEPHONE (OUTPATIENT)
Dept: INTERNAL MEDICINE | Facility: CLINIC | Age: 69
End: 2024-03-25
Payer: COMMERCIAL

## 2024-03-25 ENCOUNTER — PATIENT OUTREACH (OUTPATIENT)
Dept: CARE COORDINATION | Facility: CLINIC | Age: 69
End: 2024-03-25
Payer: COMMERCIAL

## 2024-03-25 ASSESSMENT — ACTIVITIES OF DAILY LIVING (ADL): DEPENDENT_IADLS:: INDEPENDENT

## 2024-03-25 NOTE — LETTER
[facility]  Patient Centered Plan of Care  About Me:        Patient Name:  Chance Connell    YOB: 1955  Age:         68 year old   Evaristo MRN:    [id number] Telephone Information:  Home Phone [phone]   Mobile [phone]       Address:  [address]  [facility] [address] Email address:  [email or web address]      Emergency Contact(s)    Name Relationship Lgl Grd Work Phone Home Phone Mobile Phone   1. MIGUEL MOSQUERA Daughter No   [phone]   2. NEFTALI URBAN Significant ot* No   [phone]   3. CHANCE CONNELL Father No   [phone]           Primary language:  English     needed? No   [employer]:  [phone] op. 1  Other communication barriers:Cognitive impairment    Preferred Method of Communication:  Phone  Current living arrangement: I live alone; I live in a private home    Mobility Status/ Medical Equipment: Independent        Health Maintenance  Health Maintenance Reviewed: Due/Overdue(vaccines)      My Access Plan  Medical Emergency 911   Primary Clinic Line [facility]* - [phone]   24 Hour Appointment Line [phone] or  [phone]) (toll-free)   24 Hour Nurse Line [phone] (toll-free)   Preferred Urgent Care No data recorded   Preferred [facility]  [phone]     Preferred Pharmacy [facility]  for [facility] [place], [place] - [address] [place] [address]     Behavioral Health Crisis Line The National Suicide Prevention Lifeline at 1-272.595.8304 or Text/Call 974           My Care Team Members  Patient Care Team         Relationship Specialty Notifications Start End    Los Mcelroy MD PCP - General Internal Medicine  12/29/17     Phone: [phone] Fax: [phone] [address] [place] [address]    Cherelle Kamara MD MD Neurology  9/25/15     Phone: [phone] Fax: [phone]         [address] [facility] [address]    Lilibeth  Los RECIO MD Assigned PCP   9/15/17     Phone: [phone] Fax: [phone] [address] [facility] [address]    Carmen Patton MD Assigned Neuroscience Provider   10/23/20     Phone: [phone] Fax: [phone] [address] [facility] [address] [facility] [address]    Sarita Ponce, RADHAW Clinic Care Coordinator Primary Care - CC Admissions 3/11/24     Phone: [phone]         Sarita Ponce LSW Lead Care Coordinator Primary Care - CC Admissions 3/25/24     Phone: [phone]                     My Care Plans  Self Management and Treatment Plan    Care Plan      Action Plans on File:                       Advance Care Plans/Directives:   Advanced Care Plan/Directives on file:   No    Discussed with patient/caregiver(s): No data recorded           My Medical and Care Information  Problem List   Patient Active Problem List   Diagnosis    Convulsions (H)    Mixed hyperlipidemia    KAMRAN on CPAP    Erectile dysfunction, unspecified erectile dysfunction type    Anxiety    Benign prostatic hyperplasia with lower urinary tract symptoms, symptom details unspecified    Ataxia    Confusion    Subarachnoid hemorrhage (H)    Unsteadiness      Current Medications and Allergies:  See printed Medication Report.    Care Coordination Start Date: 3/11/2024   Frequency of Care Coordination: monthly, more frequently as needed     Form Last Updated: 03/25/2024

## 2024-03-25 NOTE — PROGRESS NOTES
Clinic Care Coordination Contact  [facility]/Voicemail    Clinical Data:  Care Coordinator Outreach - Discharge from TCU     Outreach Documentation Number of Outreach Attempt   3/25/2024   8:55 AM 1   3/25/2024   8:56 AM 1       Left message on patient's voicemail with call back information and requested return call.    Plan:  Care Coordinator will send care coordination introduction letter with care coordinator contact information and explanation of care coordination services via [email or web address].  Care Coordinator will try to reach patient again in 3-5 business days.      ESTHER Benitez /  ESTHER Joel  [facility] Primary Care   Care Coordination  [facility]  3/25/2024 8:56 AM

## 2024-03-25 NOTE — TELEPHONE ENCOUNTER
Home Care is calling regarding an established patient with [facility].       Requesting orders from: Los Mcelroy  Provider is following patient: No       Orders Requested    Skilled Nursing  Request for initial certification (first set of orders)   Frequency:  1x/wk for 3 wks then every other week for 5 weeks   To work on med management and seizure disorder.     Occupational Therapy  Request for initial evaluation and treatment (one time) (first set of orders)     Confirmed ok to leave a detailed message with call back.  Contact information confirmed and updated as needed.    Shira Chauhan RN

## 2024-03-25 NOTE — LETTER
[facility] CARE COORDINATION  [address] [facility] [address]     March 25, 2024    Leighton Morales  [address] [facility] [address]      Dear Leighton,    I am a clinic care coordinator who works with Los Mcelroy MD with the [facility]. I have been trying to reach you recently to introduce Clinic Care Coordination. Below is a description of clinic care coordination and how I can further assist you.       The clinic care coordination team is made up of a registered nurse, , financial resource worker and community health worker who understand the health care system. The goal of clinic care coordination is to help you manage your health and improve access to the health care system. Our team works alongside your provider to assist you in determining your health and social needs. We can help you obtain health care and community resources, providing you with necessary information and education. We can work with you through any barriers and develop a care plan that helps coordinate and strengthen the communication between you and your care team.  Our services are voluntary and are offered without charge to you personally.    Please feel free to contact ESTHER Worley at [phone], with any questions or concerns regarding care coordination and what we can offer.      We are focused on providing you with the highest-quality healthcare experience possible.    Sincerely,       ESTHER Benitez / ESTHER Joel  [facility] Primary Care   Care Coordination  [facility]  3/25/2024 9:00 AM

## 2024-03-25 NOTE — PROGRESS NOTES
Clinic Care Coordination Contact   Clinic Care Coordination Contact  OUTREACH with Post Discharge Assessment    Referral Information:  Referral Source:  Discharged from  [facility] 3/14/24.TCU    Primary Diagnosis: Psychosocial Patient was admitted to  [facility]  on 3/3 5/5/24 with Mild acute traumatic left temporal lobe subarachnoid hemorrhage without mass effect  Tiny left fronto-temporal subdural hematoma  Tiny avulsion fracture at the anterior spine of maxilla  Chronic appearing Fracture of nasal bones  Mechanical fall at home  He was [facility] and discharge home on  3/14/24 to home     Chief Complaint   Patient presents with    Clinic Care Coordination - Post Hospital     Sw discharge home from TCU        [place] Utilization:  Clinic Utilization  Difficulty keeping appointments:: No  Compliance Concerns: No  No-Show Concerns: No  No PCP office visit in Past Year: No  Utilization      No Show Count (past year)  1             ED Visits  2             Hospital Admissions  2                    Current as of: 3/25/2024  9:54 AM                Clinical Concerns:  Current Medical Concerns:   Patient Active Problem List   Diagnosis    Convulsions (H)    Mixed hyperlipidemia    KAMRAN on CPAP    Erectile dysfunction, unspecified erectile dysfunction type    Anxiety    Benign prostatic hyperplasia with lower urinary tract symptoms, symptom details unspecified    Ataxia    Confusion    Subarachnoid hemorrhage (H)    Unsteadiness       Current Behavioral Concerns: aware of memory issues  Looking to move to Senior Living  daughter and girlfriend are helping him.   Education Provided to patient: Introduction to self and Care Coordination. Introduction letter mailed per patient request.  Pain  Pain (GOAL):: No  Health Maintenance Reviewed: Due/Overdue (vaccines)      Admission:    Admission Date: 03/08/24  to [facility] .Patient was admitted to  [facility]  on 3/5/5/24 with Mild acute  traumatic left temporal lobe subarachnoid hemorrhage without mass effect  Tiny left fronto-temporal subdural hematoma  Tiny avulsion fracture at the anterior spine of maxilla  Chronic appearing Fracture of nasal bones  Mechanical fall at home      Discharge:   Discharged from TCU on 3/14/24  to home(G93.40) Encephalopathy  (R41.89) Cognitive impairment    Enrollment  Outreach Frequency: monthly, more frequently as needed    Discharge Assessment  How are you doing now that you are home?: good  How are your symptoms? (Red Flag symptoms escalate to triage hotline per guidelines): Improved  Do you feel your condition is stable enough to be safe at home until your provider visit?: Yes  Does the patient have their discharge instructions? : Yes  Does the patient have questions regarding their discharge instructions? : No  Does the patient have all of their medications?: Yes  Do you have questions regarding any of your medications? : No (daughter and girlfriend help)  Discharge follow-up appointment scheduled within 14 calendar days? : Yes (3/22)  Discharge Follow Up Appointment Date: 03/22/24  Discharge Follow Up Appointment Scheduled with?: Primary Care Provider          Medication Management:  Medication review status:   Daughter and girlfriend are assisting  Concern expressed for not taking correctly, Pt is aware of this and accepting of help.    Functional Status:  Dependent ADLs:: Independent  Dependent IADLs:: Independent (daughter helping some)  Bed or wheelchair confined:: No  Mobility Status: Independent  Fallen 2 or more times in the past year?: (!) Yes  Any fall with injury in the past year?: Yes    Living Situation:  Current living arrangement:: I live alone, I live in a private home.  Daughter and girlfriend assisting. Looking at Senior facility in [place] .  Aware he needs a more supportive living environment.    Lifestyle & Psychosocial Needs:    Social Determinants of Health     Food Insecurity: Low Risk   (11/6/2023)    Food Insecurity     Within the past 12 months, did you worry that your food would run out before you got money to buy more?: No     Within the past 12 months, did the food you bought just not last and you didn t have money to get more?: No   Depression: Not at risk (1/29/2024)    PHQ-2     PHQ-2 Score: 0   Housing Stability: Low Risk  (11/6/2023)    Housing Stability     Do you have housing? : Yes     Are you worried about losing your housing?: No   Tobacco Use: Medium Risk (3/22/2024)    Patient History     Smoking Tobacco Use: Former     Smokeless Tobacco Use: Never     Passive Exposure: Not on file   Financial Resource Strain: Low Risk  (11/6/2023)    Financial Resource Strain     Within the past 12 months, have you or your family members you live with been unable to get utilities (heat, electricity) when it was really needed?: No   Alcohol Use: Not on file   Transportation Needs: Low Risk  (11/6/2023)    Transportation Needs     Within the past 12 months, has lack of transportation kept you from medical appointments, getting your medicines, non-medical meetings or appointments, work, or from getting things that you need?: No   Physical Activity: Not on file   Interpersonal Safety: Low Risk  (11/6/2023)    Interpersonal Safety     Do you feel physically and emotionally safe where you currently live?: Yes     Within the past 12 months, have you been hit, slapped, kicked or otherwise physically hurt by someone?: No     Within the past 12 months, have you been humiliated or emotionally abused in other ways by your partner or ex-partner?: No   Stress: Not on file   Social Connections: Not on file     Inadequate nutrition (GOAL):: No  Tube Feeding: No  Inadequate activity/exercise (GOAL):: No  Significant changes in sleep pattern (GOAL): No  Transportation means:: Friend, Family, Public transportation     Tenriism or spiritual beliefs that impact treatment:: No  Mental health management concern  (GOAL):: No  Informal Support system:: Children, Other (Buddhism friends)  daughter and girl friend        Resources and Interventions:  Current Resources:      Community Resources: None     Equipment Currently Used at Home: grab bar, toilet, shower chair  Employment Status: retired     Advance Care Plan/Directive  Advanced Care Plans/Directives on file:: No    Referrals Placed: None     Care Plan:       Patient/Caregiver understanding: good     Outreach Frequency: monthly, more frequently as needed  Future Appointments                In 3 weeks [facility]    In 3 weeks Tiffany Puente NP [facility]    In 10 months Carmen Patton MD [facility]            Plan: Patient will continue to look at living options  and will call [facility] or clinic with any needs or questions.  No further outreach from Care Coordination at this time, Letter of introduction mailed.      ESTHER Benitez for ESTHER Muñoz  [facility] Primary [facility]   Care Coordination  [facility]  3/25/2024 11:06 AM

## 2024-03-25 NOTE — LETTER
[facility] COORDIN[facility]  [address] [facility] [address]     March 25, 2024    Leighton Morales  [address] [place] [address]      Dear Leighton,    I am a clinic care coordinator who works with Los Mcelroy MD with the [facility]. I wanted to thank you for spending the time to talk with me.  Below is a description of clinic care coordination and how I can further assist you.  Today I am helping cover for ESTHER Muñoz     The clinic care coordination team is made up of a registered nurse, , financial resource worker and community health worker who understand the health care system. The goal of clinic care coordination is to help you manage your health and improve access to the health care system. Our team works alongside your provider to assist you in determining your health and social needs. We can help you obtain health care and community resources, providing you with necessary information and education. We can work with you through any barriers and develop a care plan that helps coordinate and strengthen the communication between you and your care team.  Our services are voluntary and are offered without charge to you personally.    Please feel free to contact ESTHER Muñoz at ([phone], with any questions or concerns regarding care coordination and what we can offer.      We are focused on providing you with the highest-quality healthcare experience possible.    Sincerely,       ESTHER Benitez/ for ESTHER Muñoz   [facility]   Care Coordination  [facility]  3/25/2024 10:33 AM     Enclosed: I have enclosed a copy of a 24 Hour Access Plan. This has helpful phone numbers for you to call when needed. Please keep this in an easy to access place to use as needed.

## 2024-03-25 NOTE — PATIENT INSTRUCTIONS
Labs as ordered  Follow-up with neurology ([facility]) re: seizure disorder management  CT brain 4/16/24 as scheduled  Appointment with neurosurgery 4/17/2024 to review CT results and other management  Contact [place] [facility] re: history obstructive sleep apnea and getting back on CPAP therapy as untreated slee apnea will contribute to fatigue and memory issues and increase risk for further seizures  Continue rosuvastatin for cholesterol management  Call  [phone] or use [facility] to schedule a future lab appointment  fasting in 6 months to recheck cholesterol levels.   For fasting labs, please refrain from eating for 8 hours or more.   Drink 2 glasses of water before your lab appointment. It is fine to take your  oral medications on the morning of the lab test as usual  Daughter to continue assisting with setting up medications and girlfriend to help with checking that meds have been taken until future possible move to assisted living

## 2024-03-27 NOTE — TELEPHONE ENCOUNTER
Called and spoke with Mary Alice. Relayed providers message from below. Mary Alice is appreciative.

## 2024-04-01 ENCOUNTER — TELEPHONE (OUTPATIENT)
Dept: INTERNAL MEDICINE | Facility: CLINIC | Age: 69
End: 2024-04-01
Payer: COMMERCIAL

## 2024-04-01 NOTE — TELEPHONE ENCOUNTER
Apryl WHITTEN calling to request verbal orders for home care. See order details below.       Home Care is calling regarding an established patient with [facility].       Requesting orders from: Los Mcelroy  Provider is following patient: Yes  Is this a 60-day recertification request?  No    Orders Requested    Physical Therapy  Request for initial evaluation and treatment (one time)     Occupational Therapy  Request for continuation of care with no increase or decrease in frequency  Frequency: 1x every other week for 6 weeks.         Verbal orders given.  Home Care will send orders for provider to sign.  Confirmed ok to leave a detailed message with call back.  Contact information confirmed and updated as needed.    Justice L. Phoenix, RN

## 2024-04-08 ENCOUNTER — TELEPHONE (OUTPATIENT)
Dept: INTERNAL MEDICINE | Facility: CLINIC | Age: 69
End: 2024-04-08
Payer: COMMERCIAL

## 2024-04-08 NOTE — TELEPHONE ENCOUNTER
Home Care is calling regarding an established patient with [facility].  {  Requesting orders from: Los Mcelroy  Provider is following patient: Yes  Is this a 60-day recertification request?  no    Orders Requested    Physical Therapy  Frequency: 1 x a wk for 1 wk  1 every 2 wks for 6 wks    Social Work  consult        Confirmed ok to leave a detailed message with call back.  Contact information confirmed and updated as needed.    Toney Briggs RN

## 2024-04-09 NOTE — TELEPHONE ENCOUNTER
Called and left message for Hellen with the provider's response. Advised Hellen to call the clinic back with any additional questions and/or concerns.     Jody Madsen RN

## 2024-04-12 ENCOUNTER — NURSE TRIAGE (OUTPATIENT)
Dept: INTERNAL MEDICINE | Facility: CLINIC | Age: 69
End: 2024-04-12
Payer: COMMERCIAL
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
## 2024-04-16 ENCOUNTER — ANCILLARY PROCEDURE (OUTPATIENT)
Dept: CT IMAGING | Facility: CLINIC | Age: 69
End: 2024-04-16
Attending: PHYSICIAN ASSISTANT
Payer: COMMERCIAL

## 2024-04-16 DIAGNOSIS — I60.9 SAH (SUBARACHNOID HEMORRHAGE) (H): ICD-10-CM

## 2024-04-16 PROCEDURE — 70450 CT HEAD/BRAIN W/O DYE: CPT

## 2024-04-17 ENCOUNTER — OFFICE VISIT (OUTPATIENT)
Dept: NEUROSURGERY | Facility: CLINIC | Age: 69
End: 2024-04-17
Payer: COMMERCIAL

## 2024-04-17 VITALS
SYSTOLIC BLOOD PRESSURE: 127 MMHG | DIASTOLIC BLOOD PRESSURE: 80 MMHG | OXYGEN SATURATION: 100 % | HEART RATE: 63 BPM | HEIGHT: 71 IN | BODY MASS INDEX: 24.86 KG/M2 | WEIGHT: 177.6 LBS

## 2024-04-17 DIAGNOSIS — I60.9 SUBARACHNOID HEMORRHAGE (H): Primary | ICD-10-CM

## 2024-04-17 PROCEDURE — 99213 OFFICE O/P EST LOW 20 MIN: CPT | Performed by: NURSE PRACTITIONER

## 2024-04-17 PROCEDURE — G2211 COMPLEX E/M VISIT ADD ON: HCPCS | Performed by: NURSE PRACTITIONER

## 2024-04-17 NOTE — LETTER
4/17/2024         RE: Leighton Morales  [address]  [place] [facility] [address]        Dear Colleague,    Thank you for referring your patient, Leighton Morales, to the [facility]. Please see a copy of my visit note below.    [facility]   Follow Up Visit      HPI: Leighton Morales is a 68 year old male with history of seizures, dementia, and cavernous  malformation s/p surgery in 1995. Patient presents for follow-up  of SAH.  Patient was admitted on 3/5/2024 after a fall.  Imaging revealed a subarachnoid hemorrhage along the left temporal lobe and possible thin subdural hemorrhage.  Repeat imaging and neuro exam were stable.  Neurosurgery recommended to follow-up in 1 month with repeat head CT.  Today, patient reports feeling  better compared to when he was in the hospital. He  denies any headaches, dizziness, nausea, vision/speech changes, or other neurological changes. Denies any new symptoms. His main concern is chronic  memory loss, recently discussed with PCP, plans to follow-up with his Neurology clinic  ([facility]).     Past Medical History:   Diagnosis Date     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      Closed fracture of unspecified phalanx or phalanges of hand      Erectile dysfunction, unspecified erectile dysfunction type 12/29/2017     Hyperlipidemia      Nasal bones, closed fracture      KAMRAN on CPAP      Other affections of shoulder region, not elsewhere classified      Other convulsions          Past Surgical History:   Procedure Laterality Date     [facility] NONSPECIFIC PROCEDURE  1960    L Inquinal hernia     [facility] NONSPECIFIC PROCEDURE  0190    Repair Nasal fracture     [facility] NONSPECIFIC PROCEDURE  [id number]    temporal lobectomy - excision  av malformation     [facility] NONSPECIFIC PROCEDURE      Dental Extraction Age 13       Current Outpatient Medications   Medication Sig Dispense Refill     carBAMazepine (TEGRETOL XR) 400 MG 12 hr tablet Take 1 tablet  (400 mg) by mouth 2 times daily       levETIRAcetam (KEPPRA) 1000 MG tablet Take 1.5 tablets (1,500 mg) by mouth 2 times daily - Oral 270 tablet 3     rosuvastatin (CRESTOR) 20 MG tablet Take 1 tablet (20 mg) by mouth daily 90 tablet 3     sildenafil (VIAGRA) 100 MG tablet Take 1 tablet (100 mg) by mouth daily as needed (erectile dysfunction) 30 tablet 5     No current facility-administered medications for this visit.       No Known Allergies    Social History     Socioeconomic History     Marital status: Single     Spouse name: None     Number of children: None     Years of education: None     Highest education level: None   Tobacco Use     Smoking status: Former     Current packs/day: 0.00     Types: Cigarettes     Quit date: 1978     Years since quittin.6     Smokeless tobacco: Never   Substance and Sexual Activity     Alcohol use: Yes     Comment:  1-2 beers  per week     Drug use: Never     Sexual activity: Yes     Partners: Female     Social Determinants of Health     Financial Resource Strain: Low Risk  (2023)    Financial Resource Strain      Within the past 12 months, have you or your family members you live with been unable to get utilities (heat, electricity) when it was really needed?: No   Food Insecurity: Low Risk  (2023)    Food Insecurity      Within the past 12 months, did you worry that your food would run out before you got money to buy more?: No      Within the past 12 months, did the food you bought just not last and you didn t have money to get more?: No   Transportation Needs: Low Risk  (2023)    Transportation Needs      Within the past 12 months, has lack of transportation kept you from medical appointments, getting your medicines, non-medical meetings or appointments, work, or from getting things that you need?: No    [facility]    Social Connections   Interpersonal Safety:  "Low Risk  (11/6/2023)    Interpersonal Safety      Do you feel physically and emotionally safe where you currently live?: Yes      Within the past 12 months, have you been hit, slapped, kicked or otherwise physically hurt by someone?: No      Within the past 12 months, have you been humiliated or emotionally abused in other ways by your partner or ex-partner?: No   Housing Stability: Low Risk  (11/6/2023)    Housing Stability      Do you have housing? : Yes      Are you worried about losing your housing?: No       Family History   Adopted: Yes       ROS: 10 point ROS neg other than the symptoms noted above in the HPI.    Vital Signs: /80   Pulse 63   Ht 1.803 m (5' 11\")   Wt 80.6 kg (177 lb 9.6 oz)   SpO2 100%   BMI 24.77 kg/m      Examination:  Constitutional:  Alert, well nourished, NAD.  HEENT: Normocephalic, atraumatic.   Pulm:  Without shortness of breath or audible respiratory sounds.  CV:  No pitting edema of BLE.      Neurological:  Awake  Alert  Oriented x 3  Baseline dementia  Speech clear  Cranial nerves II - XII intact  PERRL  EOMI  Face symmetric  Tongue midline  Motor exam: Moves all extremities equally with appropriate  strength   Sensation intact   Finger to Nose smooth   Pronator drift negative   Gait: Able to stand from a seated position. Normal non-antalgic, non-myelopathic gait.    Imaging:   EXAM: CT HEAD W/O CONTRAST  LOC[facility]: [facility]  DATE: 4/16/2024                                                       IMPRESSION:  1.  Interval resolution of subarachnoid hemorrhage over the left temporal convexity. No new sites of acute intracranial hemorrhage.  2.  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 No progressive mass effect.  3.  Additional chronic changes as above.    Assessment  68 year old male with history of seizures and dementia who presents " for follow-up of SAH s/p fall. Repeat head CT on 4/16/2024 shows interval resolution of SAH, no acute findings noted.     Plan:   -Follow up with Neurology clinic for chronic memory loss  -Follow up with our Neurosurgery  clinic as needed   -Advised patient to call our clinic with any questions. Patient voiced understanding and agreement.      Discussed with Dr. Kaplan.     Tiffany Puente, CNP  [facility] Neurosurgery  Tel [phone]  Pager [phone]      Again, thank you for allowing me to participate in the care of your patient.        Sincerely,        Tiffany Puente, NP

## 2024-04-17 NOTE — NURSING NOTE
"Leighton Morales is a 68 year old male who presents for:  Chief Complaint   Patient presents with    RECHECK     subarachnoid hemorrhage        Initial Vitals:  /80   Pulse 63   Ht 5' 11\" (1.803 m)   Wt 177 lb 9.6 oz (80.6 kg)   SpO2 100%   BMI 24.77 kg/m   Estimated body mass index is 24.77 kg/m  as calculated from the following:    Height as of this encounter: 5' 11\" (1.803 m).    Weight as of this encounter: 177 lb 9.6 oz (80.6 kg).. Body surface area is 2.01 meters squared. BP completed using cuff size: regular  Data Unavailable        Theresa Tristan   "

## 2024-04-17 NOTE — PROGRESS NOTES
[facility]   Follow Up Visit      HPI: Leighton Morales is a 68 year old male with history of seizures, dementia, and cavernous  malformation s/p surgery in 1995. Patient presents for follow-up  of SAH.  Patient was admitted on 3/5/2024 after a fall.  Imaging revealed a subarachnoid hemorrhage along the left temporal lobe and possible thin subdural hemorrhage.  Repeat imaging and neuro exam were stable.  Neurosurgery recommended to follow-up in 1 month with repeat head CT.  Today, patient reports feeling  better compared to when he was in the hospital. He  denies any headaches, dizziness, nausea, vision/speech changes, or other neurological changes. Denies any new symptoms. His main concern is chronic  memory loss, recently discussed with PCP, plans to follow-up with his Neurology clinic  ([facility]).     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     Closed fracture of unspecified phalanx or phalanges of hand     Erectile dysfunction, unspecified erectile dysfunction type 12/29/2017    Hyperlipidemia     Nasal bones, closed fracture     KAMRAN on CPAP     Other affections of shoulder region, not elsewhere classified     Other convulsions          Past Surgical History:   Procedure Laterality Date    [facility] NONSPECIFIC PROCEDURE  1960    L Inquinal hernia    [facility] NONSPECIFIC PROCEDURE  0190    Repair Nasal fracture    [facility] NONSPECIFIC PROCEDURE  048620    temporal lobectomy - excision  av malformation    [facility] NONSPECIFIC PROCEDURE      Dental Extraction Age 13       Current Outpatient Medications   Medication Sig Dispense Refill    carBAMazepine (TEGRETOL XR) 400 MG 12 hr tablet Take 1 tablet (400 mg) by mouth 2 times daily      levETIRAcetam (KEPPRA) 1000 MG tablet Take 1.5 tablets (1,500 mg) by mouth 2 times daily - Oral 270 tablet 3    rosuvastatin (CRESTOR) 20 MG tablet Take 1 tablet (20 mg) by mouth daily 90 tablet 3    sildenafil (VIAGRA) 100 MG  tablet Take 1 tablet (100 mg) by mouth daily as needed (erectile dysfunction) 30 tablet 5     No current facility-administered medications for this visit.       No Known Allergies    Social History     Socioeconomic History    Marital status: Single     Spouse name: None    Number of children: None    Years of education: None    Highest education level: None   Tobacco Use    Smoking status: Former     Current packs/day: 0.00     Types: Cigarettes     Quit date: 1978     Years since quittin.6    Smokeless tobacco: Never   Substance and Sexual Activity    Alcohol use: Yes     Comment:  1-2 beers  per week    Drug use: Never    Sexual activity: Yes     Partners: Female     Social Determinants of Health     Financial Resource Strain: Low Risk  (2023)    Financial Resource Strain     Within the past 12 months, have you or your family members you live with been unable to get utilities (heat, electricity) when it was really needed?: No   Food Insecurity: Low Risk  (2023)    Food Insecurity     Within the past 12 months, did you worry that your food would run out before you got money to buy more?: No     Within the past 12 months, did the food you bought just not last and you didn t have money to get more?: No   Transportation Needs: Low Risk  (2023)    Transportation Needs     Within the past 12 months, has lack of transportation kept you from medical appointments, getting your medicines, non-medical meetings or appointments, work, or from getting things that you need?: No    [facility]    Social Connections   Interpersonal Safety: Low Risk  (2023)    Interpersonal Safety     Do you feel physically and emotionally safe where you currently live?: Yes     Within the past 12 months, have you been hit, slapped, kicked or otherwise physically hurt by someone?: No     Within the past 12 months, have you  "been humiliated or emotionally abused in other ways by your partner or ex-partner?: No   Housing Stability: Low Risk  (11/6/2023)    Housing Stability     Do you have housing? : Yes     Are you worried about losing your housing?: No       Family History   Adopted: Yes       ROS: 10 point ROS neg other than the symptoms noted above in the HPI.    Vital Signs: /80   Pulse 63   Ht 1.803 m (5' 11\")   Wt 80.6 kg (177 lb 9.6 oz)   SpO2 100%   BMI 24.77 kg/m      Examination:  Constitutional:  Alert, well nourished, NAD.  HEENT: Normocephalic, atraumatic.   Pulm:  Without shortness of breath or audible respiratory sounds.  CV:  No pitting edema of BLE.      Neurological:  Awake  Alert  Oriented x 3  Baseline dementia  Speech clear  Cranial nerves II - XII intact  PERRL  EOMI  Face symmetric  Tongue midline  Motor exam: Moves all extremities equally with appropriate  strength   Sensation intact   Finger to Nose smooth   Pronator drift negative   Gait: Able to stand from a seated position. Normal non-antalgic, non-myelopathic gait.    Imaging:   EXAM: CT HEAD W/O CONTRAST  LOCATION: [facility]  DATE: 4/16/2024                                                       IMPRESSION:  1.  Interval resolution of subarachnoid hemorrhage over the left temporal convexity. No new sites of acute intracranial hemorrhage.  2.  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 No progressive mass effect.  3.  Additional chronic changes as above.    Assessment  68 year old male with history of seizures and dementia who presents for follow-up of SAH s/p fall. Repeat head CT on 4/16/2024 shows interval resolution of SAH, no acute findings noted.     Plan:   -Follow up with Neurology clinic for chronic memory loss  -Follow up with our Neurosurgery  clinic as needed   -Advised patient to call our clinic with " any questions. Patient voiced understanding and agreement.      Discussed with Dr. Kaplan.     Tiffany Puente [facility]  Tel [phone]  Pager [phone]

## 2024-04-24 DIAGNOSIS — Z53.9 DIAGNOSIS NOT YET DEFINED: Primary | ICD-10-CM

## 2024-04-24 PROCEDURE — G0180 MD CERTIFICATION HHA PATIENT: HCPCS | Performed by: INTERNAL MEDICINE

## 2024-05-21 ENCOUNTER — TELEPHONE (OUTPATIENT)
Dept: INTERNAL MEDICINE | Facility: CLINIC | Age: 69
End: 2024-05-21
Payer: COMMERCIAL

## 2024-05-21 NOTE — TELEPHONE ENCOUNTER
Called and spoke to Monserrat. Relayed message from the provider below. Monserrat expressed understanding and had no additional questions at this time.     Jody Madsen RN

## 2024-05-21 NOTE — TELEPHONE ENCOUNTER
Home Care is calling regarding an established patient with [facility].       Requesting orders from: [facility]  Provider is following patient: Yes  Is this a 60-day recertification request?  Yes    Orders Requested    Skilled Nursing  Request for recertification   Frequency:  1x/wk for 1 wks 1 every other wk for 6wks    Information was gathered and will be sent to provider for review.  RN will contact Home Care with information after provider review.  Confirmed ok to leave a detailed message with call back.  Contact information confirmed and updated as needed.    Sara Bhagat RN

## 2024-06-26 DIAGNOSIS — Z53.9 DIAGNOSIS NOT YET DEFINED: Primary | ICD-10-CM

## 2024-06-26 PROCEDURE — 99207 PR MD RECERTIFICATION HHA PT: CPT | Performed by: INTERNAL MEDICINE

## 2024-06-26 PROCEDURE — G0179 MD RECERTIFICATION HHA PT: HCPCS | Performed by: INTERNAL MEDICINE

## 2024-07-29 ENCOUNTER — TRANSFERRED RECORDS (OUTPATIENT)
Dept: HEALTH INFORMATION MANAGEMENT | Facility: CLINIC | Age: 69
End: 2024-07-29
Payer: COMMERCIAL

## 2024-08-15 ENCOUNTER — TELEPHONE (OUTPATIENT)
Dept: INTERNAL MEDICINE | Facility: CLINIC | Age: 69
End: 2024-08-15
Payer: COMMERCIAL

## 2024-08-15 NOTE — TELEPHONE ENCOUNTER
Spoke with [facility] assisted living nursing staff. They state that pt is now followed by [facility]. Nurse states that pt was recently prescribed new med by that staff and they are waiting for the new prescribed med to arrive and that pt was informed about this. Pt has known memory loss. Staff there will speak with pt and again remind him of this and future questions will be addressed by [facility] staff. Removed myself from PCP given transfer of care

## 2024-08-15 NOTE — TELEPHONE ENCOUNTER
Patient is currently residing at [facility] for past 6 months. He is not sure if this is a permanent or temporary placement, it started as temporary but he has not decided if this is where he wants to stay.     Patient is concerned his medical rights will/are not being protected. He is told by staff he is taking a new medication, but will not be told the name of medication or why he is taking it.   He states he has been taking medications his entire life and knows what he takes any why. He also keeps copies of visit notes for times that he is forgetful, and reviewing those notes help him remember.   - Patient is asking if PCP has any suggestions for him to protect his medical rights.       Writer reviewed viewable 7/29/24 Neurology notes with patient. Patient is prescribed Donepezil 5 mg tablet (5 mg daily for 1 month, then increase to 10 mg daily) for mild dementia.   Patient recalls being told a medication was suggested at his appointment but assumed the medication was a suggestion, not an order. The provider told patient that the suggested medication is for older folks that have issues as they age.  Patient wrote down the medication name and directions for his own knowledge, states he appreciated the information.    Patient is asking if he is able to have a copy of his 7/29/24 Neurology visit for his own records.   Writer provided patient with contact info for [facility] if Neurology and advised he will need to call to request a copy of those notes. Patient agrees and has no further questions.       Can we leave a detailed message on this number? YES  Phone number patient can be reached at: Home number on file [phone] (home)    Maria Esther Woodard RN  [facility] Triage

## 2024-08-26 ENCOUNTER — OFFICE VISIT (OUTPATIENT)
Dept: NEUROLOGY | Facility: CLINIC | Age: 69
End: 2024-08-26
Payer: COMMERCIAL

## 2024-08-26 VITALS
HEART RATE: 71 BPM | WEIGHT: 171.6 LBS | HEIGHT: 71 IN | DIASTOLIC BLOOD PRESSURE: 70 MMHG | TEMPERATURE: 97.5 F | BODY MASS INDEX: 24.02 KG/M2 | SYSTOLIC BLOOD PRESSURE: 110 MMHG

## 2024-08-26 DIAGNOSIS — G40.909 SEIZURE DISORDER (H): ICD-10-CM

## 2024-08-26 DIAGNOSIS — G40.909 SEIZURE SYNDROME (H): ICD-10-CM

## 2024-08-26 DIAGNOSIS — G40.119 PARTIAL EPILEPSY WITH INTRACTABLE EPILEPSY (H): ICD-10-CM

## 2024-08-26 RX ORDER — CHOLECALCIFEROL (VITAMIN D3) 50 MCG
1 TABLET ORAL DAILY
COMMUNITY

## 2024-08-26 RX ORDER — DONEPEZIL HYDROCHLORIDE 5 MG/1
TABLET, FILM COATED ORAL
COMMUNITY
Start: 2024-07-29

## 2024-08-26 RX ORDER — LEVETIRACETAM 1000 MG/1
TABLET ORAL
Qty: 270 TABLET | Refills: 3 | Status: SHIPPED | OUTPATIENT
Start: 2024-08-26

## 2024-08-26 RX ORDER — CARBAMAZEPINE 400 MG/1
400 TABLET, EXTENDED RELEASE ORAL 2 TIMES DAILY
Qty: 180 TABLET | Refills: 3 | Status: SHIPPED | OUTPATIENT
Start: 2024-08-26

## 2024-08-26 NOTE — PATIENT INSTRUCTIONS
Continue Levetiracetam 1500 mg am and 1500 mg pm      Continue Carbamazepine  mg twice a day  (1 tablet twice a day) - Mr. Morales requires same mft (RISING) for carbamazepine.     Follow up 3-6  months      Follow up  with Dr. Mabry       [email or web address]://www.saray.nih.gov/health/lewy-body-dementia/what-lewy-body-dementia-causes-symptoms-and-treatments    Carmen Patton MD
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
## 2024-10-16 ENCOUNTER — OFFICE VISIT (OUTPATIENT)
Dept: URGENT CARE | Facility: URGENT CARE | Age: 69
End: 2024-10-16
Payer: COMMERCIAL

## 2024-10-16 VITALS
SYSTOLIC BLOOD PRESSURE: 112 MMHG | HEART RATE: 66 BPM | WEIGHT: 177 LBS | OXYGEN SATURATION: 99 % | BODY MASS INDEX: 24.69 KG/M2 | TEMPERATURE: 97.3 F | DIASTOLIC BLOOD PRESSURE: 66 MMHG

## 2024-10-16 DIAGNOSIS — M79.604 PAIN OF RIGHT LOWER EXTREMITY: Primary | ICD-10-CM

## 2024-10-16 LAB
ANION GAP SERPL CALCULATED.3IONS-SCNC: 6 MMOL/L (ref 3–14)
BUN SERPL-MCNC: 14 MG/DL (ref 7–30)
CALCIUM SERPL-MCNC: 10.1 MG/DL (ref 8.5–10.1)
CHLORIDE BLD-SCNC: 107 MMOL/L (ref 94–109)
CO2 SERPL-SCNC: 31 MMOL/L (ref 20–32)
CREAT SERPL-MCNC: 1.1 MG/DL (ref 0.66–1.25)
D DIMER PPP FEU-MCNC: 0.46 UG/ML FEU (ref 0–0.5)
EGFRCR SERPLBLD CKD-EPI 2021: 73 ML/MIN/1.73M2
GLUCOSE BLD-MCNC: 100 MG/DL (ref 70–99)
POTASSIUM BLD-SCNC: 5 MMOL/L (ref 3.4–5.3)
SODIUM SERPL-SCNC: 144 MMOL/L (ref 135–145)

## 2024-10-16 PROCEDURE — 36415 COLL VENOUS BLD VENIPUNCTURE: CPT | Performed by: PHYSICIAN ASSISTANT

## 2024-10-16 PROCEDURE — 85379 FIBRIN DEGRADATION QUANT: CPT | Performed by: PHYSICIAN ASSISTANT

## 2024-10-16 PROCEDURE — 80048 BASIC METABOLIC PNL TOTAL CA: CPT | Performed by: PHYSICIAN ASSISTANT

## 2024-10-16 PROCEDURE — 99214 OFFICE O/P EST MOD 30 MIN: CPT | Performed by: PHYSICIAN ASSISTANT

## 2024-10-16 RX ORDER — DONEPEZIL HYDROCHLORIDE 10 MG/1
TABLET, FILM COATED ORAL
COMMUNITY
Start: 2024-10-14

## 2024-10-17 NOTE — PROGRESS NOTES
SUBJECTIVE:  Chief Complaint   Patient presents with    Knee Pain     Right side knee has had pain for 7 days and no injury. There is no pain while sitting still its fine with movement its worse.     Leighton Morales is a 68 year old male presents with a chief complaint of right calf pain for 1 week without injury.  Worse with walking. 3    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     Closed fracture of unspecified phalanx or phalanges of hand     Erectile dysfunction, unspecified erectile dysfunction type 2017    Hyperlipidemia     Nasal bones, closed fracture     KAMRAN on CPAP     Other affections of shoulder region, not elsewhere classified     Other convulsions      Current Outpatient Medications   Medication Sig Dispense Refill    carBAMazepine (TEGRETOL XR) 400 MG 12 hr tablet Take 1 tablet (400 mg) by mouth 2 times daily. Rising mft 180 tablet 3    donepezil (ARICEPT) 10 MG tablet       donepezil (ARICEPT) 5 MG tablet Take 1 tablet (5 mg) by mouth once daily. After 1 month, increase to 10mg a day.      levETIRAcetam (KEPPRA) 1000 MG tablet Take 1.5 tablets (1,500 mg) by mouth 2 times daily - Oral 270 tablet 3    melatonin 5 MG tablet Take 1 tablet (5 mg) by mouth at bedtime. 90 tablet 3    rosuvastatin (CRESTOR) 20 MG tablet Take 1 tablet (20 mg) by mouth daily 90 tablet 3    sildenafil (VIAGRA) 100 MG tablet Take 1 tablet (100 mg) by mouth daily as needed (erectile dysfunction) 30 tablet 5    vitamin D3 (CHOLECALCIFEROL) 50 mcg (2000 units) tablet Take 1 tablet by mouth daily.       Social History     Tobacco Use    Smoking status: Former     Current packs/day: 0.00     Types: Cigarettes     Quit date: 1978     Years since quittin.1    Smokeless tobacco: Never   Substance Use Topics    Alcohol use: Yes     Comment:  1-2 beers  per week       ROS:  Review of systems negative except as stated above.    EXAM:   /66   Pulse 66   Temp 97.3  F  (36.3  C) (Tympanic)   Wt 80.3 kg (177 lb)   SpO2 99%   BMI 24.69 kg/m    Gen: healthy,alert,no distress  Extremity: some general tenderness of right calf, greater varicosities on right  CHEST: clear to auscultation  CV: regular rate and rhythm  SKIN: no suspicious lesions or rashes    Results for orders placed or performed in visit on 10/16/24   D dimer, quantitative     Status: Normal   Result Value Ref Range    D-Dimer Quantitative 0.46 0.00 - 0.50 ug/mL FEU    Narrative    This D-dimer assay is intended for use in conjunction with a clinical pretest probability assessment model to exclude pulmonary embolism (PE) and deep venous thrombosis (DVT) in outpatients suspected of PE or DVT. The cut-off value is 0.50 ug/mL FEU.    For patients 50 years of age or older, the application of age-adjusted cut-off values for D-Dimer may increase the specificity without significant effect on sensitivity. The literature suggested calculation age adjusted cut-off in ug/L = age in years x 10 ug/L. The results in this laboratory are reported as ug/mL rather than ug/L. The calculation for age adjusted cut off in ug/mL= age in years x 0.01 ug/mL. For example, the cut off for a 76 year old male is 76 x 0.01 ug/mL = 0.76 ug/mL (760 ug/L).    M Umm et al. Age adjusted D-dimer cut-off levels to rule out pulmonary embolism: The ADJUST-PE Study. GERARD 2014;311:2834-6339.; HJ Karen et al. Diagnostic accuracy of conventional or age adjusted D-dimer cutoff values in older patients with suspected venous thromboembolism. Systemic review and meta-analysis. BMJ 2013:346:f2492.   Basic metabolic panel  (Ca, Cl, CO2, Creat, Gluc, K, Na, BUN)     Status: Abnormal   Result Value Ref Range    Sodium 144 135 - 145 mmol/L    Potassium 5.0 3.4 - 5.3 mmol/L    Chloride 107 94 - 109 mmol/L    Carbon Dioxide (CO2) 31 20 - 32 mmol/L    Anion Gap 6 3 - 14 mmol/L    Urea Nitrogen 14 7 - 30 mg/dL    Creatinine 1.10 0.66 - 1.25 mg/dL    GFR Estimate 73  >60 mL/min/1.73m2    Calcium 10.1 8.5 - 10.1 mg/dL    Glucose 100 (H) 70 - 99 mg/dL         ASSESSMENT:   (M79.604) Pain of right lower extremity  (primary encounter diagnosis)  Comment: DVT rule out  Plan: D dimer, quantitative, Basic metabolic panel          (Ca, Cl, CO2, Creat, Gluc, K, Na, BUN)        Follow up with PCP if symptoms worsen or fail to improve

## 2024-10-18 ENCOUNTER — ANCILLARY PROCEDURE (OUTPATIENT)
Dept: GENERAL RADIOLOGY | Facility: CLINIC | Age: 69
End: 2024-10-18
Attending: INTERNAL MEDICINE
Payer: COMMERCIAL

## 2024-10-18 ENCOUNTER — OFFICE VISIT (OUTPATIENT)
Dept: INTERNAL MEDICINE | Facility: CLINIC | Age: 69
End: 2024-10-18
Payer: COMMERCIAL

## 2024-10-18 VITALS
BODY MASS INDEX: 24.72 KG/M2 | RESPIRATION RATE: 16 BRPM | HEIGHT: 71 IN | TEMPERATURE: 97.8 F | DIASTOLIC BLOOD PRESSURE: 77 MMHG | HEART RATE: 66 BPM | SYSTOLIC BLOOD PRESSURE: 132 MMHG | OXYGEN SATURATION: 98 % | WEIGHT: 176.6 LBS

## 2024-10-18 DIAGNOSIS — M25.561 ACUTE PAIN OF RIGHT KNEE: ICD-10-CM

## 2024-10-18 DIAGNOSIS — M25.561 ACUTE PAIN OF RIGHT KNEE: Primary | ICD-10-CM

## 2024-10-18 DIAGNOSIS — G31.83 MODERATE LEWY BODY DEMENTIA, UNSPECIFIED WHETHER BEHAVIORAL, PSYCHOTIC, OR MOOD DISTURBANCE OR ANXIETY (H): ICD-10-CM

## 2024-10-18 DIAGNOSIS — E78.5 HYPERLIPIDEMIA LDL GOAL <100: ICD-10-CM

## 2024-10-18 DIAGNOSIS — F02.B0 MODERATE LEWY BODY DEMENTIA, UNSPECIFIED WHETHER BEHAVIORAL, PSYCHOTIC, OR MOOD DISTURBANCE OR ANXIETY (H): ICD-10-CM

## 2024-10-18 DIAGNOSIS — G40.909 SEIZURE DISORDER (H): ICD-10-CM

## 2024-10-18 DIAGNOSIS — Z12.5 SCREENING FOR PROSTATE CANCER: ICD-10-CM

## 2024-10-18 PROBLEM — R41.0 CONFUSION: Status: RESOLVED | Noted: 2023-11-06 | Resolved: 2024-10-18

## 2024-10-18 PROBLEM — R27.0 ATAXIA: Status: RESOLVED | Noted: 2023-11-06 | Resolved: 2024-10-18

## 2024-10-18 PROBLEM — I60.9 SUBARACHNOID HEMORRHAGE (H): Status: RESOLVED | Noted: 2024-03-05 | Resolved: 2024-10-18

## 2024-10-18 PROCEDURE — 73560 X-RAY EXAM OF KNEE 1 OR 2: CPT | Mod: TC | Performed by: RADIOLOGY

## 2024-10-18 PROCEDURE — 99214 OFFICE O/P EST MOD 30 MIN: CPT | Performed by: INTERNAL MEDICINE

## 2024-10-18 NOTE — PROGRESS NOTES
ASSESSMENT:    1. Acute pain of right knee  X-ray shows the medial calcinosis with joint space overall preserved.  Pain with ambulation.  Ligament exam grossly intact against resistance.  Patient to see Ortho for possible cortisone injection versus other.  May use topical Voltaren as needed  - XR Knee Standing Right 2 Views; Future  - Orthopedic  Referral; Future  - diclofenac (VOLTAREN) 1 % topical gel; Apply 2 g topically 2 times daily as needed for moderate pain.    2. Moderate Lewy body dementia, unspecified whether behavioral, psychotic, or mood disturbance or anxiety (H)  Progressive.  Patient now living in assisted living where medications are managed.  Being managed by neurology. See recent  August neurology note in chart re: plan    3. Seizure disorder (H)  No seizure activity since 1999.  Managed by neurology     4. Hyperlipidemia LDL goal <100  Lipids normal May 2024.  Continue rosuvastatin.  Repeat labs 6 months  - Comprehensive metabolic panel; Future  - Lipid panel reflex to direct LDL Fasting; Future    5. Screening for prostate cancer  Due for repeat screening in 6 months.  Last PSA normal  - Prostate Specific Antigen Screen; Future        PLAN:  Xray right knee  Referral to  [facility] right right knee. Central schedulers will contact you to schedule an appointment or you may contact the  Representative at: [phone].    May apply topical Voltaren/Diclofenac gel to the area of knee pain twice a day as needed for pain  Continue other medications  Follow-up with  Neurology as scheduled   Pt/daughter to notify [facility] for now that I will be assuming primary care of the patient per patient request  I would recommend  a  Flu vaccine (influenza risk reduction) and Updated covid booster vaccine if not already done at your assisted living facility. Get at a pharmacy   Call  [phone] or use [employer] to schedule a future lab appointment  fasting in 6 months.   For  "fasting labs, please refrain from eating for 8 hours or more.   Drink 2 glasses of water before your lab appointment. It is fine to take your  oral medications on the morning of the lab test as usual           Subjective   Lester is a 68 year old, presenting for the following health issues:  Establish Care    History of Present Illness       Reason for visit:  Leg pain / swelling  Symptom onset:  1-2 weeks ago  Symptoms include:  Leg pain, swelling  Symptom intensity:  Severe  Symptom progression:  Staying the same  Had these symptoms before:  No  What makes it worse:  Going up stairs, walking makes it worse  What makes it better:  Gel from assisted living   He is taking medications regularly.         ED/UC Followup:    Facility:  [facility]  Date of visit: 10/16/24  Reason for visit: Knee pain  Current Status: Still having some pain, no swelling or discoloration    Most recent lab results reviewed with pt.      Patient had previously been followed by me.  Then because of history of worsening dementia with diagnosis of Lewy body dementia, patient moved to assisted living facility earlier this year at  [facility] in [place] and care was assumed by [facility]. Patient underwent a Medicare wellness exam with them in August 2024.  However, patient states he has been unhappy with their care and is that he will be due detailed enough evaluation of his complaints and wishes to have care transferred back to me for now.  Patient is accompanied by his daughter Renuka.  Being managed by neurology for Lewy body dementia and seizure disorder.  Most recent neurology note from August 2024 reviewed in the chart.  Medications are now being managed by assisted living due to patient's previous trouble with remembering to take his medications. Per neuro note, \" he has more dementia symptoms specifically, some hallucinations at night, some acting out dreams at night, memory decline, " "difficulties with activities of daily living.\"  Recently seen in urgent care for right knee pain.  Negative D-dimer.  No additional evaluation or treatment at that time.  Patient complains of pain with ambulation and going up stairs.  Has had pain badness at times that he had to crawl up the stairs.  The stairs is in his assisted living facility.  Patient has option of taking elevator but patient states that option is   sometimes slow because there are many elderly people in his building take the elevator.  Patient denies swelling in the knee. NO redness or increased warmth       Additional ROS:   Constitutional, HEENT, Cardiovascular, Pulmonary, GI and , Neuro, MSK and Psych review of systems/symptoms are otherwise negative or unchanged from previous, except as noted above.      OBJECTIVE:  /77 (BP Location: Left arm, Patient Position: Sitting, Cuff Size: Adult Regular)   Pulse 66   Temp 97.8  F (36.6  C)   Resp 16   Ht 1.803 m (5' 11\")   Wt 80.1 kg (176 lb 9.6 oz)   SpO2 98%   BMI 24.63 kg/m     Estimated body mass index is 24.63 kg/m  as calculated from the following:    Height as of this encounter: 1.803 m (5' 11\").    Weight as of this encounter: 80.1 kg (176 lb 9.6 oz).     Neck: no adenopathy. Thyroid normal to palpation. No bruits  Pulm: Lungs clear to auscultation   CV: Regular rates and rhythm  GI: Soft, nontender, Normal active bowel sounds, No hepatosplenomegaly or masses palpable  Ext: Peripheral pulses intact. No edema.  Tenderness to palpation right knee medial joint line.  No effusion.  No increased warmth.  Ligament exam of knee grossly intact.  Slight antalgic gait when patient first gets up from the chair on his own but then normalizes with progressive walking  Neuro: Normal strength and tone, sensory exam grossly normal.  Alert and answering direct questions appropriately.  More generalized memory questioning deferred       (Chart documentation was completed, in part, with Rolando" voice-recognition software. Even though reviewed, some grammatical, spelling, and word errors may remain.)    Los Mcelroy MD  Internal Medicine Department  [facility]

## 2024-10-18 NOTE — PATIENT INSTRUCTIONS
Xray right knee  Referral to  [facility] right right knee. Central schedulers will contact you to schedule an appointment or you may contact the  Representative at: [phone].    May apply topical Voltaren/Diclofenac gel to the area of knee pain twice a day as needed for pain  Continue other medications  Follow-up with  Neurology as scheduled   Pt/daughter to notify [facility] for now that I will be assuming primary care of the patient per patient request  I would recommend  a  Flu vaccine (influenza risk reduction) and Updated covid booster vaccine if not already done at your assisted living facility. Get at a pharmacy   Call  [phone] or use [employer] to schedule a future lab appointment  fasting in 6 months.   For fasting labs, please refrain from eating for 8 hours or more.   Drink 2 glasses of water before your lab appointment. It is fine to take your  oral medications on the morning of the lab test as usual

## 2024-10-23 NOTE — PROGRESS NOTES
SPORTS MEDICINE CLINIC NEW PATIENT VISIT    REFERRAL SOURCE: Los Mcelroy MD    HISTORY OF PRESENT ILLNESS  Leighton Morales is a 69 year old male with lewy body dementia and HLD presenting as a new patient with  right knee pain    When did problem start?/Trauma associated with onset?:  - 3 weeks ago without injury  - no trauma    Location & description of pain:  - pain is medial and lateral knee    Exacerbating factors:   - walking, first steps in the morning    Remitting factors:  - sitting with legs elevated, occasionally stretching calves   - voltaren gel- wife says sometimes it causes pain     Previous Treatments:  -Medications: voltaren gel, advil  -Rehabilitation: No   -Durable Medical Equipment:No  -Injections: No  -Modalities: No  -Other Providers seen: IM Dr. Mcelroy    Sports, Hobbies, Employment:  - no current hobbies or employment.  In an assisted living facility.    Average hours of sleep per night: 5-6 hours    Average minutes of exercise per day: tries to walk up and down the stairs in his building    Area of Problem  11/4/24  Date 2 Date 3 Date 4 Date 5   Function Ability in last week - % of Baseline (0 is worst & 100 is best) *       Sport/Activity Ability in last week - % of Baseline (0 is worst & 100 is best) *       Pain Level in the last week (0 is best & 10 is worst) 5/10       *intermittently but occasionally after about 5-7 minutes of walking he can have pain    Additional Information of consideration:  -Poor Balance?None  -Numbness/paresthesias in extremities?None    MEDICATIONS    Current Outpatient Medications:     carBAMazepine (TEGRETOL XR) 400 MG 12 hr tablet, Take 1 tablet (400 mg) by mouth 2 times daily. Rising mft, Disp: 180 tablet, Rfl: 3    diclofenac (VOLTAREN) 1 % topical gel, Apply 2 g topically 2 times daily as needed for moderate pain., Disp: , Rfl:     donepezil (ARICEPT) 10 MG tablet, , Disp: , Rfl:     donepezil (ARICEPT) 5 MG tablet, Take 1 tablet (5 mg) by mouth once daily.  After 1 month, increase to 10mg a day., Disp: , Rfl:     levETIRAcetam (KEPPRA) 1000 MG tablet, Take 1.5 tablets (1,500 mg) by mouth 2 times daily - Oral, Disp: 270 tablet, Rfl: 3    melatonin 5 MG tablet, Take 1 tablet (5 mg) by mouth at bedtime., Disp: 90 tablet, Rfl: 3    rosuvastatin (CRESTOR) 20 MG tablet, Take 1 tablet (20 mg) by mouth daily, Disp: 90 tablet, Rfl: 3    sildenafil (VIAGRA) 100 MG tablet, Take 1 tablet (100 mg) by mouth daily as needed (erectile dysfunction), Disp: 30 tablet, Rfl: 5    vitamin D3 (CHOLECALCIFEROL) 50 mcg (2000 units) tablet, Take 1 tablet by mouth daily., Disp: , Rfl:     ALLERGIES  No Known Allergies    PAST MEDICAL HISTORY  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     Closed fracture of unspecified phalanx or phalanges of hand     Erectile dysfunction, unspecified erectile dysfunction type 2017    Hyperlipidemia     Nasal bones, closed fracture     KAMRAN on CPAP     Other affections of shoulder region, not elsewhere classified     Other convulsions        PAST SURGICAL HISTORY  Past Surgical History:   Procedure Laterality Date    [facility] NONSPECIFIC PROCEDURE  1960    L Inquinal hernia    [facility] NONSPECIFIC PROCEDURE  0190    Repair Nasal fracture    [facility] NONSPECIFIC PROCEDURE  [id number]    temporal lobectomy - excision  av malformation    [facility] NONSPECIFIC PROCEDURE      Dental Extraction Age 13       SOCIAL HISTORY  Social History     Socioeconomic History    Marital status: Single     Spouse name: Not on file    Number of children: Not on file    Years of education: Not on file    Highest education level: Not on file   Occupational History    Not on file   Tobacco Use    Smoking status: Former     Current packs/day: 0.00     Types: Cigarettes     Quit date: 1978     Years since quittin.1    Smokeless tobacco: Never   Substance and Sexual Activity    Alcohol use: Yes     Comment:  1-2 beers  per week    Drug use: Never     Sexual activity: Yes     Partners: Female   Other Topics Concern    Parent/sibling w/ CABG, MI or angioplasty before 65F 55M? Not Asked   Social History Narrative    Not on file     Social Drivers of Health     Financial Resource Strain: Low Risk  (11/6/2023)    Financial Resource Strain     Within the past 12 months, have you or your family members you live with been unable to get utilities (heat, electricity) when it was really needed?: No   Food Insecurity: Low Risk  (11/6/2023)    Food Insecurity     Within the past 12 months, did you worry that your food would run out before you got money to buy more?: No     Within the past 12 months, did the food you bought just not last and you didn t have money to get more?: No   Transportation Needs: Low Risk  (11/6/2023)    Transportation Needs     Within the past 12 months, has lack of transportation kept you from medical appointments, getting your medicines, non-medical meetings or appointments, work, or from getting things that you need?: No   Physical Activity: Not on file   Stress: Not on file   Social Connections: Unknown (10/6/2022)    Received [facility]    Social Connections     Frequency of Communication with Friends and Family: Not on file   Interpersonal Safety: Low Risk  (11/6/2023)    Interpersonal Safety     Do you feel physically and emotionally safe where you currently live?: Yes     Within the past 12 months, have you been hit, slapped, kicked or otherwise physically hurt by someone?: No     Within the past 12 months, have you been humiliated or emotionally abused in other ways by your partner or ex-partner?: No   Housing Stability: Low Risk  (11/6/2023)    Housing Stability     Do you have housing? : Yes     Are you worried about losing your housing?: No       FAMILY HISTORY  Family History   Adopted: Yes       REVIEW OF SYSTEMS  Complete 12 system Review of Systems  "performed and was negative except for HPI.    VITALS  There were no vitals filed for this visit.    PHYSICAL EXAMINATION   General: Age appropriate appearing, no acute distress  HEENT: normocephalic, atraumatic, sclera non-icteric  Skin: No open skin lesion noted in visible areas.  Respiratory: Non labored breathing, No wheezes.  Cardiac/Vessels: No edema, cyanosis, clubbing noted in all extremities.  Lymph: No palpable lymph node swelling noted around the affected area.  Mental: There was no signs of aberrant behaviors noted. Patient was pleasant throughout the encounter.    Functional Movements: Non-antalgic gait appreciated. Able to complete squat, balance on each leg, walk on heels and walk on toes without difficulty.     RIGHT KNEE:   Inspection: No deformities, atrophy, effusion, warmth   Palpation: TTP medial joint line and lateral patellar facet  Range of Motion (Estimated, Active unless otherwise noted):Flexion 150 degrees, extension to neutral.   Reflexes: trace patellar and achilles reflexes  Sensation:  numbness to light touch in bilateral dorsum of feet, medial and lateral malleoli and right medial knee.  Intact to light touch bilaterally at the bilateral anterior thighs, and left medial knee  Special Testing:   Testing: The following special tests were negative: Valgus stress test, Varus stress test, Lachman's test, Anterior drawer test, Posterior drawer test, Marielos's test, Thessaly test,  Patellofemoral grind test      IMAGING STUDIES:  10/18/2024 right knee radiographs: \"Chondrocalcinosis over the medial and lateral compartments. Joint spaces are otherwise preserved and normally aligned. No acute fracture or joint effusion.\"    I personally reviewed these images and agree with the radiology report and shared the findings with the patient.    IMPRESSION  Leighton Morales is a very pleasant 68 year old male with lewy body dementia and HLD who is presenting today with right knee that seems related to " right knee pseudogout.  He likely has a mild osteoarthritic component to his pain given his age and the chronic nature.  It is reassuring that he has no fracture, avulsion or other osseus abnormalities.    PLAN  The following was discussed with the patient:  Activity Modification: Activity at tolerated  Imaging/Tests: Prior right knee radiographs reviewed as noted above  Rehabilitation: Physical therapy recommended, referral placed  Orthotics/Bracing: None recommended at this time  Medication:  May use over the counter analgesics such as Ibuprofen and Extra strength Tylenol as needed  Interventions: Right knee ultrasound guided corticosteroid injection performed at today's visit.  See separate procedure note below for further details.  Follow-up Plan: 6-8 weeks  Resources Provided: Written and verbal information detailing above findings and plan provided including after visit summary.    SPORTS MEDICINE CLINIC PROCEDURE     Informed Consent:   Following denial of allergy and review of potential side effects and complications including but not necessarily limited to infection, allergic reaction, local tissue breakdown, systemic effects of corticosteroids, elevation of blood glucose, injury to soft tissue and/or nerves and seizure, the patient indicated understanding and agreed to proceed. Written consent was obtained.    Procedure: Large Joint Injection/Arthocentesis: R knee joint    Date/Time: 11/4/2024 6:18 PM    Performed by: Elma Song MD  Authorized by: Elma Song MD    Indications:  Pain and osteoarthritis  Needle Size:  25 G  Guidance: ultrasound    Approach:  Anterolateral  Location:  Knee      Medications:  40 mg triamcinolone 40 MG/ML; 3 mL lidocaine 1 %  Medications comment:  4 ml of 0.9% Sodium Chloride Solution was used for compounding the steroid injectate    Outcome:  Tolerated well, no immediate complications  Procedure discussed: discussed risks, benefits, and alternatives    Consent  Given by:  Patient  Timeout: timeout called immediately prior to procedure    Prep: patient was prepped and draped in usual sterile fashion     Ultrasound was used to ensure safe and accurate needle placement and injection. Ultrasound images of the procedure were permanently stored.        Procedural Details  Transducer:  4-12 mHz Linear   Patient position: Supine with the knee bent to 30 degrees.  Localization process: The suprapatellar recess was localized in a short axis view.  Local anesthesia: Local anesthesia was obtained with 3 cc of 1% lidocaine.  Needle: A 25-gauge, 1.5-inch needle was used for the local anesthesia and injectate.  Approach: A lateral to medial, in plane, approach was used to guide the needle tip into the suprapatellar recess deep to the quadriceps tendon and superficial to the prefemoral fat pad.   Injection: A mixture of 4 cc of normal saline and 1 cc of triamcinolone 40mg/ml was injected into the right knee joint without complication.    Post-procedural care: The patient tolerated the procedure well. He reported excellent pain relief during the anesthetic phase. The patient was asked to ice for improved pain control and avoid submerging the area in water for the next 48 hours to help reduce the risk of infection. The patient was instructed to call the office immediately if there are any questions or concerns. He will plan to follow up in 6-8 weeks.    They were encouraged to message me on FaceRig whenever they needed.    The patient was in agreement with this plan. All questions were answered to the best of my ability.    Total time (face-to-face and non-face-to-face) spent on today's visit was 70 minutes. This included preparation for the visit (i.e. reviewing test results), performance of a medically  appropriate history and examination, placing orders for medications, tests or other procedures, and discussing the plan of care with the patient. This time is exclusive of  procedures performed and time spent teaching.     Elma Song MD, [facility]  Sports Medicine Attending Physician  Department of Physical Medicine & Rehabilitation

## 2024-11-01 ENCOUNTER — OFFICE VISIT (OUTPATIENT)
Dept: NEUROLOGY | Facility: CLINIC | Age: 69
End: 2024-11-01
Payer: COMMERCIAL

## 2024-11-01 VITALS
TEMPERATURE: 97.5 F | WEIGHT: 178.8 LBS | BODY MASS INDEX: 25.03 KG/M2 | SYSTOLIC BLOOD PRESSURE: 128 MMHG | HEART RATE: 69 BPM | HEIGHT: 71 IN | OXYGEN SATURATION: 99 % | DIASTOLIC BLOOD PRESSURE: 74 MMHG

## 2024-11-01 DIAGNOSIS — G31.83 MILD LEWY BODY DEMENTIA WITH OTHER BEHAVIORAL DISTURBANCE (H): Primary | ICD-10-CM

## 2024-11-01 DIAGNOSIS — F02.A18 MILD LEWY BODY DEMENTIA WITH OTHER BEHAVIORAL DISTURBANCE (H): Primary | ICD-10-CM

## 2024-11-01 NOTE — PROGRESS NOTES
"HPI:  Mr. 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 Please see my note from 2020 for a full recapitulation of his history.     He presents today with his daughter and mother. First symptoms were a decline in cognitive abilities manifest as poor performance at work in the setting of different work demands, new software, and remote work. He experienced dream enactment behavior with yelling, kicking, and putting up his fists. More recently, he has had well formed visual hallucinations of a white dog or a person lying in a grassy field. He is living in assisted living where medication management, cooking, and appointments are all handled for him.  He is no longer driving.    Review of psychotropic medications:  Tegretol  Keppra  Donepezil    Exam:  Alert, awake, fluent speech, able to stand up, walk and sit without assistance. Clear lack of short term memory; asking questions that were already covered during the discussion. Mild parkinsonism with mildly decreased armswing, but posture is upright and stride is normal. Mild increase in appendicular tone.    Outside Record Review:  I personally reviewed prior imaging from  3/6/2024 (in the setting of a fall with head trauma):   \"IMPRESSION:     1. Left temporal subarachnoid hemorrhage, similar to prior CT given  differences in technique.  2. Tiny left frontotemporal convexity subdural hematoma.\"    Brain volumes are diffusely less than expected for age without lobar predilection. Chronic right temporal lobe post-surgical changes.    Follow-up CT (1 month later) showed resolution of intracranial hemorrhages with thin subdural hygromas over both cerebral convexities, without mass effect.     B12 and TSH were normal (2023)    A/P:   #Dementia with Lewy bodies    - OK to stop donepezil, go down to 5 mg daily x 2 " weeks, then stop. If cognitive deteriorates, go back on donepezil.    - Melatonin 20 mg nightly to help limit chances of self-injurious dream enactment behavior    - Make sure environment around bed is safe (e.g. no sharp objects).    - PT for balance if in the future he exhibits balance difficulties    - [employer] is a great resource for FAQ videos on neurodegenerative disease    Today, I spent 54 minutes reviewing the chart, personally assessing objective testing, direct patient care, completing documentation and billing.    The longitudinal plan of care for the diagnosis(es)/condition(s) as documented were addressed during this visit. Due to the added complexity in care, I will continue to support Lester in the subsequent management and with ongoing continuity of care.

## 2024-11-01 NOTE — LETTER
"11/1/2024       RE: Leighton Morales  [address]  [place] [address]     Dear Colleague,    Thank you for referring your patient, Leighton Morales, to the  [facility] at [facility]. Please see a copy of my visit note below.    HPI:  Mr. 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 Please see my note from 2020 for a full recapitulation of his history.     He presents today with his daughter and mother. First symptoms were a decline in cognitive abilities manifest as poor performance at work in the setting of different work demands, new software, and remote work. He experienced dream enactment behavior with yelling, kicking, and putting up his fists. More recently, he has had well formed visual hallucinations of a white dog or a person lying in a grassy field. He is living in assisted living where medication management, cooking, and appointments are all handled for him.  He is no longer driving.    Review of psychotropic medications:  Tegretol  Keppra  Donepezil    Exam:  Alert, awake, fluent speech, able to stand up, walk and sit without assistance. Clear lack of short term memory; asking questions that were already covered during the discussion. Mild parkinsonism with mildly decreased armswing, but posture is upright and stride is normal. Mild increase in appendicular tone.    Outside Record Review:  I personally reviewed prior imaging from  3/6/2024 (in the setting of a fall with head trauma):   \"IMPRESSION:     1. Left temporal subarachnoid hemorrhage, similar to prior CT given  differences in technique.  2. Tiny left frontotemporal convexity subdural hematoma.\"    Brain volumes are diffusely less than expected for age without lobar predilection. Chronic right temporal " lobe post-surgical changes.    Follow-up CT (1 month later) showed resolution of intracranial hemorrhages with thin subdural hygromas over both cerebral convexities, without mass effect.     B12 and TSH were normal (2023)    A/P:   #Dementia with Lewy bodies    - OK to stop donepezil, go down to 5 mg daily x 2 weeks, then stop. If cognitive deteriorates, go back on donepezil.    - Melatonin 20 mg nightly to help limit chances of self-injurious dream enactment behavior    - Make sure environment around bed is safe (e.g. no sharp objects).    - PT for balance if in the future he exhibits balance difficulties    - [employer] is a great resource for FAQ videos on neurodegenerative disease    Today, I spent 54 minutes reviewing the chart, personally assessing objective testing, direct patient care, completing documentation and billing.    The longitudinal plan of care for the diagnosis(es)/condition(s) as documented were addressed during this visit. Due to the added complexity in care, I will continue to support Lester in the subsequent management and with ongoing continuity of care.      Again, thank you for allowing me to participate in the care of your patient.      Sincerely,    Greyson Mabry MD

## 2024-11-01 NOTE — PATIENT INSTRUCTIONS
[email or web address]    OK to stop donepezil, go down to 5 mg daily x 2 weeks, then stop. If cognitive deteriorates, go back on donepezil.    Melatonin 20 mg nightly to help limit chances of self-injurious dream enactment behavior    We can order PT if balance becomes a problem    Follow-up in 3 month to discuss other questions

## 2024-11-04 ENCOUNTER — OFFICE VISIT (OUTPATIENT)
Dept: ORTHOPEDICS | Facility: CLINIC | Age: 69
End: 2024-11-04
Attending: INTERNAL MEDICINE
Payer: COMMERCIAL

## 2024-11-04 DIAGNOSIS — M11.261 PSEUDOGOUT OF RIGHT KNEE: Primary | ICD-10-CM

## 2024-11-04 DIAGNOSIS — M25.561 ACUTE PAIN OF RIGHT KNEE: ICD-10-CM

## 2024-11-04 PROCEDURE — 20611 DRAIN/INJ JOINT/BURSA W/US: CPT | Mod: RT | Performed by: STUDENT IN AN ORGANIZED HEALTH CARE EDUCATION/TRAINING PROGRAM

## 2024-11-04 PROCEDURE — 99205 OFFICE O/P NEW HI 60 MIN: CPT | Mod: 25 | Performed by: STUDENT IN AN ORGANIZED HEALTH CARE EDUCATION/TRAINING PROGRAM

## 2024-11-04 RX ORDER — LIDOCAINE HYDROCHLORIDE 10 MG/ML
3 INJECTION, SOLUTION INFILTRATION; PERINEURAL
Status: COMPLETED | OUTPATIENT
Start: 2024-11-04 | End: 2024-11-04

## 2024-11-04 RX ORDER — TRIAMCINOLONE ACETONIDE 40 MG/ML
40 INJECTION, SUSPENSION INTRA-ARTICULAR; INTRAMUSCULAR
Status: COMPLETED | OUTPATIENT
Start: 2024-11-04 | End: 2024-11-04

## 2024-11-04 RX ADMIN — LIDOCAINE HYDROCHLORIDE 3 ML: 10 INJECTION, SOLUTION INFILTRATION; PERINEURAL at 18:18

## 2024-11-04 RX ADMIN — TRIAMCINOLONE ACETONIDE 40 MG: 40 INJECTION, SUSPENSION INTRA-ARTICULAR; INTRAMUSCULAR at 18:18

## 2024-11-04 NOTE — LETTER
11/4/2024      Leighton Morales  [address]  [place] [address]      Dear Colleague,    Thank you for referring your patient, Leighton Morales, to the [facility] [place]. Please see a copy of my visit note below.    [facility]INE CLINIC NEW PATIENT VISIT    REFERRAL SOURCE: Los Mcelroy MD    HISTORY OF PRESENT ILLNESS  Leighton Morales is a 69 year old male with lewy body dementia and HLD presenting as a new patient with  right knee pain    When did problem start?/Trauma associated with onset?:  - 3 weeks ago without injury  - no trauma    Location & description of pain:  - pain is medial and lateral knee    Exacerbating factors:   - walking, first steps in the morning    Remitting factors:  - sitting with legs elevated, occasionally stretching calves   - voltaren gel- wife says sometimes it causes pain     Previous Treatments:  -Medications: voltaren gel, advil  -Rehabilitation: No   -Durable Medical Equipment:No  -Injections: No  -Modalities: No  -Other Providers seen: IM Dr. Mcelroy    Sports, Hobbies, Employment:  - no current hobbies or employment.  In an assisted living facility.    Average hours of sleep per night: 5-6 hours    Average minutes of exercise per day: tries to walk up and down the stairs in his building    Area of Problem  11/4/24  Date 2 Date 3 Date 4 Date 5   Function Ability in last week - % of Baseline (0 is worst & 100 is best) *       Sport/Activity Ability in last week - % of Baseline (0 is worst & 100 is best) *       Pain Level in the last week (0 is best & 10 is worst) 5/10       *intermittently but occasionally after about 5-7 minutes of walking he can have pain    Additional Information of consideration:  -Poor Balance?None  -Numbness/paresthesias in extremities?None    MEDICATIONS    Current Outpatient Medications:      carBAMazepine (TEGRETOL XR) 400 MG 12 hr tablet, Take 1 tablet (400 mg) by mouth 2 times daily. Rising mft, Disp: 180 tablet,  Rfl: 3     diclofenac (VOLTAREN) 1 % topical gel, Apply 2 g topically 2 times daily as needed for moderate pain., Disp: , Rfl:      donepezil (ARICEPT) 10 MG tablet, , Disp: , Rfl:      donepezil (ARICEPT) 5 MG tablet, Take 1 tablet (5 mg) by mouth once daily. After 1 month, increase to 10mg a day., Disp: , Rfl:      levETIRAcetam (KEPPRA) 1000 MG tablet, Take 1.5 tablets (1,500 mg) by mouth 2 times daily - Oral, Disp: 270 tablet, Rfl: 3     melatonin 5 MG tablet, Take 1 tablet (5 mg) by mouth at bedtime., Disp: 90 tablet, Rfl: 3     rosuvastatin (CRESTOR) 20 MG tablet, Take 1 tablet (20 mg) by mouth daily, Disp: 90 tablet, Rfl: 3     sildenafil (VIAGRA) 100 MG tablet, Take 1 tablet (100 mg) by mouth daily as needed (erectile dysfunction), Disp: 30 tablet, Rfl: 5     vitamin D3 (CHOLECALCIFEROL) 50 mcg (2000 units) tablet, Take 1 tablet by mouth daily., Disp: , Rfl:     ALLERGIES  No Known Allergies    PAST MEDICAL HISTORY  Past Medical History:   Diagnosis Date     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      Closed fracture of unspecified phalanx or phalanges of hand      Erectile dysfunction, unspecified erectile dysfunction type 12/29/2017     Hyperlipidemia      Nasal bones, closed fracture      KAMRAN on CPAP      Other affections of shoulder region, not elsewhere classified      Other convulsions        PAST SURGICAL HISTORY  Past Surgical History:   Procedure Laterality Date     [facility] NONSPECIFIC PROCEDURE  1960    L Inquinal hernia     [facility] NONSPECIFIC PROCEDURE  0190    Repair Nasal fracture     [facility] NONSPECIFIC PROCEDURE  401556    temporal lobectomy - excision  av malformation     [facility] NONSPECIFIC PROCEDURE      Dental Extraction Age 13       SOCIAL HISTORY  Social History     Socioeconomic History     Marital status: Single     Spouse name: Not on file     Number of children: Not on file     Years of education: Not on file     Highest education level: Not on file   Occupational History     Not on  file   Tobacco Use     Smoking status: Former     Current packs/day: 0.00     Types: Cigarettes     Quit date: 1978     Years since quittin.1     Smokeless tobacco: Never   Substance and Sexual Activity     Alcohol use: Yes     Comment:  1-2 beers  per week     Drug use: Never     Sexual activity: Yes     Partners: Female   Other Topics Concern     Parent/sibling w/ CABG, MI or angioplasty before 65F 55M? Not Asked   Social History Narrative     Not on file     Social Drivers of Health     Financial Resource Strain: Low Risk  (2023)    Financial Resource Strain      Within the past 12 months, have you or your family members you live with been unable to get utilities (heat, electricity) when it was really needed?: No   Food Insecurity: Low Risk  (2023)    Food Insecurity      Within the past 12 months, did you worry that your food would run out before you got money to buy more?: No      Within the past 12 months, did the food you bought just not last and you didn t have money to get more?: No   Transportation Needs: Low Risk  (2023)    Transportation Needs      Within the past 12 months, has lack of transportation kept you from medical appointments, getting your medicines, non-medical meetings or appointments, work, or from getting things that you need?: No   Physical Activity: Not on file   Stress: Not on file   Social Connections: Unknown (10/6/2022)    [facility]    Social Connections      Frequency of Communication with Friends and Family: Not on file   Interpersonal Safety: Low Risk  (2023)    Interpersonal Safety      Do you feel physically and emotionally safe where you currently live?: Yes      Within the past 12 months, have you been hit, slapped, kicked or otherwise physically hurt by someone?: No      Within the past 12 months, have you been humiliated or emotionally abused in other  "ways by your partner or ex-partner?: No   Housing Stability: Low Risk  (11/6/2023)    Housing Stability      Do you have housing? : Yes      Are you worried about losing your housing?: No       FAMILY HISTORY  Family History   Adopted: Yes       REVIEW OF SYSTEMS  Complete 12 system Review of Systems performed and was negative except for HPI.    VITALS  There were no vitals filed for this visit.    PHYSICAL EXAMINATION   General: Age appropriate appearing, no acute distress  HEENT: normocephalic, atraumatic, sclera non-icteric  Skin: No open skin lesion noted in visible areas.  Respiratory: Non labored breathing, No wheezes.  Cardiac/Vessels: No edema, cyanosis, clubbing noted in all extremities.  Lymph: No palpable lymph node swelling noted around the affected area.  Mental: There was no signs of aberrant behaviors noted. Patient was pleasant throughout the encounter.    Functional Movements: Non-antalgic gait appreciated. Able to complete squat, balance on each leg, walk on heels and walk on toes without difficulty.     RIGHT KNEE:   Inspection: No deformities, atrophy, effusion, warmth   Palpation: TTP medial joint line and lateral patellar facet  Range of Motion (Estimated, Active unless otherwise noted):Flexion 150 degrees, extension to neutral.   Reflexes: trace patellar and achilles reflexes  Sensation:  numbness to light touch in bilateral dorsum of feet, medial and lateral malleoli and right medial knee.  Intact to light touch bilaterally at the bilateral anterior thighs, and left medial knee  Special Testing:   Testing: The following special tests were negative: Valgus stress test, Varus stress test, Lachman's test, Anterior drawer test, Posterior drawer test, Marielos's test, Thessaly test,  Patellofemoral grind test      IMAGING STUDIES:  10/18/2024 right knee radiographs: \"Chondrocalcinosis over the medial and lateral compartments. Joint spaces are otherwise preserved and normally aligned. No acute " "fracture or joint effusion.\"    I personally reviewed these images and agree with the radiology report and shared the findings with the patient.    IMPRESSION  Leighton Morales is a very pleasant 68 year old male with lewy body dementia and HLD who is presenting today with right knee that seems related to right knee pseudogout.  He likely has a mild osteoarthritic component to his pain given his age and the chronic nature.  It is reassuring that he has no fracture, avulsion or other osseus abnormalities.    PLAN  The following was discussed with the patient:  Activity Modification: Activity at tolerated  Imaging/Tests: Prior right knee radiographs reviewed as noted above  Rehabilitation: Physical therapy recommended, referral placed  Orthotics/Bracing: None recommended at this time  Medication:  May use over the counter analgesics such as Ibuprofen and Extra strength Tylenol as needed  Interventions: Right knee ultrasound guided corticosteroid injection performed at today's visit.  See separate procedure note below for further details.  Follow-up Plan: 6-8 weeks  Resources Provided: Written and verbal information detailing above findings and plan provided including after visit summary.    SP[facility]TS MEDICINE CLINIC PROCEDURE     Informed Consent:   Following denial of allergy and review of potential side effects and complications including but not necessarily limited to infection, allergic reaction, local tissue breakdown, systemic effects of corticosteroids, elevation of blood glucose, injury to soft tissue and/or nerves and seizure, the patient indicated understanding and agreed to proceed. Written consent was obtained.    Procedure: Large Joint Injection/Arthocentesis: R knee joint    Date/Time: 11/4/2024 6:18 PM    Performed by: Elma Song MD  Authorized by: Elma Song MD    Indications:  Pain and osteoarthritis  Needle Size:  25 G  Guidance: ultrasound    Approach:  Anterolateral  Location:  " Knee      Medications:  40 mg triamcinolone 40 MG/ML; 3 mL lidocaine 1 %  Medications comment:  4 ml of 0.9% Sodium Chloride Solution was used for compounding the steroid injectate    Outcome:  Tolerated well, no immediate complications  Procedure discussed: discussed risks, benefits, and alternatives    Consent Given by:  Patient  Timeout: timeout called immediately prior to procedure    Prep: patient was prepped and draped in usual sterile fashion     Ultrasound was used to ensure safe and accurate needle placement and injection. Ultrasound images of the procedure were permanently stored.        Procedural Details  Transducer:  4-12 mHz Linear   Patient position: Supine with the knee bent to 30 degrees.  Localization process: The suprapatellar recess was localized in a short axis view.  Local anesthesia: Local anesthesia was obtained with 3 cc of 1% lidocaine.  Needle: A 25-gauge, 1.5-inch needle was used for the local anesthesia and injectate.  Approach: A lateral to medial, in plane, approach was used to guide the needle tip into the suprapatellar recess deep to the quadriceps tendon and superficial to the prefemoral fat pad.   Injection: A mixture of 4 cc of normal saline and 1 cc of triamcinolone 40mg/ml was injected into the right knee joint without complication.    Post-procedural care: The patient tolerated the procedure well. He reported excellent pain relief during the anesthetic phase. The patient was asked to ice for improved pain control and avoid submerging the area in water for the next 48 hours to help reduce the risk of infection. The patient was instructed to call the office immediately if there are any questions or concerns. He will plan to follow up in 6-8 weeks.    They were encouraged to message me on [employer] whenever they needed.    The patient was in agreement with this plan. All questions were answered to the best of my ability.    Total time (face-to-face and  non-face-to-face) spent on today's visit was 70 minutes. This included preparation for the visit (i.e. reviewing test results), performance of a medically  appropriate history and examination, placing orders for medications, tests or other procedures, and discussing the plan of care with the patient. This time is exclusive of procedures performed and time spent teaching.     Elma Song MD, [facility]  Sports Medicine Attending Physician  Department of Physical Medicine & Rehabilitation         Again, thank you for allowing me to participate in the care of your patient.        Sincerely,        Elma Song MD

## 2024-11-04 NOTE — PATIENT INSTRUCTIONS
Leighton Morales, It was nice to see you in our office today.      DIAGNOSIS:   1. Pseudogout of right knee    2. Acute pain of right knee      PROCEDURE: Today you had an ultrasound-guided right knee corticosteroid injection    POST-INJECTION INSTRUCTIONS:  No increased activity the day of the injection, but it is okay to perform typical daily activities. Gradually increase your activities after 24 hours as tolerated.  Do not submerge the area in water for 48 hours, but you can take a shower.    Ice the area if you are sore by using ice packs for 10-15 minutes, 3 to 4 times a day for comfort if needed   You can take your normal over the counter medications to help with the pain if needed.    If you develop signs or symptoms of an infection (e.g., redness, swelling, increased pain, drainage, fever), suspect you may be having a reaction to the medication, or have any questions call [facility] Sports Medicine at [phone]  during regular business hours.  If you are experiencing serious symptoms, call 911 or go to the emergency room.     INSTRUCTIONS FOR FOLLOW-UP CARE:  Activity Status Recommended:Activity as tolerated, being guided by pain using the following  Traffic Light System :    Green Light (0-3/10 pain): No increases in symptoms, you are OK to continue the activity, or perhaps increase load slightly.  Yellow light (4-6/10 pain): Minor increase in symptoms, but you can move normally within an hour of exercise, and pain reduces to normal within the next 24 hours. Proceed with caution, you can do minor bouts of loading, but too much will aggravate your symptoms and increase pain.  Red light (7-10/10 pain): Cease activity, as you have exceeded your tolerance. Generally, involves a significant increase in pain, which may not settle in the following 24 hours. Rest for 24-48 hours, allow symptoms to settle down, then begin gentle movement, and monitor your progression.  Imaging ordered: Prior right knee  radiographs reviewed at today's visit  Rehabilitation: Physical therapy recommended, referral placed  Bracing/Orthotics: None recommended at this time  US guided injections: Right knee ultrasound guided corticosteroid injection performed at today's visit  Medications: May use over the counter analgesics such as Ibuprofen and Extra strength Tylenol as needed  Follow up: 6-8 weeks      CLINIC LOCATIONS:     [place] [address], [address] TRIAGE LINE: [phone]   [place] [address] APPOINTMENTS: [phone]   (Monday & Friday) RADIOLOGY: [phone]    MRI/CT [phone]   [place] [facility]: [phone] [address] BILLING QUESTIONS: [phone]   [place] [address] FAX: [phone]   (Tuesday & Wednesday)        Thank you for choosing [facility]!    If you have any questions, please do not hesitate to reach out on [employer] or Call [phone] and ask for my team.    Elma Song MD, [facility]

## 2024-11-11 ENCOUNTER — MYC MEDICAL ADVICE (OUTPATIENT)
Dept: NEUROLOGY | Facility: CLINIC | Age: 69
End: 2024-11-11

## 2024-11-11 NOTE — LETTER
11/18/2024       RE: Leighton Morales  [address]   [place] [address]      To Whom It May Concern:     Leighton Morales was seen by myself in clinic on 11/01/2024 as a new patient. During this visit I recommended that Leighton pursue the following medication changes:    - Decrease his Donepezil dosing by reducing down to 5 mg daily x 2 weeks, then stopping. If cognition deteriorates, go back on donepezil     - Melatonin 20 mg nightly     Please feel free to reach out to the clinic with any questions.        Sincerely,     Greyson Mabry MD

## 2025-02-18 ENCOUNTER — TELEPHONE (OUTPATIENT)
Dept: NEUROLOGY | Facility: CLINIC | Age: 70
End: 2025-02-18

## 2025-02-18 ENCOUNTER — TELEPHONE (OUTPATIENT)
Dept: INTERNAL MEDICINE | Facility: CLINIC | Age: 70
End: 2025-02-18
Payer: COMMERCIAL

## 2025-02-18 NOTE — TELEPHONE ENCOUNTER
Dr. Mabry, We received a request from the [facility] for a referral to OT/ST and Psychiatry.  See the request below.  I have put the paperwork in your bin here at the clinic.

## 2025-02-18 NOTE — TELEPHONE ENCOUNTER
OK for Physical therapy referral for  eval and treat if assisted living requesting.   Pt has known Lewy Body dementia managed by neurology so doesn't need cognitive assessment or psychiatry referral from my standpoint. If pt having worsening cognitive issues or new mental health depression/anxiety with his dementia, then assisted living to speak with pt's neurologist

## 2025-02-19 NOTE — TELEPHONE ENCOUNTER
Called and spoke with Simin. Relayed providers message from below. [facility] is asking that the PT order be faxed to them at [phone].     Routing to team to fax order.

## 2025-02-20 NOTE — TELEPHONE ENCOUNTER
Form signed by provider, faxed to specified recipient, and copy sent to scanning.   Jarrell Givens LPN

## 2025-02-24 ENCOUNTER — MEDICAL CORRESPONDENCE (OUTPATIENT)
Dept: HEALTH INFORMATION MANAGEMENT | Facility: CLINIC | Age: 70
End: 2025-02-24

## 2025-04-12 ENCOUNTER — HEALTH MAINTENANCE LETTER (OUTPATIENT)
Age: 70
End: 2025-04-12

## 2025-04-14 ENCOUNTER — MEDICAL CORRESPONDENCE (OUTPATIENT)
Dept: HEALTH INFORMATION MANAGEMENT | Facility: CLINIC | Age: 70
End: 2025-04-14
Payer: COMMERCIAL

## 2025-04-28 ENCOUNTER — TELEPHONE (OUTPATIENT)
Dept: NEUROLOGY | Facility: CLINIC | Age: 70
End: 2025-04-28

## 2025-04-28 DIAGNOSIS — G40.909 NONINTRACTABLE EPILEPSY WITHOUT STATUS EPILEPTICUS, UNSPECIFIED EPILEPSY TYPE (H): Primary | ICD-10-CM

## 2025-04-28 NOTE — TELEPHONE ENCOUNTER
Patient is requesting a referral to be sent out to [facility] to continue care with Dr. Patton at different clinic     Send referral for epilepsy seizure with Dr. Patton name mentioned for reasoning fax over [facility] [phone].

## 2025-04-30 NOTE — TELEPHONE ENCOUNTER
RN placed referral for patient to Carmen Patton MD at [facility].   Referral faxed to [phone].  [employer] message sent to patient informing of referral placed.

## 2025-05-01 ENCOUNTER — PATIENT OUTREACH (OUTPATIENT)
Dept: CARE COORDINATION | Facility: CLINIC | Age: 70
End: 2025-05-01
Payer: COMMERCIAL

## 2025-05-05 ENCOUNTER — PATIENT OUTREACH (OUTPATIENT)
Dept: CARE COORDINATION | Facility: CLINIC | Age: 70
End: 2025-05-05
Payer: COMMERCIAL

## 2025-05-19 ENCOUNTER — TELEPHONE (OUTPATIENT)
Dept: NEUROLOGY | Facility: CLINIC | Age: 70
End: 2025-05-19

## 2025-05-19 NOTE — TELEPHONE ENCOUNTER
Received Physician Order Sheet/Request for PT Order Form to be completed. Form saved to R drive, encounter routed.  Jennifer Reinoso, JORJE

## 2025-07-23 ENCOUNTER — PATIENT OUTREACH (OUTPATIENT)
Dept: CARE COORDINATION | Facility: CLINIC | Age: 70
End: 2025-07-23
Payer: COMMERCIAL

## 2025-08-07 ENCOUNTER — OFFICE VISIT (OUTPATIENT)
Dept: NEUROLOGY | Facility: CLINIC | Age: 70
End: 2025-08-07
Payer: COMMERCIAL

## 2025-08-07 VITALS
HEIGHT: 71 IN | BODY MASS INDEX: 24.78 KG/M2 | SYSTOLIC BLOOD PRESSURE: 120 MMHG | HEART RATE: 72 BPM | TEMPERATURE: 98 F | WEIGHT: 177 LBS | DIASTOLIC BLOOD PRESSURE: 76 MMHG

## 2025-08-07 DIAGNOSIS — R53.83 FATIGUE, UNSPECIFIED TYPE: ICD-10-CM

## 2025-08-07 DIAGNOSIS — G47.52 DREAM ENACTMENT BEHAVIOR: ICD-10-CM

## 2025-08-07 DIAGNOSIS — G40.909 SEIZURE SYNDROME (H): Primary | ICD-10-CM

## 2025-08-07 DIAGNOSIS — G40.119 PARTIAL EPILEPSY WITH INTRACTABLE EPILEPSY (H): ICD-10-CM

## 2025-08-07 DIAGNOSIS — G40.909 SEIZURE DISORDER (H): ICD-10-CM

## 2025-08-07 LAB
ERYTHROCYTE [DISTWIDTH] IN BLOOD BY AUTOMATED COUNT: 13.2 % (ref 10–15)
HCT VFR BLD AUTO: 43.5 % (ref 40–53)
HGB BLD-MCNC: 14.4 G/DL (ref 13.3–17.7)
MCH RBC QN AUTO: 31 PG (ref 26.5–33)
MCHC RBC AUTO-ENTMCNC: 33.1 G/DL (ref 31.5–36.5)
MCV RBC AUTO: 94 FL (ref 78–100)
PLATELET # BLD AUTO: 149 10E3/UL (ref 150–450)
RBC # BLD AUTO: 4.64 10E6/UL (ref 4.4–5.9)
WBC # BLD AUTO: 5.3 10E3/UL (ref 4–11)

## 2025-08-07 RX ORDER — CARBAMAZEPINE 400 MG/1
400 TABLET, EXTENDED RELEASE ORAL 2 TIMES DAILY
Qty: 180 TABLET | Refills: 3 | Status: SHIPPED | OUTPATIENT
Start: 2025-08-07

## 2025-08-07 RX ORDER — LEVETIRACETAM 1000 MG/1
TABLET ORAL
Qty: 270 TABLET | Refills: 3 | Status: SHIPPED | OUTPATIENT
Start: 2025-08-07

## 2025-08-13 LAB
CARBAMAZEPINE EP SERPL-MCNC: 2.3 UG/ML
CARBAMAZEPINE SERPL-MCNC: 8.8 UG/ML